# Patient Record
Sex: FEMALE | Race: BLACK OR AFRICAN AMERICAN | NOT HISPANIC OR LATINO | Employment: UNEMPLOYED | URBAN - METROPOLITAN AREA
[De-identification: names, ages, dates, MRNs, and addresses within clinical notes are randomized per-mention and may not be internally consistent; named-entity substitution may affect disease eponyms.]

---

## 2017-02-25 ENCOUNTER — HOSPITAL ENCOUNTER (EMERGENCY)
Facility: OTHER | Age: 42
Discharge: HOME | End: 2017-02-25
Attending: EMERGENCY MEDICINE
Payer: MEDICAID

## 2017-02-25 VITALS
HEIGHT: 64 IN | RESPIRATION RATE: 18 BRPM | TEMPERATURE: 98 F | OXYGEN SATURATION: 98 % | BODY MASS INDEX: 50.02 KG/M2 | WEIGHT: 293 LBS | SYSTOLIC BLOOD PRESSURE: 120 MMHG | DIASTOLIC BLOOD PRESSURE: 90 MMHG | HEART RATE: 73 BPM

## 2017-02-25 DIAGNOSIS — R10.9 ABDOMINAL PAIN: ICD-10-CM

## 2017-02-25 DIAGNOSIS — R10.84 ABDOMINAL PAIN, GENERALIZED: Primary | ICD-10-CM

## 2017-02-25 LAB
BILIRUB SERPL-MCNC: NEGATIVE MG/DL
BLOOD, POC UA: NEGATIVE
CLARITY, POC UA: CLEAR
COLOR, POC UA: YELLOW
GLUCOSE SERPL-MCNC: NEGATIVE MG/DL (ref 70–110)
LEUKOCYTE EST, POC UA: NEGATIVE
NITRITE, POC UA: NEGATIVE
PH SMN: 6.5 [PH]
POC KETONES, BLOOD: NEGATIVE
PROTEIN, POC: NEGATIVE
SPECIFIC GRAVITY, POC UA: 1.02
UROBILINOGEN, POC UA: 0.2 E.U./DL

## 2017-02-25 PROCEDURE — 81003 URINALYSIS AUTO W/O SCOPE: CPT

## 2017-02-25 PROCEDURE — 81025 URINE PREGNANCY TEST: CPT

## 2017-02-25 PROCEDURE — 96372 THER/PROPH/DIAG INJ SC/IM: CPT

## 2017-02-25 PROCEDURE — 99284 EMERGENCY DEPT VISIT MOD MDM: CPT | Mod: 25

## 2017-02-25 PROCEDURE — 63600175 PHARM REV CODE 636 W HCPCS: Performed by: EMERGENCY MEDICINE

## 2017-02-25 RX ORDER — DICYCLOMINE HYDROCHLORIDE 10 MG/ML
20 INJECTION INTRAMUSCULAR
Status: COMPLETED | OUTPATIENT
Start: 2017-02-25 | End: 2017-02-25

## 2017-02-25 RX ORDER — DICYCLOMINE HYDROCHLORIDE 20 MG/1
20 TABLET ORAL 2 TIMES DAILY
Qty: 20 TABLET | Refills: 0 | Status: SHIPPED | OUTPATIENT
Start: 2017-02-25 | End: 2017-03-27

## 2017-02-25 RX ADMIN — DICYCLOMINE HYDROCHLORIDE 20 MG: 20 INJECTION, SOLUTION INTRAMUSCULAR at 04:02

## 2017-02-25 NOTE — ED NOTES
Abdominal Pain: Patient complains of abdominal pain. The pain is described as colicky, cramping and stabbing, and is 8/10 in intensity. Pain is located in the generalized radiating down to the pelvic area with radiation to left groin and right groin. Onset was 2 days ago. Symptoms have been gradually worsening since. Aggravating factors: bowel movement and eating.  Alleviating factors: sitting up. Associated symptoms: constipation and nausea. The patient denies belching, diarrhea and fever.

## 2017-02-25 NOTE — ED AVS SNAPSHOT
[facility] EMERGENCY DEPARTMENT  [address] [place] [address]               Ashanti Thompson   2017  3:01 PM   ED    Description:  Female : 1975   Department:  [facility] Emergency Department           Your Care was Coordinated By:     Provider Role From To    Gemini Rincon MD Attending Provider [phone] --      Reason for Visit     Abdominal Pain           Diagnoses this Visit        Comments    Abdominal pain, generalized    -  Primary     Abdominal pain           ED Disposition     ED Disposition Condition Comment    Discharge             To Do List           Follow-up Information     Follow up with Galdino Angulo MD In 4 day(s).    Specialty:  Family Medicine    Contact information:    [address] [facility]  [place] [address]  [phone]         These Medications        Disp Refills Start End    dicyclomine (BENTYL) 20 mg tablet 20 tablet 0 2017 3/27/2017    Take 1 tablet (20 mg total) by mouth 2 (two) times daily. - Oral    [facility]arm[facility]: [facility] [address] [place] [address] EXPY AT [facility] Ph #: [phone]         [facility] On Call     [facility] On Call Nurse Care Line -  Assistance  Registered nurses in the [facility] On Call Center provide clinical advisement, health education, appointment booking, and other advisory services.  Call for this free service at [phone].             Medications           Message regarding Medications     Verify the changes and/or additions to your medication regime listed below are the same as discussed with your clinician today.  If any of these changes or additions are incorrect, please notify your healthcare provider.        START taking these NEW medications        Refills    dicyclomine (BENTYL) 20 mg tablet 0    Sig: Take 1 tablet (20 mg total) by mouth 2 (two) times daily.    Class: Normal    Route: Oral      These medications were administered today        Dose Freq     dicyclomine injection 20 mg 20 mg ED 1 Time    Sig: Inject 2 mLs (20 mg total) into the muscle ED 1 Time.    Class: Normal    Route: Intramuscular           Verify that the below list of medications is an accurate representation of the medications you are currently taking.  If none reported, the list may be blank. If incorrect, please contact your healthcare provider. Carry this list with you in case of emergency.           Current Medications     ACCU-CHEK BRIDGER PLUS METER [facility] [place]     blood-glucose meter (FREESTYLE SYSTEM KIT) kit Of patients choice, kervin brand    BOOSTRIX TDAP 2.5-8-5 Lf-mcg-Lf/0.5mL Susp     dicyclomine (BENTYL) 20 mg tablet Take 1 tablet (20 mg total) by mouth 2 (two) times daily.    FLUVIRIN [id number] 45 mcg (15 mcg x 3)/0.5 mL Susp     glyBURIDE (DIABETA) 5 MG tablet     ketoconazole (NIZORAL) 2 % cream Apply topically once daily.    LUTERA, 28, 0.1-20 mg-mcg per tablet     meloxicam (MOBIC) 15 MG tablet Po daily    metformin (GLUCOPHAGE) 500 MG tablet Take 1 tablet (500 mg total) by mouth daily with breakfast.    metformin (GLUCOPHAGE) 500 MG tablet Take 1 tablet (500 mg total) by mouth once daily.    pravastatin (PRAVACHOL) 40 MG tablet     TRUETEST TEST STRIPS Strp            Clinical Reference Information           Your Vitals Were     BP                   118/80 (BP Location: Left arm, Patient Position: Sitting)           Allergies as of 2/25/2017     No Known Allergies      Immunizations Administered on Date of Encounter - 2/25/2017     None      ED Micro, Lab, POCT     Start Ordered       Status Ordering Provider    02/25/17 1609 02/25/17 1609  POCT URINALYSIS W/O SCOPE  Once      Final result     02/25/17 1608 02/25/17 1608  POCT urine pregnancy  Once      Acknowledged     02/25/17 1608 02/25/17 1608  POCT URINALYSIS W/O SCOPE  Once      Completed       ED Imaging Orders     Start Ordered       Status Ordering Provider    02/25/17 1642 02/25/17 1642  X-Ray Abdomen Flat And Erect  1  time imaging      Final result         Discharge Instructions       Bentyl 20 mg one by mouth up to 4 times a day as needed for crampy abdominal pain.  Increase liquids by mouth for several days as well as fiber.  Consider adding a fiber supplement such as Metamucil or FiberCon to help soften stools.  Follow up with your primary care provider, Dr. Galdino Angulo, and for 5 days if not improving.    Abdominal Pain    Abdominal pain is pain in the stomach or belly area. Everyone has this pain from time to time. In many cases it goes away on its own. But abdominal pain can sometimes be due to a serious problem, such as appendicitis. So its important to know when to seek help.  Causes of abdominal pain  There are many possible causes of abdominal pain. Common causes in adults include:  · Constipation, diarrhea, or gas  · Stomach acid flowing back up into the esophagus (acid reflux or heartburn)  · Severe acid reflux, called GERD (gastroesophageal reflux disease)  · A sore in the lining of the stomach or small intestine (peptic ulcer)  · Inflammation of the gallbladder, liver, or pancreas  · Gallstones or kidney stones  · Appendicitis   · Intestinal blockage   · An internal organ pushing through a muscle or other tissue (hernia)  · Urinary tract infections  · In women, menstrual cramps, fibroids, or endometriosis  · Inflammation or infection of the intestines  Diagnosing the cause of abdominal pain  Your healthcare provider will do a physical exam help find the cause of your pain. If needed, tests will be ordered. Belly pain has many possible causes. So it can be hard to find the reason for your pain. Giving details about your pain can help. Tell your provider where and when you feel the pain, and what makes it better or worse. Also let your provider know if you have other symptoms such as:  · Fever  · Tiredness  · Upset stomach (nausea)  · Vomiting  · Changes in bathroom habits  Treating abdominal pain  Some causes of  pain need emergency medical treatment right away. These include appendicitis or a bowel blockage. Other problems can be treated with rest, fluids, or medicines. Your healthcare provider can give you specific instructions for treatment or self-care based on what is causing your pain.  If you have vomiting or diarrhea, sip water or other clear fluids. When you are ready to eat solid foods again, start with small amounts of easy-to-digest, low-fat foods. These include apple sauce, toast, or crackers.   When to seek medical care  Call 911 or go to the hospital right away if you:  · Cant pass stool and are vomiting  · Are vomiting blood or have bloody diarrhea or black, tarry diarrhea  · Have chest, neck, or shoulder pain  · Feel like you might pass out  · Have pain in your shoulder blades with nausea  · Have sudden, severe belly pain  · Have new, severe pain unlike any you have felt before  · Have a belly that is rigid, hard, and tender to touch  Call your healthcare provider if you have:  · Pain for more than 5 days  · Bloating for more than 2 days  · Diarrhea for more than 5 days  · A fever of 100.4°F (38.0°C) or higher, or as directed by your provider  · Pain that gets worse  · Weight loss for no reason  · Continued lack of appetite  · Blood in your stool  How to prevent abdominal pain  Here are some tips to help prevent abdominal pain:  · Eat smaller amounts of food at one time.  · Avoid greasy, fried, or other high-fat foods.  · Avoid foods that give you gas.  · Exercise regularly.  · Drink plenty of fluids.  To help prevent GERD symptoms:  · Quit smoking.  · Reduce alcohol and certain foods that increase stomach acid.  · Avoid aspirin and over-the-counter pain and fever medicines (NSAIDS or nonsteroidal anti-inflammatory drugs), if possible  · Lose extra weight.  · Finish eating at least 2 hours before you go to bed or lie down.  · Raise the head of your bed.  Date Last Reviewed: 7/1/2016  © 5590-6489 [employer]. [address] [place] [address]. All rights reserved. This information is not intended as a substitute for professional medical care. Always follow your healthcare professional's instructions.          [facility] Sign-Up     Activating your [facility] account is as easy as 1-2-3!     1) Visit [email or web address], select Sign Up Now, enter this activation code and your date of birth, then select Next.  [email or web address]  Expires: 4/11/2017  6:05 PM      2) Create a username and password to use when you visit [facility] in the future and select a security question in case you lose your password and select Next.    3) Enter your e-mail address and click Sign Up!    Additional Information  If you have questions, please e-mail [email or web address] or call [phone] to talk to our [facility] staff. Remember, [facility] is NOT to be used for urgent needs. For medical emergencies, dial 911.          [facility] Emergency Department complies with applicable Federal civil rights laws and does not discriminate on the basis of race, color, national origin, age, disability, or sex.        Language Assistance Services     ATTENTION: Language assistance services are available, free of charge. Please call [phone].      ATENCIÓN: Si habla español, tiene a agosto disposición servicios gratuitos de asistencia lingüística. Llame al [phone].     CHÚ Ý: N?u b?n nói Ti?ng Vi?t, có các d?ch v? h? tr? ngôn ng? mi?n phí dành cho b?n. G?i s? [phone].

## 2017-02-26 NOTE — DISCHARGE INSTRUCTIONS
Bentyl 20 mg one by mouth up to 4 times a day as needed for crampy abdominal pain.  Increase liquids by mouth for several days as well as fiber.  Consider adding a fiber supplement such as Metamucil or FiberCon to help soften stools.  Follow up with your primary care provider, Dr. Galdino Angulo, and for 5 days if not improving.    Abdominal Pain    Abdominal pain is pain in the stomach or belly area. Everyone has this pain from time to time. In many cases it goes away on its own. But abdominal pain can sometimes be due to a serious problem, such as appendicitis. So its important to know when to seek help.  Causes of abdominal pain  There are many possible causes of abdominal pain. Common causes in adults include:  · Constipation, diarrhea, or gas  · Stomach acid flowing back up into the esophagus (acid reflux or heartburn)  · Severe acid reflux, called GERD (gastroesophageal reflux disease)  · A sore in the lining of the stomach or small intestine (peptic ulcer)  · Inflammation of the gallbladder, liver, or pancreas  · Gallstones or kidney stones  · Appendicitis   · Intestinal blockage   · An internal organ pushing through a muscle or other tissue (hernia)  · Urinary tract infections  · In women, menstrual cramps, fibroids, or endometriosis  · Inflammation or infection of the intestines  Diagnosing the cause of abdominal pain  Your healthcare provider will do a physical exam help find the cause of your pain. If needed, tests will be ordered. Belly pain has many possible causes. So it can be hard to find the reason for your pain. Giving details about your pain can help. Tell your provider where and when you feel the pain, and what makes it better or worse. Also let your provider know if you have other symptoms such as:  · Fever  · Tiredness  · Upset stomach (nausea)  · Vomiting  · Changes in bathroom habits  Treating abdominal pain  Some causes of pain need emergency medical treatment right away. These include  appendicitis or a bowel blockage. Other problems can be treated with rest, fluids, or medicines. Your healthcare provider can give you specific instructions for treatment or self-care based on what is causing your pain.  If you have vomiting or diarrhea, sip water or other clear fluids. When you are ready to eat solid foods again, start with small amounts of easy-to-digest, low-fat foods. These include apple sauce, toast, or crackers.   When to seek medical care  Call 911 or go to the hospital right away if you:  · Cant pass stool and are vomiting  · Are vomiting blood or have bloody diarrhea or black, tarry diarrhea  · Have chest, neck, or shoulder pain  · Feel like you might pass out  · Have pain in your shoulder blades with nausea  · Have sudden, severe belly pain  · Have new, severe pain unlike any you have felt before  · Have a belly that is rigid, hard, and tender to touch  Call your healthcare provider if you have:  · Pain for more than 5 days  · Bloating for more than 2 days  · Diarrhea for more than 5 days  · A fever of 100.4°F (38.0°C) or higher, or as directed by your provider  · Pain that gets worse  · Weight loss for no reason  · Continued lack of appetite  · Blood in your stool  How to prevent abdominal pain  Here are some tips to help prevent abdominal pain:  · Eat smaller amounts of food at one time.  · Avoid greasy, fried, or other high-fat foods.  · Avoid foods that give you gas.  · Exercise regularly.  · Drink plenty of fluids.  To help prevent GERD symptoms:  · Quit smoking.  · Reduce alcohol and certain foods that increase stomach acid.  · Avoid aspirin and over-the-counter pain and fever medicines (NSAIDS or nonsteroidal anti-inflammatory drugs), if possible  · Lose extra weight.  · Finish eating at least 2 hours before you go to bed or lie down.  · Raise the head of your bed.  Date Last Reviewed: 7/1/2016  © 3453-2771 [employer]. [address] [place] [address], [place] [address]. All  rights reserved. This information is not intended as a substitute for professional medical care. Always follow your healthcare professional's instructions.

## 2017-02-26 NOTE — ED PROVIDER NOTES
Encounter Date: 2017       History     Chief Complaint   Patient presents with    Abdominal Pain     c/o of lower abdominal pain readiating down to her pelvic area since last night. Patient reports she had a bowel movement with no relief. Stool was hard then soft. took laxative still no relief. no nausea or vomiting     Review of patient's allergies indicates:  No Known Allergies  HPI Comments: 41-year-old female reports right sided lower abdominal and suprapubic pain radiating into her pelvis since last night.  She reports it improved last night after having a bowel movement which was small and hard.  She reports took a laxative today without much relief.  Patient also reports his crampy in nature.  Denies fever, nausea, and vomiting.    Patient is a 41 y.o. female presenting with the following complaint: abdominal pain. The history is provided by the patient.   Abdominal Pain   The current episode started yesterday. The onset of the illness was gradual. The abdominal pain is located in the RUQ, RLQ and suprapubic region. The severity of the abdominal pain is 7/10. The abdominal pain is relieved by nothing. The other symptoms of the illness do not include fever, shortness of breath, nausea or vomiting.   The patient states that she believes she is currently not pregnant. The patient has not had a change in bowel habit. Additional symptoms associated with the illness include constipation and back pain. Symptoms associated with the illness do not include chills or frequency.     Past Medical History:   Diagnosis Date    Diabetes mellitus     Hyperlipemia     Iron deficiency      Past Surgical History:   Procedure Laterality Date     SECTION       Family History   Problem Relation Age of Onset    Diabetes Father      Social History   Substance Use Topics    Smoking status: Never Smoker    Smokeless tobacco: None    Alcohol use No     Review of Systems   Constitutional: Negative for chills and  fever.   Respiratory: Negative for cough and shortness of breath.    Gastrointestinal: Positive for abdominal pain and constipation. Negative for blood in stool, nausea and vomiting.   Genitourinary: Negative for difficulty urinating and frequency.   Musculoskeletal: Positive for back pain. Negative for gait problem.       Physical Exam   Initial Vitals   BP Pulse Resp Temp SpO2   02/25/17 1506 02/25/17 1506 02/25/17 1506 02/25/17 1506 02/25/17 1506   118/80 81 20 98.1 °F (36.7 °C) 100 %     Physical Exam    Nursing note and vitals reviewed.  Constitutional: Vital signs are normal. She appears well-developed and well-nourished. She is cooperative.   HENT:   Head: Normocephalic and atraumatic.   Neck: Phonation normal. Neck supple.   Cardiovascular: Normal rate, regular rhythm and normal heart sounds.   Pulmonary/Chest: Effort normal and breath sounds normal.   Abdominal: Soft. Bowel sounds are normal. There is tenderness in the right upper quadrant. There is no rigidity, no rebound, no guarding, no tenderness at McBurney's point and negative Elias's sign.       Genitourinary: Rectal exam shows no tenderness and guaiac negative stool. Guaiac negative stool.   Musculoskeletal:        Cervical back: Normal.        Thoracic back: Normal.        Lumbar back: Normal.   Neurological: She is alert and oriented to person, place, and time. Gait normal.   Skin: Skin is warm and dry.         ED Course   Procedures  Labs Reviewed   POCT URINALYSIS W/O SCOPE   POCT URINALYSIS W/O SCOPE   POCT URINE PREGNANCY             Medical Decision Making:   Initial Assessment:   Crampy lower abdominal pain  Differential Diagnosis:   Constipation, urinary tract infection, menstrual cycle  Clinical Tests:   Lab Tests: Ordered and Reviewed  The following lab test(s) were unremarkable: Urinalysis       <> Summary of Lab: Urinalysis was normal  UPT negative    Flat and erect of the abdomin denies bowel gas pattern; no evidence of  obstruction.  Radiological Study: Ordered and Reviewed  ED Management:  Patient will be discharged with instructions to increase fiber in her diet as well as oral fluids for possible constipation.  Also recommend Bentyl 20 mg by mouth up to 4 times daily, as well as close follow-up with primary care.                   ED Course     Clinical Impression:   The primary encounter diagnosis was Abdominal pain, generalized. A diagnosis of Abdominal pain was also pertinent to this visit.    Disposition:   Disposition: Discharged  Condition: Stable       Gemini Rincon MD  02/25/17 1928

## 2018-02-24 ENCOUNTER — HOSPITAL ENCOUNTER (EMERGENCY)
Facility: OTHER | Age: 43
Discharge: HOME | End: 2018-02-24
Attending: EMERGENCY MEDICINE
Payer: MEDICAID

## 2018-02-24 VITALS
OXYGEN SATURATION: 100 % | BODY MASS INDEX: 50.02 KG/M2 | HEIGHT: 64 IN | HEART RATE: 73 BPM | SYSTOLIC BLOOD PRESSURE: 122 MMHG | TEMPERATURE: 98 F | RESPIRATION RATE: 19 BRPM | WEIGHT: 293 LBS | DIASTOLIC BLOOD PRESSURE: 69 MMHG

## 2018-02-24 DIAGNOSIS — J02.9 SORE THROAT: Primary | ICD-10-CM

## 2018-02-24 DIAGNOSIS — R68.89 FLU-LIKE SYMPTOMS: ICD-10-CM

## 2018-02-24 LAB
B-HCG UR QL: NEGATIVE
BILIRUBIN, POC UA: NEGATIVE
BLOOD, POC UA: NEGATIVE
CLARITY, POC UA: CLEAR
COLOR, POC UA: YELLOW
CTP QC/QA: YES
FLUAV AG NPH QL: NEGATIVE
FLUBV AG NPH QL: NEGATIVE
GLUCOSE SERPL-MCNC: 149 MG/DL (ref 70–110)
GLUCOSE, POC UA: NEGATIVE
KETONES, POC UA: NEGATIVE
LEUKOCYTE EST, POC UA: NEGATIVE
NITRITE, POC UA: NEGATIVE
PH UR STRIP: 5.5 [PH]
PROTEIN, POC UA: NEGATIVE
S PYO RRNA THROAT QL PROBE: NEGATIVE
SPECIFIC GRAVITY, POC UA: 1.02
UROBILINOGEN, POC UA: 0.2 E.U./DL

## 2018-02-24 PROCEDURE — 87804 INFLUENZA ASSAY W/OPTIC: CPT

## 2018-02-24 PROCEDURE — 87070 CULTURE OTHR SPECIMN AEROBIC: CPT

## 2018-02-24 PROCEDURE — 99283 EMERGENCY DEPT VISIT LOW MDM: CPT

## 2018-02-24 PROCEDURE — 81003 URINALYSIS AUTO W/O SCOPE: CPT

## 2018-02-24 PROCEDURE — 25000003 PHARM REV CODE 250: Performed by: EMERGENCY MEDICINE

## 2018-02-24 PROCEDURE — 87880 STREP A ASSAY W/OPTIC: CPT

## 2018-02-24 PROCEDURE — 81025 URINE PREGNANCY TEST: CPT | Performed by: EMERGENCY MEDICINE

## 2018-02-24 RX ORDER — IBUPROFEN 600 MG/1
600 TABLET ORAL
Status: COMPLETED | OUTPATIENT
Start: 2018-02-24 | End: 2018-02-24

## 2018-02-24 RX ORDER — OSELTAMIVIR PHOSPHATE 75 MG/1
75 CAPSULE ORAL 2 TIMES DAILY
Qty: 10 CAPSULE | Refills: 0 | Status: SHIPPED | OUTPATIENT
Start: 2018-02-24 | End: 2018-03-01

## 2018-02-24 RX ORDER — IBUPROFEN 600 MG/1
600 TABLET ORAL EVERY 8 HOURS PRN
Qty: 15 TABLET | Refills: 0 | Status: ON HOLD | OUTPATIENT
Start: 2018-02-24 | End: 2023-11-24 | Stop reason: HOSPADM

## 2018-02-24 RX ADMIN — IBUPROFEN 600 MG: 600 TABLET, FILM COATED ORAL at 08:02

## 2018-02-24 NOTE — ED PROVIDER NOTES
Encounter Date: 2018       History     Chief Complaint   Patient presents with    flu like symptoms     pt reports HA cough bodyaches and chills since last night     Ms. Thompson reports onset of body aches, sore throat, congestion, chills, headache, and mild nonspecific cough.  Reports all onset within the last 12 hours, fairly suddenly.  Reports she has been around the flu lately.  Reports biggest complaint is sore throat.  Reports she still able to perform her normal ADLs.  She is able to swallow, but it hurts.  She denies neck pain, photophobia, focal numbness, tingling or weakness.  She's not been sick recently.  Reports no medications taken prior to arrival.  Symptoms constant      The history is provided by the patient.     Review of patient's allergies indicates:  No Known Allergies  Past Medical History:   Diagnosis Date    Diabetes mellitus     Hyperlipemia     Iron deficiency      Past Surgical History:   Procedure Laterality Date     SECTION       Family History   Problem Relation Age of Onset    Diabetes Father      Social History   Substance Use Topics    Smoking status: Never Smoker    Smokeless tobacco: Not on file    Alcohol use No     Review of Systems   Constitutional: Positive for chills and fever.   HENT: Positive for congestion and sore throat. Negative for trouble swallowing and voice change.    Respiratory: Positive for cough. Negative for shortness of breath, wheezing and stridor.    Cardiovascular: Negative.    Gastrointestinal: Negative.    Musculoskeletal:        Body aches   Neurological: Positive for headaches.   All other systems reviewed and are negative.      Physical Exam     Initial Vitals [18 0721]   BP Pulse Resp Temp SpO2   (!) 168/104 76 19 98.1 °F (36.7 °C) 100 %      MAP       125.33         Physical Exam    Nursing note and vitals reviewed.  Constitutional: She appears well-developed and well-nourished. She is not diaphoretic. No distress.   HENT:    Head: Normocephalic and atraumatic.   Right Ear: External ear normal.   Left Ear: External ear normal.   Mouth/Throat: No oropharyngeal exudate.   Eyes: Conjunctivae and EOM are normal.   Neck: Normal range of motion. Neck supple.   Cardiovascular: Normal rate, regular rhythm and normal heart sounds.   No murmur heard.  Pulmonary/Chest: Breath sounds normal. No respiratory distress.   Abdominal: Soft. She exhibits no distension. There is no tenderness. There is no rebound.   Musculoskeletal: Normal range of motion. She exhibits no edema or tenderness.   Neurological: She is alert and oriented to person, place, and time. No cranial nerve deficit or sensory deficit.   Skin: Skin is warm and dry. Capillary refill takes less than 2 seconds. No rash noted. No erythema.   Psychiatric: She has a normal mood and affect. Her behavior is normal. Thought content normal.         ED Course   Procedures  Labs Reviewed   POCT GLUCOSE - Abnormal; Notable for the following:        Result Value    POC Glucose 149 (*)     All other components within normal limits   POCT URINALYSIS W/O SCOPE - Abnormal; Notable for the following:     Glucose, UA Negative (*)     Bilirubin, UA Negative (*)     Ketones, UA Negative (*)     Blood, UA Negative (*)     Protein, UA Negative (*)     Nitrite, UA Negative (*)     Leukocytes, UA Negative (*)     All other components within normal limits   CULTURE, RESPIRATORY  - THROAT   POCT URINE PREGNANCY   POCT URINALYSIS W/O SCOPE   POCT INFLUENZA A/B   POCT RAPID STREP A             Medical Decision Making:   ED Management:  Triage blood pressure was taken on forearm. Pt reports bp when checked at work on upper arm is 'always normal'. Rechecked on upper arm with correct cuff reveals sbp 120s-140s. She has rested during her time in the ER. We discussed possibility of flu and risks/benefits of tamilu and recommendations and she has politely declined rx. Will provide and she will think about taking.  Wants  to rest and hydrate. Throat culture sent, will f/u.  We discussed judicious care for her diabetes.  We discussed worrisome signs that should prompt need to return to the ER should they occur.  There is no indication for further emergent intervention or evaluation at this time.                      Clinical Impression:   The primary encounter diagnosis was Sore throat. A diagnosis of Flu-like symptoms was also pertinent to this visit.                           Shama Barber MD  03/12/18 1200

## 2018-02-26 LAB — BACTERIA THROAT CULT: NORMAL

## 2019-12-02 ENCOUNTER — HOSPITAL ENCOUNTER (EMERGENCY)
Facility: HOSPITAL | Age: 44
Discharge: HOME | End: 2019-12-02
Attending: EMERGENCY MEDICINE
Payer: MEDICAID

## 2019-12-02 VITALS
WEIGHT: 293 LBS | SYSTOLIC BLOOD PRESSURE: 151 MMHG | RESPIRATION RATE: 18 BRPM | OXYGEN SATURATION: 99 % | HEIGHT: 64 IN | HEART RATE: 89 BPM | BODY MASS INDEX: 50.02 KG/M2 | TEMPERATURE: 99 F | DIASTOLIC BLOOD PRESSURE: 80 MMHG

## 2019-12-02 DIAGNOSIS — B35.9 TINEA: ICD-10-CM

## 2019-12-02 DIAGNOSIS — R39.9 UTI SYMPTOMS: Primary | ICD-10-CM

## 2019-12-02 LAB
B-HCG UR QL: NEGATIVE
BILIRUBIN, POC UA: NEGATIVE
BLOOD, POC UA: NEGATIVE
CLARITY, POC UA: NORMAL
COLOR, POC UA: YELLOW
CTP QC/QA: YES
GLUCOSE, POC UA: NEGATIVE
KETONES, POC UA: NEGATIVE
LEUKOCYTE EST, POC UA: NEGATIVE
NITRITE, POC UA: NEGATIVE
PH UR STRIP: 6 [PH]
PROTEIN, POC UA: NEGATIVE
SPECIFIC GRAVITY, POC UA: 1.02
UROBILINOGEN, POC UA: 0.2 E.U./DL

## 2019-12-02 PROCEDURE — 81025 URINE PREGNANCY TEST: CPT | Mod: ER | Performed by: EMERGENCY MEDICINE

## 2019-12-02 PROCEDURE — 87086 URINE CULTURE/COLONY COUNT: CPT

## 2019-12-02 PROCEDURE — 87491 CHLMYD TRACH DNA AMP PROBE: CPT

## 2019-12-02 PROCEDURE — 81003 URINALYSIS AUTO W/O SCOPE: CPT | Mod: ER

## 2019-12-02 PROCEDURE — 99284 EMERGENCY DEPT VISIT MOD MDM: CPT | Mod: ER

## 2019-12-02 RX ORDER — CEPHALEXIN 500 MG/1
500 CAPSULE ORAL EVERY 12 HOURS
Qty: 20 CAPSULE | Refills: 0 | Status: SHIPPED | OUTPATIENT
Start: 2019-12-02 | End: 2019-12-12

## 2019-12-02 RX ORDER — PHENAZOPYRIDINE HYDROCHLORIDE 200 MG/1
200 TABLET, FILM COATED ORAL
Qty: 12 TABLET | Refills: 0 | Status: SHIPPED | OUTPATIENT
Start: 2019-12-02 | End: 2019-12-06

## 2019-12-02 RX ORDER — CLOTRIMAZOLE 1 %
CREAM (GRAM) TOPICAL
Qty: 15 G | Refills: 0 | Status: SHIPPED | OUTPATIENT
Start: 2019-12-02 | End: 2021-12-23 | Stop reason: SDUPTHER

## 2019-12-03 NOTE — ED PROVIDER NOTES
Encounter Date: 2019    SCRIBE #1 NOTE: I, Jeff Mccollum, am scribing for, and in the presence of,  LINDSEY Woodward. I have scribed the following portions of the note - Other sections scribed: HPI, ROS, PE.       History     Chief Complaint   Patient presents with    Abdominal Pain     PT C/O LOW ABD PAIN AND PRESSURE WHEN URINATING    Dysuria     44-year-old female with history of diabetes presents to the emergency department for 1 day history of atraumatic suprapubic abdominal pain associated with urinary frequency, urgency, and mild dysuria.  States her current symptoms are similar to her past episodes of UTIs, but more severe.  Denies other associated symptoms, including for fever, nausea, vomiting, back pain, pelvic pain, vaginal bleeding, vaginal discharge. Does note some mild constipation yesterday, but had a bowel movement today.  No medication taken prior to arrival for symptoms.  She is compliant with her diabetic medications.    The history is provided by the patient. No  was used.     Review of patient's allergies indicates:  No Known Allergies  Past Medical History:   Diagnosis Date    Diabetes mellitus     Hyperlipemia     Iron deficiency      Past Surgical History:   Procedure Laterality Date     SECTION       Family History   Problem Relation Age of Onset    Diabetes Father      Social History     Tobacco Use    Smoking status: Never Smoker    Smokeless tobacco: Never Used   Substance Use Topics    Alcohol use: No     Alcohol/week: 0.0 standard drinks    Drug use: No     Review of Systems   Constitutional: Negative for fever.   Respiratory: Negative for shortness of breath.    Cardiovascular: Negative for chest pain.   Gastrointestinal: Positive for abdominal pain. Negative for nausea and vomiting.   Genitourinary: Positive for dysuria, frequency and urgency. Negative for flank pain.   Musculoskeletal: Negative for back pain.   Skin: Negative for rash.    All other systems reviewed and are negative.      Physical Exam     Initial Vitals [12/02/19 2151]   BP Pulse Resp Temp SpO2   (!) 171/89 96 (!) 22 98.5 °F (36.9 °C) 98 %      MAP       --         Physical Exam    Nursing note and vitals reviewed.  Constitutional: She appears well-developed and well-nourished. She is not diaphoretic. She is Obese . No distress.   HENT:   Head: Normocephalic and atraumatic.   Right Ear: External ear normal.   Left Ear: External ear normal.   Nose: Nose normal.   Eyes: Conjunctivae and EOM are normal. Right eye exhibits no discharge. Left eye exhibits no discharge.   Neck: Normal range of motion. Neck supple. No tracheal deviation present. No JVD present.   Cardiovascular: Normal rate, regular rhythm and normal heart sounds. Exam reveals no friction rub.    No murmur heard.  Pulmonary/Chest: Breath sounds normal. No stridor. No respiratory distress. She has no wheezes. She has no rhonchi. She has no rales. She exhibits no tenderness.   Abdominal: Soft. She exhibits no distension. There is tenderness (mild suprapubic without lateralization). There is no rigidity, no rebound, no guarding, no CVA tenderness, no tenderness at McBurney's point and negative Elias's sign.   Musculoskeletal: Normal range of motion. She exhibits no edema or tenderness.   Neurological: She is alert and oriented to person, place, and time.   Skin: Skin is warm and dry. No rash and no abscess noted. No erythema. No pallor.   Nontender area of hyper pigmentation to skin folds of the pannus.  Minimal skin breakdown noted.  No purulent drainage.   Psychiatric: She has a normal mood and affect.         ED Course   Procedures  Labs Reviewed   CULTURE, URINE   C. TRACHOMATIS/N. GONORRHOEAE BY AMP DNA   POCT URINE PREGNANCY   POCT URINALYSIS W/O SCOPE   POCT URINALYSIS W/O SCOPE          Imaging Results    None          Medical Decision Making:   History:   Old Medical Records: I decided to obtain old medical  records.  Clinical Tests:   Lab Tests: Ordered and Reviewed  The following lab test(s) were unremarkable: UPT  ED Management:  Urinalysis unremarkable. However, given her symptoms, I will treat empirically while cultures are pending.  Incidental finding of tinea that I will also treat.  No pyelonephritis or urosepsis.  Does not endorse pelvic symptoms. I carefully considered but doubt appendicitis and obstruction.    Sent home with antibiotics and antifungal.  Advising follow-up with PCP.  Strict precautions discussed with patient is agreeable to the plan.    This is an emergent evaluation of a 44 y.o. female presenting to the ED for urinary symptoms. Afebrile. Patient is non-toxic appearing and in no acute distress. Presentation consistent with acute cystitis. No clinical evidence to suggest acute pyelonephritis at this time. No urosepsis. Lower suspicion for infected renal stone at this time. Does not endorse Hx or risk factors concerning for pelvic etiology at this time, including for PID and risk of TOA. I carefully consider, but doubt acute appendicitis.     Sent home with supportive care and antibiotics while culture is pending. Advising PCP follow up. Strict return precautions discussed. Agreeable to plan.     I discussed with the patient the diagnosis, treatment plan, indications for return to the emergency department, and for expected follow-up. The patient verbalized an understanding. The patient is asked if there are any questions or concerns. We discuss the case, until all issues are addressed to the patients satisfaction. Patient understands and is agreeable to the plan.           Scribe Attestation:   Scribe #1: I performed the above scribed service and the documentation accurately describes the services I performed. I attest to the accuracy of the note.    Scribe attestation: I, Demetri Nguyen, personally performed the services described in this documentation. All medical record entries made by the  surinder were at my direction and in my presence.  I have reviewed the chart and agree that the record reflects my personal performance and is accurate and complete                       Clinical Impression:     1. UTI symptoms    2. Tinea                                Demetri Nguyen PA-C  12/02/19 4486

## 2019-12-04 LAB
C TRACH DNA SPEC QL NAA+PROBE: NOT DETECTED
N GONORRHOEA DNA SPEC QL NAA+PROBE: NOT DETECTED

## 2019-12-05 LAB — BACTERIA UR CULT: NORMAL

## 2021-04-12 ENCOUNTER — PATIENT MESSAGE (OUTPATIENT)
Dept: RESEARCH | Facility: HOSPITAL | Age: 46
End: 2021-04-12

## 2021-12-18 ENCOUNTER — HOSPITAL ENCOUNTER (EMERGENCY)
Facility: HOSPITAL | Age: 46
Discharge: HOME | End: 2021-12-18
Attending: INTERNAL MEDICINE
Payer: MEDICAID

## 2021-12-18 VITALS
WEIGHT: 293 LBS | HEART RATE: 75 BPM | DIASTOLIC BLOOD PRESSURE: 95 MMHG | TEMPERATURE: 99 F | RESPIRATION RATE: 18 BRPM | BODY MASS INDEX: 50.02 KG/M2 | SYSTOLIC BLOOD PRESSURE: 143 MMHG | OXYGEN SATURATION: 100 % | HEIGHT: 64 IN

## 2021-12-18 DIAGNOSIS — R10.9 ABDOMINAL PAIN: ICD-10-CM

## 2021-12-18 DIAGNOSIS — R10.30 LOWER ABDOMINAL PAIN: ICD-10-CM

## 2021-12-18 LAB
ALBUMIN SERPL-MCNC: 3.3 G/DL (ref 3.3–5.5)
ALLENS TEST: ABNORMAL
ALP SERPL-CCNC: 89 U/L (ref 42–141)
B-HCG UR QL: NEGATIVE
BILIRUB SERPL-MCNC: 0.9 MG/DL (ref 0.2–1.6)
BILIRUBIN, POC UA: NEGATIVE
BLOOD, POC UA: NEGATIVE
BUN SERPL-MCNC: 10 MG/DL (ref 7–22)
CALCIUM SERPL-MCNC: 9.6 MG/DL (ref 8–10.3)
CHLORIDE SERPL-SCNC: 107 MMOL/L (ref 98–108)
CLARITY, POC UA: ABNORMAL
COLOR, POC UA: ABNORMAL
CREAT SERPL-MCNC: 0.4 MG/DL (ref 0.6–1.2)
CTP QC/QA: YES
GLUCOSE SERPL-MCNC: 189 MG/DL (ref 73–118)
GLUCOSE, POC UA: NEGATIVE
HCO3 UR-SCNC: 27.2 MMOL/L (ref 24–28)
KETONES, POC UA: NEGATIVE
LDH SERPL L TO P-CCNC: 1.98 MMOL/L (ref 0.5–2.2)
LEUKOCYTE EST, POC UA: NEGATIVE
NITRITE, POC UA: NEGATIVE
PCO2 BLDA: 48 MMHG (ref 35–45)
PH SMN: 7.36 [PH] (ref 7.35–7.45)
PH UR STRIP: 5 [PH]
PO2 BLDA: 33 MMHG (ref 40–60)
POC ALT (SGPT): 15 U/L (ref 10–47)
POC AST (SGOT): 18 U/L (ref 11–38)
POC BE: 1 MMOL/L
POC SATURATED O2: 61 % (ref 95–100)
POC TCO2: 26 MMOL/L (ref 18–33)
POC TCO2: 29 MMOL/L (ref 24–29)
POTASSIUM BLD-SCNC: 4 MMOL/L (ref 3.6–5.1)
PROTEIN, POC UA: NEGATIVE
PROTEIN, POC: 7.4 G/DL (ref 6.4–8.1)
SAMPLE: ABNORMAL
SITE: ABNORMAL
SODIUM BLD-SCNC: 145 MMOL/L (ref 128–145)
SPECIFIC GRAVITY, POC UA: >=1.03
UROBILINOGEN, POC UA: 0.2 E.U./DL

## 2021-12-18 PROCEDURE — 82803 BLOOD GASES ANY COMBINATION: CPT | Mod: ER

## 2021-12-18 PROCEDURE — 96374 THER/PROPH/DIAG INJ IV PUSH: CPT | Mod: ER

## 2021-12-18 PROCEDURE — 96361 HYDRATE IV INFUSION ADD-ON: CPT | Mod: ER

## 2021-12-18 PROCEDURE — 25000003 PHARM REV CODE 250: Mod: ER | Performed by: INTERNAL MEDICINE

## 2021-12-18 PROCEDURE — 96375 TX/PRO/DX INJ NEW DRUG ADDON: CPT | Mod: ER

## 2021-12-18 PROCEDURE — 81003 URINALYSIS AUTO W/O SCOPE: CPT | Mod: ER

## 2021-12-18 PROCEDURE — 81025 URINE PREGNANCY TEST: CPT | Mod: ER | Performed by: INTERNAL MEDICINE

## 2021-12-18 PROCEDURE — 85025 COMPLETE CBC W/AUTO DIFF WBC: CPT | Mod: ER

## 2021-12-18 PROCEDURE — 80053 COMPREHEN METABOLIC PANEL: CPT | Mod: ER

## 2021-12-18 PROCEDURE — 99284 EMERGENCY DEPT VISIT MOD MDM: CPT | Mod: 25,ER

## 2021-12-18 PROCEDURE — 63600175 PHARM REV CODE 636 W HCPCS: Mod: ER | Performed by: INTERNAL MEDICINE

## 2021-12-18 RX ORDER — ONDANSETRON 2 MG/ML
8 INJECTION INTRAMUSCULAR; INTRAVENOUS
Status: COMPLETED | OUTPATIENT
Start: 2021-12-18 | End: 2021-12-18

## 2021-12-18 RX ORDER — KETOROLAC TROMETHAMINE 30 MG/ML
30 INJECTION, SOLUTION INTRAMUSCULAR; INTRAVENOUS
Status: COMPLETED | OUTPATIENT
Start: 2021-12-18 | End: 2021-12-18

## 2021-12-18 RX ADMIN — ONDANSETRON 8 MG: 2 INJECTION INTRAMUSCULAR; INTRAVENOUS at 05:12

## 2021-12-18 RX ADMIN — KETOROLAC TROMETHAMINE 30 MG: 30 INJECTION, SOLUTION INTRAMUSCULAR at 05:12

## 2021-12-18 RX ADMIN — MAGNESIUM HYDROXIDE/ALUMINUM HYDROXICE/SIMETHICONE: 120; 1200; 1200 SUSPENSION ORAL at 05:12

## 2021-12-18 RX ADMIN — SODIUM CHLORIDE 1000 ML: 0.9 INJECTION, SOLUTION INTRAVENOUS at 04:12

## 2023-01-03 ENCOUNTER — HOSPITAL ENCOUNTER (EMERGENCY)
Facility: HOSPITAL | Age: 48
Discharge: HOME | End: 2023-01-03
Attending: EMERGENCY MEDICINE
Payer: MEDICAID

## 2023-01-03 VITALS
SYSTOLIC BLOOD PRESSURE: 136 MMHG | TEMPERATURE: 98 F | WEIGHT: 293 LBS | OXYGEN SATURATION: 97 % | HEART RATE: 81 BPM | HEIGHT: 64 IN | RESPIRATION RATE: 16 BRPM | DIASTOLIC BLOOD PRESSURE: 85 MMHG | BODY MASS INDEX: 50.02 KG/M2

## 2023-01-03 DIAGNOSIS — R07.9 CHEST PAIN: Primary | ICD-10-CM

## 2023-01-03 DIAGNOSIS — N30.00 ACUTE CYSTITIS WITHOUT HEMATURIA: ICD-10-CM

## 2023-01-03 LAB
ALBUMIN SERPL BCP-MCNC: 3.6 G/DL (ref 3.5–5.2)
ALP SERPL-CCNC: 113 U/L (ref 55–135)
ALT SERPL W/O P-5'-P-CCNC: 20 U/L (ref 10–44)
ANION GAP SERPL CALC-SCNC: 10 MMOL/L (ref 8–16)
AST SERPL-CCNC: 16 U/L (ref 10–40)
B-HCG UR QL: NEGATIVE
B-OH-BUTYR BLD STRIP-SCNC: 0.4 MMOL/L (ref 0–0.5)
BACTERIA #/AREA URNS HPF: ABNORMAL /HPF
BASOPHILS # BLD AUTO: 0.03 K/UL (ref 0–0.2)
BASOPHILS NFR BLD: 0.4 % (ref 0–1.9)
BILIRUB SERPL-MCNC: 0.6 MG/DL (ref 0.1–1)
BILIRUB UR QL STRIP: NEGATIVE
BNP SERPL-MCNC: <10 PG/ML (ref 0–99)
BUN SERPL-MCNC: 13 MG/DL (ref 6–20)
CALCIUM SERPL-MCNC: 9.7 MG/DL (ref 8.7–10.5)
CHLORIDE SERPL-SCNC: 103 MMOL/L (ref 95–110)
CLARITY UR: CLEAR
CO2 SERPL-SCNC: 25 MMOL/L (ref 23–29)
COLOR UR: YELLOW
CREAT SERPL-MCNC: 0.9 MG/DL (ref 0.5–1.4)
CTP QC/QA: YES
DIFFERENTIAL METHOD: ABNORMAL
EOSINOPHIL # BLD AUTO: 0.1 K/UL (ref 0–0.5)
EOSINOPHIL NFR BLD: 0.6 % (ref 0–8)
ERYTHROCYTE [DISTWIDTH] IN BLOOD BY AUTOMATED COUNT: 13.6 % (ref 11.5–14.5)
EST. GFR  (NO RACE VARIABLE): >60 ML/MIN/1.73 M^2
GLUCOSE SERPL-MCNC: 187 MG/DL (ref 70–110)
GLUCOSE UR QL STRIP: ABNORMAL
HCT VFR BLD AUTO: 41.8 % (ref 37–48.5)
HGB BLD-MCNC: 13.5 G/DL (ref 12–16)
HGB UR QL STRIP: NEGATIVE
HYALINE CASTS #/AREA URNS LPF: 0 /LPF
IMM GRANULOCYTES # BLD AUTO: 0.02 K/UL (ref 0–0.04)
IMM GRANULOCYTES NFR BLD AUTO: 0.2 % (ref 0–0.5)
KETONES UR QL STRIP: ABNORMAL
LEUKOCYTE ESTERASE UR QL STRIP: ABNORMAL
LYMPHOCYTES # BLD AUTO: 3 K/UL (ref 1–4.8)
LYMPHOCYTES NFR BLD: 36.5 % (ref 18–48)
MCH RBC QN AUTO: 26.2 PG (ref 27–31)
MCHC RBC AUTO-ENTMCNC: 32.3 G/DL (ref 32–36)
MCV RBC AUTO: 81 FL (ref 82–98)
MICROSCOPIC COMMENT: ABNORMAL
MONOCYTES # BLD AUTO: 0.4 K/UL (ref 0.3–1)
MONOCYTES NFR BLD: 4.4 % (ref 4–15)
NEUTROPHILS # BLD AUTO: 4.8 K/UL (ref 1.8–7.7)
NEUTROPHILS NFR BLD: 57.9 % (ref 38–73)
NITRITE UR QL STRIP: NEGATIVE
NRBC BLD-RTO: 0 /100 WBC
PH UR STRIP: 6 [PH] (ref 5–8)
PLATELET # BLD AUTO: 281 K/UL (ref 150–450)
PMV BLD AUTO: 10.9 FL (ref 9.2–12.9)
POCT GLUCOSE: 173 MG/DL (ref 70–110)
POTASSIUM SERPL-SCNC: 3.9 MMOL/L (ref 3.5–5.1)
PROT SERPL-MCNC: 7.8 G/DL (ref 6–8.4)
PROT UR QL STRIP: ABNORMAL
RBC # BLD AUTO: 5.15 M/UL (ref 4–5.4)
RBC #/AREA URNS HPF: 2 /HPF (ref 0–4)
SODIUM SERPL-SCNC: 138 MMOL/L (ref 136–145)
SP GR UR STRIP: >1.03 (ref 1–1.03)
SQUAMOUS #/AREA URNS HPF: 13 /HPF
TROPONIN I SERPL DL<=0.01 NG/ML-MCNC: <0.006 NG/ML (ref 0–0.03)
URN SPEC COLLECT METH UR: ABNORMAL
UROBILINOGEN UR STRIP-ACNC: NEGATIVE EU/DL
WBC # BLD AUTO: 8.22 K/UL (ref 3.9–12.7)
WBC #/AREA URNS HPF: 3 /HPF (ref 0–5)

## 2023-01-03 PROCEDURE — 82962 GLUCOSE BLOOD TEST: CPT

## 2023-01-03 PROCEDURE — 99285 EMERGENCY DEPT VISIT HI MDM: CPT | Mod: 25

## 2023-01-03 PROCEDURE — 25000003 PHARM REV CODE 250: Performed by: EMERGENCY MEDICINE

## 2023-01-03 PROCEDURE — 93005 ELECTROCARDIOGRAM TRACING: CPT

## 2023-01-03 PROCEDURE — 80053 COMPREHEN METABOLIC PANEL: CPT | Performed by: EMERGENCY MEDICINE

## 2023-01-03 PROCEDURE — 63600175 PHARM REV CODE 636 W HCPCS: Performed by: EMERGENCY MEDICINE

## 2023-01-03 PROCEDURE — 81000 URINALYSIS NONAUTO W/SCOPE: CPT

## 2023-01-03 PROCEDURE — 82010 KETONE BODYS QUAN: CPT | Performed by: EMERGENCY MEDICINE

## 2023-01-03 PROCEDURE — 84484 ASSAY OF TROPONIN QUANT: CPT | Performed by: EMERGENCY MEDICINE

## 2023-01-03 PROCEDURE — 81025 URINE PREGNANCY TEST: CPT

## 2023-01-03 PROCEDURE — 93010 ELECTROCARDIOGRAM REPORT: CPT | Mod: ,,, | Performed by: INTERNAL MEDICINE

## 2023-01-03 PROCEDURE — 83880 ASSAY OF NATRIURETIC PEPTIDE: CPT | Performed by: EMERGENCY MEDICINE

## 2023-01-03 PROCEDURE — 85025 COMPLETE CBC W/AUTO DIFF WBC: CPT | Performed by: EMERGENCY MEDICINE

## 2023-01-03 PROCEDURE — 96365 THER/PROPH/DIAG IV INF INIT: CPT

## 2023-01-03 PROCEDURE — 93010 EKG 12-LEAD: ICD-10-PCS | Mod: ,,, | Performed by: INTERNAL MEDICINE

## 2023-01-03 RX ORDER — METHOCARBAMOL 500 MG/1
500 TABLET, FILM COATED ORAL
Status: COMPLETED | OUTPATIENT
Start: 2023-01-03 | End: 2023-01-03

## 2023-01-03 RX ORDER — MAG HYDROX/ALUMINUM HYD/SIMETH 200-200-20
30 SUSPENSION, ORAL (FINAL DOSE FORM) ORAL ONCE
Status: COMPLETED | OUTPATIENT
Start: 2023-01-04 | End: 2023-01-03

## 2023-01-03 RX ORDER — ACETAMINOPHEN 325 MG/1
650 TABLET ORAL
Status: COMPLETED | OUTPATIENT
Start: 2023-01-03 | End: 2023-01-03

## 2023-01-03 RX ORDER — ASPIRIN 325 MG
325 TABLET ORAL
Status: COMPLETED | OUTPATIENT
Start: 2023-01-03 | End: 2023-01-03

## 2023-01-03 RX ORDER — LIDOCAINE HYDROCHLORIDE 20 MG/ML
15 SOLUTION OROPHARYNGEAL ONCE
Status: COMPLETED | OUTPATIENT
Start: 2023-01-04 | End: 2023-01-03

## 2023-01-03 RX ORDER — NITROFURANTOIN 25; 75 MG/1; MG/1
100 CAPSULE ORAL 2 TIMES DAILY
Qty: 10 CAPSULE | Refills: 0 | Status: SHIPPED | OUTPATIENT
Start: 2023-01-03 | End: 2023-01-08

## 2023-01-03 RX ADMIN — CEFTRIAXONE 1 G: 1 INJECTION, SOLUTION INTRAVENOUS at 10:01

## 2023-01-03 RX ADMIN — METHOCARBAMOL 500 MG: 500 TABLET ORAL at 11:01

## 2023-01-03 RX ADMIN — ASPIRIN 325 MG ORAL TABLET 325 MG: 325 PILL ORAL at 10:01

## 2023-01-03 RX ADMIN — ACETAMINOPHEN 650 MG: 325 TABLET ORAL at 11:01

## 2023-01-03 RX ADMIN — LIDOCAINE HYDROCHLORIDE 15 ML: 20 SOLUTION ORAL at 10:01

## 2023-01-03 RX ADMIN — ALUMINUM HYDROXIDE, MAGNESIUM HYDROXIDE, AND SIMETHICONE 30 ML: 200; 200; 20 SUSPENSION ORAL at 10:01

## 2023-01-04 PROCEDURE — 25000003 PHARM REV CODE 250: Performed by: EMERGENCY MEDICINE

## 2023-01-04 NOTE — DISCHARGE INSTRUCTIONS
Seek medical care if symptoms are worsening or any concerns.  Follow up with the primary care provider for further evaluation of chest pain within the next 1-2 weeks.

## 2023-01-04 NOTE — ED PROVIDER NOTES
"Encounter Date: 1/3/2023    SCRIBE #1 NOTE: I, Franco Lenz, am scribing for, and in the presence of,  Derrell Mixon MD. I have scribed the following portions of the note - Other sections scribed: HPI, ROS, PE.     History     Chief Complaint   Patient presents with    Chest Pain     Patient presents to ED secondary to left sided chest pain intermittent at 1000 but now constant. Described as pressure and radiates to back. Also reports abdominal pain beginning yesterday with no dysuria, n/v/d. Reports feeling "overheated" with chest pain. Patient also reports "sugar is running high." Reports home glucose being 235     Ashanti Thompson is a 47 y.o female with a PMHx of DM, and HLD, that comes to the ED complaining of constant mid-sternal chest pain beginning around 1100 today. Patient reports complaints of constant mid-sternal "pressure" chest pain since 1100 today, endorsing her pain radiates to her middle upper back and has progressively exacerbated since onset. Patient further reports episodes of "feeling overheated" endorsing associated shortness of breath during episodes, but notes no shortness of breath currently. Patient additionally reports some lower abdominal pain that starts on the left and travels to the right for 2 days. Tylenol attempted for treatment with no immediate relief noted. No other medications taken PTA. Patient denies compliance with insulin, contraceptives, or hormone therapy. Denies nausea, vomiting, rhinorrhea, sore throat, fever, cough, urinary frequency, dysuria, dizziness, or other associated symptoms. Patient denies EtOH or tobacco usage. Denies any past blood clot history, recent surgeries, or cardiac family history. No known allergies.    The history is provided by the patient. No  was used.   Review of patient's allergies indicates:  No Known Allergies  Past Medical History:   Diagnosis Date    Diabetes mellitus     Hyperlipemia     Iron deficiency      Past " Surgical History:   Procedure Laterality Date     SECTION       Family History   Problem Relation Age of Onset    Diabetes Father      Social History     Tobacco Use    Smoking status: Never    Smokeless tobacco: Never   Substance Use Topics    Alcohol use: No     Alcohol/week: 0.0 standard drinks    Drug use: No     Review of Systems   Constitutional:  Negative for chills and fever.   HENT:  Negative for rhinorrhea and sore throat.    Respiratory:  Positive for shortness of breath. Negative for cough.    Cardiovascular:  Positive for chest pain (mid-sternal).   Gastrointestinal:  Positive for abdominal pain (lower, left to right). Negative for nausea and vomiting.   Genitourinary:  Negative for dysuria and frequency.   Musculoskeletal:  Positive for back pain (mid upper).   Skin:  Negative for rash.   Neurological:  Negative for dizziness and weakness.   Psychiatric/Behavioral:  Negative for confusion.      Physical Exam     Initial Vitals [23]   BP Pulse Resp Temp SpO2   131/80 95 20 98.5 °F (36.9 °C) 98 %      MAP       --         Physical Exam    Nursing note and vitals reviewed.  Constitutional: She appears well-developed and well-nourished. She does not appear ill. No distress.   HENT:   Head: Normocephalic and atraumatic.   Mouth/Throat: Oropharynx is clear and moist.   Eyes: Conjunctivae and EOM are normal.   Neck:   Normal range of motion.  Cardiovascular:  Normal rate, regular rhythm and normal heart sounds.           No murmur heard.  Pulses:       Dorsalis pedis pulses are 2+ on the right side and 2+ on the left side.   Pulmonary/Chest: Breath sounds normal. No respiratory distress. She has no wheezes.   Abdominal: Abdomen is soft. There is no abdominal tenderness.   Musculoskeletal:         General: No edema.      Cervical back: Normal range of motion.      Right lower leg: No edema.      Left lower leg: No edema.      Comments: No calf tenderness noted.     Neurological: She is  alert. GCS score is 15.   Skin: Skin is warm.   Psychiatric: She has a normal mood and affect.       ED Course   Procedures  Labs Reviewed   URINALYSIS, REFLEX TO URINE CULTURE - Abnormal; Notable for the following components:       Result Value    Specific Gravity, UA >1.030 (*)     Protein, UA 1+ (*)     Glucose, UA Trace (*)     Ketones, UA 2+ (*)     Leukocytes, UA 3+ (*)     All other components within normal limits    Narrative:     Specimen Source->Urine   URINALYSIS MICROSCOPIC - Abnormal; Notable for the following components:    Bacteria Few (*)     All other components within normal limits    Narrative:     Specimen Source->Urine   CBC W/ AUTO DIFFERENTIAL - Abnormal; Notable for the following components:    MCV 81 (*)     MCH 26.2 (*)     All other components within normal limits   COMPREHENSIVE METABOLIC PANEL - Abnormal; Notable for the following components:    Glucose 187 (*)     All other components within normal limits   POCT GLUCOSE - Abnormal; Notable for the following components:    POCT Glucose 173 (*)     All other components within normal limits   TROPONIN I   B-TYPE NATRIURETIC PEPTIDE   BETA - HYDROXYBUTYRATE, SERUM   POCT URINE PREGNANCY          Imaging Results              X-Ray Chest 1 View (Final result)  Result time 01/03/23 20:05:24      Final result by Ayaz Moss MD (01/03/23 20:05:24)                   Impression:      No acute process.      Electronically signed by: Ayaz Moss MD  Date:    01/03/2023  Time:    20:05               Narrative:    EXAMINATION:  XR CHEST 1 VIEW    CLINICAL HISTORY:  Chest pain, unspecified    TECHNIQUE:  Single frontal view of the chest was performed.    COMPARISON:  None    FINDINGS:  The trachea is unremarkable.  The cardiomediastinal silhouette is within normal limits.  The hilar structures are unremarkable.  There is no evidence of free air beneath the hemidiaphragms.  There are no pleural effusions.  There is no evidence of a pneumothorax.   There is no evidence of pneumomediastinum.  No airspace opacity is present.  The osseous structures are unremarkable.                                       Medications   aspirin tablet 325 mg (325 mg Oral Given 1/3/23 2228)   cefTRIAXone (ROCEPHIN) 1 g/50 mL D5W IVPB (0 g Intravenous Stopped 1/3/23 2317)   aluminum-magnesium hydroxide-simethicone 200-200-20 mg/5 mL suspension 30 mL (30 mLs Oral Given 1/3/23 2259)     And   LIDOcaine HCl 2% oral solution 15 mL (15 mLs Oral Given 1/3/23 2259)   methocarbamoL tablet 500 mg (500 mg Oral Given 1/3/23 2300)   acetaminophen tablet 650 mg (650 mg Oral Given 1/3/23 2300)     Medical Decision Making:   History:   Old Medical Records: I decided to obtain old medical records.  Initial Assessment:       47-year-old female presenting with chest pain.  Patient reporting retrosternal chest pain constant for greater than 10 hours.  No family history of cardiac disease reported.  No ST changes on ECG.  No infectious respiratory symptoms reported such as cough rhinorrhea or fever.  Clear lung sounds on exam.  Lower suspicion for ACS.    Patient also reporting lower abdominal pain.  Patient has bacteria and leukocytes on UA.  Patient treated for possible UTI.  No guarding rebound or rigidity.  No tenderness on exam.  Differential Diagnosis:   ACS versus respiratory infection versus musculoskeletal pain versus gastritis  Independently Interpreted Test(s):   I have ordered and independently interpreted EKG Reading(s) - see summary below  Clinical Tests:   Lab Tests: Ordered and Reviewed  Radiological Study: Ordered and Reviewed  Medical Tests: Ordered and Reviewed  Additional MDM:   Heart Score:    History:          Slightly suspicious.  ECG:             Normal  Age:               Less than 45 years  Risk factors: no risk factors known  Troponin:       Less than or equal to normal limit  Final Score: 0       Scribe Attestation:   Scribe #1: I performed the above scribed service and the  documentation accurately describes the services I performed. I attest to the accuracy of the note.      ED Course as of 01/04/23 0427   e Jan 03, 2023   2247 EKG 12-lead  Time 7:23 p.m.     Rate 96, sinus, regular rhythm, left axis deviation.   QRS 80 QTC of 454.  No ST elevation or depression.  No T-wave inversion no Q-waves present     Normal sinus rhythm. [JM]   8201 The patient reports chest pain has resolved this time.  Discussed lab results imaging and plan with patient for follow up with primary care. [JM]      ED Course User Index  [JM] Derrell Mixon MD                 Clinical Impression:   Final diagnoses:  [R07.9] Chest pain (Primary)  [N30.00] Acute cystitis without hematuria        ED Disposition Condition    Discharge Stable          ED Prescriptions       Medication Sig Dispense Start Date End Date Auth. Provider    nitrofurantoin, macrocrystal-monohydrate, (MACROBID) 100 MG capsule Take 1 capsule (100 mg total) by mouth 2 (two) times daily. for 5 days 10 capsule 1/3/2023 1/8/2023 Derrell Mixon MD          Follow-up Information       Follow up With Specialties Details Why Contact Info    Galdino Angulo MD Family Medicine In 2 days Primary care [address] [facility] [address] [phone]      [facility] - Emergency Dept Emergency Medicine  If symptoms worsen [address]  [facility] [address] [phone]            I, Derrell Mixon, personally performed the services described in this documentation. All medical record entries made by the scribe were at my direction and in my presence. I have reviewed the chart and agree that the record reflects my personal performance and is accurate and complete.         Derrell Mixon MD  01/04/23 0427

## 2023-01-04 NOTE — ED NOTES
Assessment/Plan:    Kendall Edouard was seen today for initial developmental assessment  Diagnoses and all orders for this visit:    Development delay- global  -     Ambulatory referral to Audiology; Future  -     Ambulatory referral to Speech Therapy; Future  -     Ambulatory referral to Physical Therapy; Future    Wears glasses    Failed hearing screening at audiology  -     Ambulatory referral to Audiology; Future  -     Ambulatory referral to Speech Therapy; Future    Aphakic open-angle glaucoma, bilateral, moderate stage  -     Ambulatory referral to Physical Therapy; Future    Mixed receptive-expressive language disorder  -     Ambulatory referral to Audiology; Future  -     Ambulatory referral to Speech Therapy; Future        Chema Cordero is a 1  y o  9  m o  male here for initial developmental assessment  Chema Cordero has been seen by Alicia EMERY at [address]  These are the results and goals from today's visit:  1 ) Based on information provided by you and your child's therapists and/or teachers and observations and/or testing in clinic today, your child's symptoms fit best with a diagnosis of developmental delays in cognitive, receptive and expressive language, adaptive skills, fine motor skills and he has vision difficulties that impact his gross motor skills  His speech delays cause frustration and behaviors  The goal is that with improvement in his his skills through therapy there will be less behaviors  Basado en la informacion recivida hoy departe sulla y de sarah terapistas y o [place], observaciones or examenes, los simptomas  Florian diagnosis es desarollo tarde cognitiva, lenguaje receptivo y expresivo, y tiene dificulta de vision que puede impactar florian aprendisaje      2 ) Based on these areas of concern, we are providing you with additional information and resources for you and your family to review    This information can be used by Bed: 07main  Expected date:   Expected time:   Means of arrival: Personal Transportation  Comments:   early intervention, therapists, and/or teachers at school to start services  Basado en esto le estamos dando informacion y terapias para ti y tu moon  Estos referidos puede ser comunicado a Intervencion temprano, terapia and [place] para que empiezen servicios  You were given a letter to get Yanci Lindsey evaluated for services through the intermediate unit 20  Please fill out the bottom of the letter and submitted to the address circled on the attached paper  Le dimos un carta para que le de a la escuela por favor de llenar samayoa parte y mandarlo por correo a la direccion indicada  Therapy: We gave a referral for [facility] for fine motor skills and adaptive skills and speech  Le dimos un referido par [place] ocupacional y  terapia del hablar  We gave you a list on other places to get therapy  Tambien le dimos un a lista para otro lugares para la terapia  Please call our office if you need new scripts or have any questions about  services  Por favor de llamar nuestra oficina si necesita nueva recetas or algunas preguntas sobre sarah servicios  We will see you back in two months to review if you are having any difficulty with getting services and appointments for audiology, speech therapy, physical therapy and getting started with the intermediate unit  Rickmireille Tapiat que regrese en dos meses para hablar que servicios empezaron y si tiene dificulta haciendo sarah citas para terapia del hablar, terapia fisica y el examen de los oidos  Additional:    Genetics: We discussed that he would benefit from getting a genetics evaluation  We will review this further at his next appointment  Neuro imaging:  Due to his visual differences potential hearing loss and larger head size I would recommend getting an MRI to rule out structural abnormalities of the brain       M*Modal software was used to dictate this note  It may contain errors with dictating incorrect words/spelling   Please contact provider directly for any questions  I personally spent over half of a total of 90 minutes face to face with the patient/family discussing diagnosis, treatment and interventions  CHIEF COMPLAINT: There are concerns for causes for speech delay and behaviors including hitting his head, hitting and kicking others  HPI:    Shelton Pichardo is a 1  y o  9  m o  male here for initial developmental assessment  There are concerns from the  Audiology about Christopher's developmental progress  Chandu Mcintyre sees FROILAN Bach for primary care  The history today is reported by mother  Chandu Mcintyre was born at [facility]  He was pre-term at  27 weeks gestation to a 35year old female by  Spontaneous vaginal delivery   Birth Weight:  8lb 1 oz  Mother reports  Had gestational diabetes  No  hypertension, pre-eclampsia, bed rest, pre-term labor  There are no reported medication, illegal substance, alcohol and nicotine use during pregnancy  Mother reports use of medication  Metformin  There were no complications  He has otherwise been a healthy child, with no recurrent emergency room visits or hospitalizations  Developmental History (age patient completed these milestones): Sat without support: 18 months  Walk without holding on: 18 months  First word besides mama, ronni:  About 5 month ago  2-3 word phrase:  Not yet  Toilet trained:  just started sitting  Regression: after surgery     The initial concern for his development was at 13 months old due to speech delay  There is concern that Chandu Mcintyre has speech delay and self injury  He is behind in all areas of development  He does not recognize color shapes numbers and does not seem to retain information  Glass Zachary He is supposed to get continual screening from Ophthalmology at [facility] for his glaucoma and eyeglasses  He had a hearing evaluation with concerns for cochlear impairment    He has not followed up with Ophthalmology or audiology  At his last audiology evaluation, there was concern that he only received two months of speech therapy before Early intervention stopped going to the house  He was seen by audiology in February of 2018 and should not have transition to the intermediate unit until April of 2018  Family states he has not been evaluated by the intermediate unit  Behaviors:  His family states that there are tantrums: He has about four five a day that last for about 20 min  They do not use behavior interventions at home  They have the TV on constantly at home but he does not watch  There is a TV in the bedroom       Family states that he does not put non food items in his mouth, wander and the house is child proofed  There is no exposure to cigarettes or guns in the house and no exposure to yelling or physical violence  Sleeping Habits:  Max Gamboa is able to sleep throughout the night  He sleeps in crib  There are concerns for frequently waking up, able to fall back to sleep on their own and snoring  Eating Habits:  Currently, Christopher drinks from a sippy cup and eats off a fork or spoon and using a fork or spoon independently  He drinks water and milk  He eats a variety of foods and no concerns    Extracurricular activities:  None     Besides his PCP, Max Gamboa has not been evaluated by another provider for these concerns  Max Gamboa is followed by Ophthalmology  His mother states he got new eyeglasses last week  [address] [facility] Ophthalmology: 3/15/18:Bilateral Aphakic Glaucoma s/p Ahmed glaucoma tube shunt right eye 7/11/17 ; f/u 6 months     Audiology 02/23/2018:" patient has been evaluated for speech and language  She indicated that he has received speech therapy for approximately 2 months and then the therapist stopped coming to the house  Waiting for speech therapy to start again   Patient has vision issues and wears glasses, patient did not have glasses for today's appointment  "  "Distortion Product Otoacoustic Emissions:  Right: Absent Consistent with abnormal cochlear function with hearing loss equal to or greater than a moderate hearing loss  Left: Absent Consistent with abnormal cochlear function with hearing loss equal to or greater than a moderate hearing loss"      Academic Services and Skills:  Lives in [facility]  Additional services: WIC, SSDI,     Baby Cushing also spends time with maternal grandmother   provider: none    Baby Cushing currently does not  or   He is not receiving 15 year old services through the /IEP through the school district  ( [facility])    Baby Cushing has no services       Frequency of interventions: none    Outpatient:  none    Baby Cushing is not receiving other services of outside therapy   Family reports:    Cognitive Skills:   His cognitive skills are delayed with slow improvement  Baby Cushing is able to  look for  hidden item, stack 5 to 6 blocks and place shapes in a shape sorter  His mother states that he does not play hiding go seek, does not look for hidden items unless it is something she puts away, does not seek to understand body parts or respond to his name or who other people are  She says he does not understand color shapes or numbers and does not match things  Language Skills:  His receptive language skills delayed with slow improvement  Baby Cushing is able to respond to sounds, look towards voices and Follows basic directions for daily activities such as snack, bedtime, bath time and outside  He response to most instructions in Hebrew  His expressive language skills are delayed with slow improvement  Baby Cushing is able to laugh, jargon At himself and sometimes at others, cries when upset and point using Mature point  Points to what he wants and will hit his head if he is upset or frustrated      Social Skills:   His social skills are delayed with slow improvement  Lesley Guaman is able to goes to parent when hurt  he likes to look at books,  He follows his has older sister: follows sister  He pretends what she is pretending  She states that he does not bring things like books to other people, does not seem to play with toys appropriately and will throw things when he is upset  He likes to move cars around  He holds objects close to his eyes  does not look for joint attention  He cries and goes to  a parent  He got his glasses last week  he kicks and hit head and do not know why  ROS:   History obtained from mother  General ROS: positive for  - growing well negative for - fatigue or fever   Ophthalmic ROS: negative for - dry eyes, excessive tearing or vision difficulties, does not run into things or have difficulty picking things up in front of him                                                                           Dental: they try to brush his teeth,  ENT ROS:  negative for - nasal congestion, sore throat, ear pain, vocal changes  Hematological and Lymphatic ROS: negative for - anemia, bleeding problems or bruising  Respiratory ROS: no cough, shortness of breath, or wheezing   Cardiovascular ROS: negative for - dyspnea on exertion, irregular heartbeat, murmur, palpitations, rapid heart rate or cyanosis, no known congenital heart defect   Gastrointestinal ROS: negative for - abdominal pain, change in stools, nausea/vomiting or   Musculoskeletal ROS: negative for - gait disturbance, joint pain, joint stiffness, joint swelling, muscle pain or muscular weakness  Neurological ROS: ,  negative for - gait disturbance, headaches, seizures, tremors or tics   Dermatological ROS: negative for rash and Changes in skin pigmentati  Pain: none today     No Known Allergies    Patient has no known allergies        Current Outpatient Prescriptions:     albuterol (2 5 mg/3 mL) 0 083 % nebulizer solution, 1 vial(s) by Nebulizer route every 4 hours as needed  , Disp: , Rfl:     albuterol (ACCUNEB) 0 63 MG/3ML nebulizer solution, Inhale 1 ampule every 6 (six) hours as needed, Disp: , Rfl:     dorzolamide-timolol (COSOPT) 22 3-6 8 MG/ML ophthalmic solution, PLACE ONE DROP(S) IN EYE 2 TIMES DAILY  , Disp: , Rfl: 4    atropine (ISOPTO ATROPINE) 1 % ophthalmic solution, Apply to eye, Disp: , Rfl:       Past Medical History:   Diagnosis Date    Absence of lens 2015    Adhesion of iris 2015    Overview:  Overview:  right    Aphakic open-angle glaucoma, bilateral, moderate stage     as per mom child has glaucoma: Dr Vinita Delaney  Overview:  Added automatically from request for surgery [id number]    Congenital cataract 2015    Annotation - [id number]: referred to [facility] for adrian  congenital cataracts for surgery ; Description: sees [facility] Op[facility], last visit 9/11/15    Glaucoma     [facility]:Bilateral Aphakic Glaucoma s/p Ahmed glaucoma tube shunt right eye 7/11/17     Microcornea 2015    Microphthalmia, bilateral 2015    Wears glasses        Denies history of:  Seizures or traumatic brain injury    History reviewed  No pertinent surgical history  Family History   Problem Relation Age of Onset    Diabetes Mother     No Known Problems Father        Denies family history of genetic syndrome, congenital malformation and thyroid problems  Social History     Social History    Marital status: Single     Spouse name: N/A    Number of children: N/A    Years of education: N/A     Occupational History    Not on file  Social History Main Topics    Smoking status: Never Smoker    Smokeless tobacco: Never Used    Alcohol use Not on file    Drug use: Unknown    Sexual activity: Not on file     Other Topics Concern    Not on file     Social History Narrative    No handguns in the home  Lives home with mom , sisters Gaston Harden and Marge          Additional Social History:  Living Conditions    Lives with Mom     Other individuals living in the home [address] [facility], and [place] Mother's name Wanda Rivers        Physical Exam:    Vitals:    11/15/18 1021   BP: (!) 118/64   BP Location: Left arm   Patient Position: Sitting   Cuff Size: Child   Pulse: 112   Resp: 22   Weight: 21 kg (46 lb 3 2 oz)   Height: 3' 5 42" (1 052 m)   HC: 54 cm (21 26")       Head Circumference 98%)    Dysmorphic features: occipital region larger parietal region    General:  overall healthy and well nourished,   HEENT atraumatic, palate intact, no nasal discharge, PERRLA and Difficulty tracking, eyeglasses in place, thick lenses, strabismus, looks at things up close,  Cardiovascular:  RRR and no murmurs, rubs, gallops,  Lungs:  CTA and good aeration to the bases bilaterally,   Gastrointestinal:  soft, NT/ND and good BS ,  Genitourinary:  normal male genitalia ,   Skin:  No rash, hypopigmented area on right buttocks less than 1 cm,   Musculoskeletal:  FROM, joint laxity/w-sits, 4/4 strength upper extremities, 4/4 strength lower extremities and Increased or internal rotation at hips   Neurologic:  tics None, tremor None, tone Within normal limits for age, gait Heel-toe and reflexes 2/4 upper and lower extremities bilateral and symmetric    Developmental Assessments:     [facility] questionnaire: areas of concern 3/14, severity score 7/126   Parent: inattention 0 /9, hyperactivity  0 /9, oppositional: 0, Anxiety:0  academics:  No answer, social interactions:  No answer, organizational skills:  No answer  Observations in clinic:  Merline Butters was able to:    Bell:  pauses and turns to the sound the bell both right and left side  , turns to the sound of fingers napping both sides  Did not respond to words consistently  He did respond to some single words such as sit in [place] and [address]  Blocks:  stack blocks vertical and with visual prompts able to follow horizontal but not a train    Form board:  He was able to place the triangle in the form board more easily than the [place] and [place]  He needed prompting to fix the [place] and [place]  Peg board:   pulls pegs out of peg board  Had a hard time placing them bilateral hands  Toys:   he looked at the toys up close using a left eye dominance  He was able to imitate rolling trucks around, having animals dance and pretended to use a spoon and bowl to mix on his own  Writing utensil:  full hand grasp scribbles  Social smile:  he smiled on his own or padded  When upset he would look toward his mother and hit his head  Eye contact:   eye contact was inconsistent he did not seem to be able to visually follow hand gestures and had difficulty reaching out and grabbing for people or items  Tolerated hand over hand for more and help  He seem to understand if he was shown how to do something but at a very short distance  Such as opening a clip on the examiner's badge  Gestures:   He was only seen to point  He enjoyed popping bubbles but then moved on to the next item  He liked to look at books up close  Joint interaction:   uses immature point to point to things at a distance  Besides ut-oh and whoa, no words were heard today spontaneously  He did imitate the word done  He often would feel items around him to navigate his environment  He was cautious when trying to get on a chair  He had a look closely at the chair before sitting down  He was able to climb onto the child size exam table

## 2023-01-04 NOTE — ED TRIAGE NOTES
"Pt presents to ED with chest pain today and abdominal pain x2 days. Pt states left-sided CP radiating to back accompanied with lower abdominal pain. Pt reports nausea, HA, and feeling "overheated denies urinary s/sx, fever, or chills. PMHx DM. Pt states high CBG PTA and did take metformin today. AAOx4, NAD, VSS.   "

## 2023-11-18 ENCOUNTER — HOSPITAL ENCOUNTER (INPATIENT)
Facility: HOSPITAL | Age: 48
LOS: 6 days | Discharge: HOME HEALTH | DRG: 871 | End: 2023-11-24
Attending: EMERGENCY MEDICINE | Admitting: INTERNAL MEDICINE
Payer: MEDICAID

## 2023-11-18 DIAGNOSIS — E11.65 TYPE 2 DIABETES MELLITUS WITH HYPERGLYCEMIA, WITHOUT LONG-TERM CURRENT USE OF INSULIN: ICD-10-CM

## 2023-11-18 DIAGNOSIS — R07.9 CHEST PAIN: ICD-10-CM

## 2023-11-18 DIAGNOSIS — L03.314 CELLULITIS OF GROIN, RIGHT: ICD-10-CM

## 2023-11-18 DIAGNOSIS — L02.214 ABSCESS OF RIGHT GROIN: ICD-10-CM

## 2023-11-18 DIAGNOSIS — A41.9 SEPSIS: ICD-10-CM

## 2023-11-18 DIAGNOSIS — L03.317 CELLULITIS OF BUTTOCK: Primary | ICD-10-CM

## 2023-11-18 DIAGNOSIS — L03.90 CELLULITIS: ICD-10-CM

## 2023-11-18 DIAGNOSIS — N76.82 FOURNIER'S GANGRENE IN FEMALE: ICD-10-CM

## 2023-11-18 LAB
ALBUMIN SERPL-MCNC: 2.7 G/DL (ref 3.3–5.5)
ALLENS TEST: ABNORMAL
ALLENS TEST: ABNORMAL
ALP SERPL-CCNC: 153 U/L (ref 42–141)
BILIRUB SERPL-MCNC: 1.8 MG/DL (ref 0.2–1.6)
BUN SERPL-MCNC: 9 MG/DL (ref 7–22)
CALCIUM SERPL-MCNC: 8.9 MG/DL (ref 8–10.3)
CHLORIDE SERPL-SCNC: 104 MMOL/L (ref 98–108)
CREAT SERPL-MCNC: 0.5 MG/DL (ref 0.6–1.2)
GLUCOSE SERPL-MCNC: 328 MG/DL (ref 73–118)
HCO3 UR-SCNC: 21.6 MMOL/L (ref 24–28)
HCO3 UR-SCNC: 22.4 MMOL/L (ref 24–28)
HCT, POC: NORMAL
HGB, POC: NORMAL (ref 14–18)
LDH SERPL L TO P-CCNC: 1.46 MMOL/L (ref 0.5–2.2)
LDH SERPL L TO P-CCNC: 2.52 MMOL/L (ref 0.5–2.2)
MCH, POC: NORMAL
MCHC, POC: NORMAL
MCV, POC: NORMAL
MPV, POC: NORMAL
PCO2 BLDA: 31.9 MMHG (ref 35–45)
PCO2 BLDA: 36.6 MMHG (ref 35–45)
PH SMN: 7.38 [PH] (ref 7.35–7.45)
PH SMN: 7.45 [PH] (ref 7.35–7.45)
PO2 BLDA: 48 MMHG (ref 40–60)
PO2 BLDA: 55 MMHG (ref 40–60)
POC ALT (SGPT): 11 U/L (ref 10–47)
POC AST (SGOT): 15 U/L (ref 11–38)
POC BE: -1 MMOL/L
POC BE: -3 MMOL/L
POC PLATELET COUNT: NORMAL
POC SATURATED O2: 86 % (ref 95–100)
POC SATURATED O2: 88 % (ref 95–100)
POC TCO2: 23 MMOL/L (ref 18–33)
POC TCO2: 23 MMOL/L (ref 24–29)
POC TCO2: 23 MMOL/L (ref 24–29)
POTASSIUM BLD-SCNC: 3.3 MMOL/L (ref 3.6–5.1)
PROTEIN, POC: 6.7 G/DL (ref 6.4–8.1)
RBC, POC: NORMAL
RDW, POC: NORMAL
SAMPLE: ABNORMAL
SAMPLE: ABNORMAL
SITE: ABNORMAL
SITE: ABNORMAL
SODIUM BLD-SCNC: 133 MMOL/L (ref 128–145)
WBC, POC: NORMAL

## 2023-11-18 PROCEDURE — 93005 ELECTROCARDIOGRAM TRACING: CPT | Mod: ER

## 2023-11-18 PROCEDURE — 11000001 HC ACUTE MED/SURG PRIVATE ROOM

## 2023-11-18 PROCEDURE — 82803 BLOOD GASES ANY COMBINATION: CPT | Mod: ER

## 2023-11-18 PROCEDURE — 93010 EKG 12-LEAD: ICD-10-PCS | Mod: ,,, | Performed by: INTERNAL MEDICINE

## 2023-11-18 PROCEDURE — 63600175 PHARM REV CODE 636 W HCPCS: Mod: ER

## 2023-11-18 PROCEDURE — 96365 THER/PROPH/DIAG IV INF INIT: CPT | Mod: ER

## 2023-11-18 PROCEDURE — 25500020 PHARM REV CODE 255: Mod: ER

## 2023-11-18 PROCEDURE — 51702 INSERT TEMP BLADDER CATH: CPT | Mod: ER

## 2023-11-18 PROCEDURE — 99291 CRITICAL CARE FIRST HOUR: CPT | Mod: ER

## 2023-11-18 PROCEDURE — 80053 COMPREHEN METABOLIC PANEL: CPT | Mod: ER

## 2023-11-18 PROCEDURE — 25000003 PHARM REV CODE 250: Mod: ER

## 2023-11-18 PROCEDURE — 96375 TX/PRO/DX INJ NEW DRUG ADDON: CPT | Mod: ER

## 2023-11-18 PROCEDURE — 96361 HYDRATE IV INFUSION ADD-ON: CPT | Mod: ER

## 2023-11-18 PROCEDURE — 85025 COMPLETE CBC W/AUTO DIFF WBC: CPT | Mod: ER

## 2023-11-18 PROCEDURE — 93010 ELECTROCARDIOGRAM REPORT: CPT | Mod: ,,, | Performed by: INTERNAL MEDICINE

## 2023-11-18 PROCEDURE — 83605 ASSAY OF LACTIC ACID: CPT | Mod: ER

## 2023-11-18 PROCEDURE — 80053 COMPREHEN METABOLIC PANEL: CPT

## 2023-11-18 PROCEDURE — 87040 BLOOD CULTURE FOR BACTERIA: CPT | Mod: 59

## 2023-11-18 RX ORDER — CLINDAMYCIN PHOSPHATE 900 MG/50ML
900 INJECTION, SOLUTION INTRAVENOUS
Status: COMPLETED | OUTPATIENT
Start: 2023-11-18 | End: 2023-11-18

## 2023-11-18 RX ORDER — MORPHINE SULFATE 4 MG/ML
8 INJECTION, SOLUTION INTRAMUSCULAR; INTRAVENOUS
Status: COMPLETED | OUTPATIENT
Start: 2023-11-18 | End: 2023-11-18

## 2023-11-18 RX ORDER — SODIUM CHLORIDE 9 MG/ML
250 INJECTION, SOLUTION INTRAVENOUS CONTINUOUS
Status: DISCONTINUED | OUTPATIENT
Start: 2023-11-19 | End: 2023-11-19

## 2023-11-18 RX ADMIN — MORPHINE SULFATE 8 MG: 4 INJECTION, SOLUTION INTRAMUSCULAR; INTRAVENOUS at 09:11

## 2023-11-18 RX ADMIN — CLINDAMYCIN IN 5 PERCENT DEXTROSE 900 MG: 18 INJECTION, SOLUTION INTRAVENOUS at 10:11

## 2023-11-18 RX ADMIN — SODIUM CHLORIDE 250 ML/HR: 9 INJECTION, SOLUTION INTRAVENOUS at 11:11

## 2023-11-18 RX ADMIN — SODIUM CHLORIDE 1641 ML: 9 INJECTION, SOLUTION INTRAVENOUS at 08:11

## 2023-11-18 RX ADMIN — PIPERACILLIN AND TAZOBACTAM 4.5 G: 4; .5 INJECTION, POWDER, LYOPHILIZED, FOR SOLUTION INTRAVENOUS; PARENTERAL at 08:11

## 2023-11-18 RX ADMIN — IOHEXOL 100 ML: 350 INJECTION, SOLUTION INTRAVENOUS at 09:11

## 2023-11-18 NOTE — Clinical Note
Diagnosis: Cellulitis [297244]   Future Attending Provider: JOSE CARLOS STOKES [[id number]]   Admitting Provider:: FRANCINE WONG [[id number]]   Reason for IP Medical Treatment  (Clinical interventions that can only be accomplished in the IP setting? ) :: IV antibiotics, evaluation by surgery, possible surgical debridement   I certify that Inpatient services for greater than or equal to 2 midnights are medically necessary:: Yes   Plans for Post-Acute care--if anticipated (pick the single best option):: A. No post acute care anticipated at this time

## 2023-11-19 ENCOUNTER — ANESTHESIA (OUTPATIENT)
Dept: SURGERY | Facility: HOSPITAL | Age: 48
DRG: 871 | End: 2023-11-19
Payer: MEDICAID

## 2023-11-19 ENCOUNTER — ANESTHESIA EVENT (OUTPATIENT)
Dept: SURGERY | Facility: HOSPITAL | Age: 48
DRG: 871 | End: 2023-11-19
Payer: MEDICAID

## 2023-11-19 PROBLEM — A41.9 SEPSIS: Status: ACTIVE | Noted: 2023-11-19

## 2023-11-19 PROBLEM — L03.314 CELLULITIS OF GROIN, RIGHT: Status: ACTIVE | Noted: 2023-11-19

## 2023-11-19 PROBLEM — E66.01 MORBID OBESITY: Status: ACTIVE | Noted: 2023-11-19

## 2023-11-19 PROBLEM — E11.9 TYPE 2 DIABETES MELLITUS, WITHOUT LONG-TERM CURRENT USE OF INSULIN: Status: ACTIVE | Noted: 2023-11-19

## 2023-11-19 LAB
ABO + RH BLD: NORMAL
ALBUMIN SERPL BCP-MCNC: 2.3 G/DL (ref 3.5–5.2)
ALBUMIN SERPL BCP-MCNC: 2.5 G/DL (ref 3.5–5.2)
ALP SERPL-CCNC: 174 U/L (ref 55–135)
ALP SERPL-CCNC: 183 U/L (ref 55–135)
ALT SERPL W/O P-5'-P-CCNC: 11 U/L (ref 10–44)
ALT SERPL W/O P-5'-P-CCNC: 11 U/L (ref 10–44)
ANION GAP SERPL CALC-SCNC: 12 MMOL/L (ref 8–16)
ANION GAP SERPL CALC-SCNC: 13 MMOL/L (ref 8–16)
AST SERPL-CCNC: 11 U/L (ref 10–40)
AST SERPL-CCNC: 9 U/L (ref 10–40)
B-HCG UR QL: NEGATIVE
BACTERIA #/AREA URNS HPF: NORMAL /HPF
BASOPHILS # BLD AUTO: 0.05 K/UL (ref 0–0.2)
BASOPHILS NFR BLD: 0.3 % (ref 0–1.9)
BILIRUB SERPL-MCNC: 1.7 MG/DL (ref 0.1–1)
BILIRUB SERPL-MCNC: 1.8 MG/DL (ref 0.1–1)
BILIRUB UR QL STRIP: NEGATIVE
BLD GP AB SCN CELLS X3 SERPL QL: NORMAL
BUN SERPL-MCNC: 9 MG/DL (ref 6–20)
BUN SERPL-MCNC: 9 MG/DL (ref 6–20)
CALCIUM SERPL-MCNC: 8.7 MG/DL (ref 8.7–10.5)
CALCIUM SERPL-MCNC: 9.3 MG/DL (ref 8.7–10.5)
CHLORIDE SERPL-SCNC: 100 MMOL/L (ref 95–110)
CHLORIDE SERPL-SCNC: 101 MMOL/L (ref 95–110)
CK SERPL-CCNC: 51 U/L (ref 20–180)
CLARITY UR: CLEAR
CO2 SERPL-SCNC: 20 MMOL/L (ref 23–29)
CO2 SERPL-SCNC: 22 MMOL/L (ref 23–29)
COLOR UR: YELLOW
CREAT SERPL-MCNC: 0.9 MG/DL (ref 0.5–1.4)
CREAT SERPL-MCNC: 0.9 MG/DL (ref 0.5–1.4)
CRP SERPL-MCNC: 455.6 MG/L (ref 0–8.2)
CTP QC/QA: YES
DIFFERENTIAL METHOD: ABNORMAL
EOSINOPHIL # BLD AUTO: 0 K/UL (ref 0–0.5)
EOSINOPHIL NFR BLD: 0 % (ref 0–8)
ERYTHROCYTE [DISTWIDTH] IN BLOOD BY AUTOMATED COUNT: 13.3 % (ref 11.5–14.5)
ERYTHROCYTE [SEDIMENTATION RATE] IN BLOOD BY WESTERGREN METHOD: 40 MM/HR (ref 0–20)
EST. GFR  (NO RACE VARIABLE): >60 ML/MIN/1.73 M^2
EST. GFR  (NO RACE VARIABLE): >60 ML/MIN/1.73 M^2
GLUCOSE SERPL-MCNC: 356 MG/DL (ref 70–110)
GLUCOSE SERPL-MCNC: 389 MG/DL (ref 70–110)
GLUCOSE UR QL STRIP: ABNORMAL
HCT VFR BLD AUTO: 36.8 % (ref 37–48.5)
HGB BLD-MCNC: 11.7 G/DL (ref 12–16)
HGB UR QL STRIP: NEGATIVE
HYALINE CASTS #/AREA URNS LPF: 0 /LPF
IMM GRANULOCYTES # BLD AUTO: 0.07 K/UL (ref 0–0.04)
IMM GRANULOCYTES NFR BLD AUTO: 0.5 % (ref 0–0.5)
INR PPP: 1.1 (ref 0.8–1.2)
KETONES UR QL STRIP: ABNORMAL
LACTATE SERPL-SCNC: 1.6 MMOL/L (ref 0.5–2.2)
LEUKOCYTE ESTERASE UR QL STRIP: NEGATIVE
LYMPHOCYTES # BLD AUTO: 1 K/UL (ref 1–4.8)
LYMPHOCYTES NFR BLD: 6.9 % (ref 18–48)
MAGNESIUM SERPL-MCNC: 1.6 MG/DL (ref 1.6–2.6)
MCH RBC QN AUTO: 26.3 PG (ref 27–31)
MCHC RBC AUTO-ENTMCNC: 31.8 G/DL (ref 32–36)
MCV RBC AUTO: 83 FL (ref 82–98)
MICROSCOPIC COMMENT: NORMAL
MONOCYTES # BLD AUTO: 1.1 K/UL (ref 0.3–1)
MONOCYTES NFR BLD: 7.4 % (ref 4–15)
NEUTROPHILS # BLD AUTO: 12.2 K/UL (ref 1.8–7.7)
NEUTROPHILS NFR BLD: 84.9 % (ref 38–73)
NITRITE UR QL STRIP: NEGATIVE
NRBC BLD-RTO: 0 /100 WBC
PH UR STRIP: 6 [PH] (ref 5–8)
PHOSPHATE SERPL-MCNC: 3.5 MG/DL (ref 2.7–4.5)
PLATELET # BLD AUTO: 280 K/UL (ref 150–450)
PMV BLD AUTO: 11.6 FL (ref 9.2–12.9)
POCT GLUCOSE: 333 MG/DL (ref 70–110)
POCT GLUCOSE: 337 MG/DL (ref 70–110)
POCT GLUCOSE: 365 MG/DL (ref 70–110)
POCT GLUCOSE: 370 MG/DL (ref 70–110)
POCT GLUCOSE: 376 MG/DL (ref 70–110)
POTASSIUM SERPL-SCNC: 3.4 MMOL/L (ref 3.5–5.1)
POTASSIUM SERPL-SCNC: 3.6 MMOL/L (ref 3.5–5.1)
PROT SERPL-MCNC: 6.8 G/DL (ref 6–8.4)
PROT SERPL-MCNC: 6.8 G/DL (ref 6–8.4)
PROT UR QL STRIP: ABNORMAL
PROTHROMBIN TIME: 11.7 SEC (ref 9–12.5)
RBC # BLD AUTO: 4.45 M/UL (ref 4–5.4)
RBC #/AREA URNS HPF: 0 /HPF (ref 0–4)
SODIUM SERPL-SCNC: 133 MMOL/L (ref 136–145)
SODIUM SERPL-SCNC: 135 MMOL/L (ref 136–145)
SP GR UR STRIP: >1.03 (ref 1–1.03)
SPECIMEN OUTDATE: NORMAL
URN SPEC COLLECT METH UR: ABNORMAL
UROBILINOGEN UR STRIP-ACNC: NEGATIVE EU/DL
WBC # BLD AUTO: 14.37 K/UL (ref 3.9–12.7)
WBC #/AREA URNS HPF: 0 /HPF (ref 0–5)
YEAST URNS QL MICRO: NORMAL

## 2023-11-19 PROCEDURE — 87147 CULTURE TYPE IMMUNOLOGIC: CPT | Mod: 59 | Performed by: SURGERY

## 2023-11-19 PROCEDURE — 86140 C-REACTIVE PROTEIN: CPT | Performed by: STUDENT IN AN ORGANIZED HEALTH CARE EDUCATION/TRAINING PROGRAM

## 2023-11-19 PROCEDURE — 85652 RBC SED RATE AUTOMATED: CPT | Performed by: STUDENT IN AN ORGANIZED HEALTH CARE EDUCATION/TRAINING PROGRAM

## 2023-11-19 PROCEDURE — D9220A PRA ANESTHESIA: Mod: CRNA,,, | Performed by: NURSE ANESTHETIST, CERTIFIED REGISTERED

## 2023-11-19 PROCEDURE — 83735 ASSAY OF MAGNESIUM: CPT | Performed by: STUDENT IN AN ORGANIZED HEALTH CARE EDUCATION/TRAINING PROGRAM

## 2023-11-19 PROCEDURE — 63600175 PHARM REV CODE 636 W HCPCS: Performed by: ANESTHESIOLOGY

## 2023-11-19 PROCEDURE — 63600175 PHARM REV CODE 636 W HCPCS: Performed by: NURSE ANESTHETIST, CERTIFIED REGISTERED

## 2023-11-19 PROCEDURE — 81000 URINALYSIS NONAUTO W/SCOPE: CPT | Performed by: STUDENT IN AN ORGANIZED HEALTH CARE EDUCATION/TRAINING PROGRAM

## 2023-11-19 PROCEDURE — 63600175 PHARM REV CODE 636 W HCPCS: Performed by: STUDENT IN AN ORGANIZED HEALTH CARE EDUCATION/TRAINING PROGRAM

## 2023-11-19 PROCEDURE — 36415 COLL VENOUS BLD VENIPUNCTURE: CPT | Performed by: STUDENT IN AN ORGANIZED HEALTH CARE EDUCATION/TRAINING PROGRAM

## 2023-11-19 PROCEDURE — 36000707: Performed by: SURGERY

## 2023-11-19 PROCEDURE — 99223 PR INITIAL HOSPITAL CARE,LEVL III: ICD-10-PCS | Mod: ,,, | Performed by: SURGERY

## 2023-11-19 PROCEDURE — 83605 ASSAY OF LACTIC ACID: CPT | Performed by: STUDENT IN AN ORGANIZED HEALTH CARE EDUCATION/TRAINING PROGRAM

## 2023-11-19 PROCEDURE — 71000033 HC RECOVERY, INTIAL HOUR: Performed by: SURGERY

## 2023-11-19 PROCEDURE — 87070 CULTURE OTHR SPECIMN AEROBIC: CPT | Performed by: SURGERY

## 2023-11-19 PROCEDURE — 84100 ASSAY OF PHOSPHORUS: CPT | Performed by: STUDENT IN AN ORGANIZED HEALTH CARE EDUCATION/TRAINING PROGRAM

## 2023-11-19 PROCEDURE — D9220A PRA ANESTHESIA: Mod: ANES,,, | Performed by: ANESTHESIOLOGY

## 2023-11-19 PROCEDURE — 25000003 PHARM REV CODE 250: Performed by: STUDENT IN AN ORGANIZED HEALTH CARE EDUCATION/TRAINING PROGRAM

## 2023-11-19 PROCEDURE — 25000003 PHARM REV CODE 250: Performed by: NURSE ANESTHETIST, CERTIFIED REGISTERED

## 2023-11-19 PROCEDURE — D9220A PRA ANESTHESIA: ICD-10-PCS | Mod: CRNA,,, | Performed by: NURSE ANESTHETIST, CERTIFIED REGISTERED

## 2023-11-19 PROCEDURE — 87076 CULTURE ANAEROBE IDENT EACH: CPT | Performed by: SURGERY

## 2023-11-19 PROCEDURE — 86900 BLOOD TYPING SEROLOGIC ABO: CPT | Performed by: STUDENT IN AN ORGANIZED HEALTH CARE EDUCATION/TRAINING PROGRAM

## 2023-11-19 PROCEDURE — 99223 1ST HOSP IP/OBS HIGH 75: CPT | Mod: ,,, | Performed by: SURGERY

## 2023-11-19 PROCEDURE — 11000001 HC ACUTE MED/SURG PRIVATE ROOM

## 2023-11-19 PROCEDURE — 87075 CULTR BACTERIA EXCEPT BLOOD: CPT | Performed by: SURGERY

## 2023-11-19 PROCEDURE — 37000008 HC ANESTHESIA 1ST 15 MINUTES: Performed by: SURGERY

## 2023-11-19 PROCEDURE — 85610 PROTHROMBIN TIME: CPT | Performed by: STUDENT IN AN ORGANIZED HEALTH CARE EDUCATION/TRAINING PROGRAM

## 2023-11-19 PROCEDURE — 37000009 HC ANESTHESIA EA ADD 15 MINS: Performed by: SURGERY

## 2023-11-19 PROCEDURE — 25000003 PHARM REV CODE 250: Performed by: HOSPITALIST

## 2023-11-19 PROCEDURE — 10061 PR DRAIN SKIN ABSCESS COMPLIC: ICD-10-PCS | Mod: ,,, | Performed by: SURGERY

## 2023-11-19 PROCEDURE — 10061 I&D ABSCESS COMP/MULTIPLE: CPT | Mod: ,,, | Performed by: SURGERY

## 2023-11-19 PROCEDURE — 36000706: Performed by: SURGERY

## 2023-11-19 PROCEDURE — 80053 COMPREHEN METABOLIC PANEL: CPT | Performed by: STUDENT IN AN ORGANIZED HEALTH CARE EDUCATION/TRAINING PROGRAM

## 2023-11-19 PROCEDURE — 82550 ASSAY OF CK (CPK): CPT | Performed by: STUDENT IN AN ORGANIZED HEALTH CARE EDUCATION/TRAINING PROGRAM

## 2023-11-19 PROCEDURE — 85025 COMPLETE CBC W/AUTO DIFF WBC: CPT | Performed by: STUDENT IN AN ORGANIZED HEALTH CARE EDUCATION/TRAINING PROGRAM

## 2023-11-19 PROCEDURE — 25000003 PHARM REV CODE 250: Mod: ER | Performed by: INTERNAL MEDICINE

## 2023-11-19 PROCEDURE — 87086 URINE CULTURE/COLONY COUNT: CPT

## 2023-11-19 PROCEDURE — D9220A PRA ANESTHESIA: ICD-10-PCS | Mod: ANES,,, | Performed by: ANESTHESIOLOGY

## 2023-11-19 RX ORDER — METHOCARBAMOL 500 MG/1
500 TABLET, FILM COATED ORAL 4 TIMES DAILY
Status: DISCONTINUED | OUTPATIENT
Start: 2023-11-19 | End: 2023-11-24 | Stop reason: HOSPADM

## 2023-11-19 RX ORDER — MAG HYDROX/ALUMINUM HYD/SIMETH 200-200-20
30 SUSPENSION, ORAL (FINAL DOSE FORM) ORAL 4 TIMES DAILY PRN
Status: DISCONTINUED | OUTPATIENT
Start: 2023-11-19 | End: 2023-11-24 | Stop reason: HOSPADM

## 2023-11-19 RX ORDER — ACETAMINOPHEN 325 MG/1
650 TABLET ORAL EVERY 4 HOURS PRN
Status: DISCONTINUED | OUTPATIENT
Start: 2023-11-19 | End: 2023-11-19

## 2023-11-19 RX ORDER — SODIUM,POTASSIUM PHOSPHATES 280-250MG
2 POWDER IN PACKET (EA) ORAL
Status: DISCONTINUED | OUTPATIENT
Start: 2023-11-19 | End: 2023-11-24 | Stop reason: HOSPADM

## 2023-11-19 RX ORDER — PROCHLORPERAZINE EDISYLATE 5 MG/ML
5 INJECTION INTRAMUSCULAR; INTRAVENOUS EVERY 6 HOURS PRN
Status: DISCONTINUED | OUTPATIENT
Start: 2023-11-19 | End: 2023-11-24 | Stop reason: HOSPADM

## 2023-11-19 RX ORDER — NALOXONE HCL 0.4 MG/ML
0.02 VIAL (ML) INJECTION
Status: DISCONTINUED | OUTPATIENT
Start: 2023-11-19 | End: 2023-11-24 | Stop reason: HOSPADM

## 2023-11-19 RX ORDER — OXYCODONE HYDROCHLORIDE 5 MG/1
5 TABLET ORAL EVERY 4 HOURS PRN
Status: DISCONTINUED | OUTPATIENT
Start: 2023-11-19 | End: 2023-11-22

## 2023-11-19 RX ORDER — HYDROCODONE BITARTRATE AND ACETAMINOPHEN 5; 325 MG/1; MG/1
1 TABLET ORAL EVERY 6 HOURS PRN
Status: DISCONTINUED | OUTPATIENT
Start: 2023-11-19 | End: 2023-11-22

## 2023-11-19 RX ORDER — PROPOFOL 10 MG/ML
VIAL (ML) INTRAVENOUS
Status: DISCONTINUED | OUTPATIENT
Start: 2023-11-19 | End: 2023-11-19

## 2023-11-19 RX ORDER — IPRATROPIUM BROMIDE AND ALBUTEROL SULFATE 2.5; .5 MG/3ML; MG/3ML
3 SOLUTION RESPIRATORY (INHALATION) EVERY 4 HOURS PRN
Status: DISCONTINUED | OUTPATIENT
Start: 2023-11-19 | End: 2023-11-24 | Stop reason: HOSPADM

## 2023-11-19 RX ORDER — INSULIN ASPART 100 [IU]/ML
0-5 INJECTION, SOLUTION INTRAVENOUS; SUBCUTANEOUS
Status: DISCONTINUED | OUTPATIENT
Start: 2023-11-19 | End: 2023-11-24 | Stop reason: HOSPADM

## 2023-11-19 RX ORDER — SODIUM CHLORIDE 0.9 % (FLUSH) 0.9 %
10 SYRINGE (ML) INJECTION
Status: DISCONTINUED | OUTPATIENT
Start: 2023-11-19 | End: 2023-11-19

## 2023-11-19 RX ORDER — INSULIN ASPART 100 [IU]/ML
5 INJECTION, SOLUTION INTRAVENOUS; SUBCUTANEOUS
Status: COMPLETED | OUTPATIENT
Start: 2023-11-19 | End: 2023-11-19

## 2023-11-19 RX ORDER — ROCURONIUM BROMIDE 10 MG/ML
INJECTION, SOLUTION INTRAVENOUS
Status: DISCONTINUED | OUTPATIENT
Start: 2023-11-19 | End: 2023-11-19

## 2023-11-19 RX ORDER — LANOLIN ALCOHOL/MO/W.PET/CERES
800 CREAM (GRAM) TOPICAL
Status: DISCONTINUED | OUTPATIENT
Start: 2023-11-19 | End: 2023-11-24 | Stop reason: HOSPADM

## 2023-11-19 RX ORDER — TALC
6 POWDER (GRAM) TOPICAL NIGHTLY PRN
Status: DISCONTINUED | OUTPATIENT
Start: 2023-11-19 | End: 2023-11-24 | Stop reason: HOSPADM

## 2023-11-19 RX ORDER — IBUPROFEN 200 MG
16 TABLET ORAL
Status: DISCONTINUED | OUTPATIENT
Start: 2023-11-19 | End: 2023-11-24 | Stop reason: HOSPADM

## 2023-11-19 RX ORDER — ONDANSETRON 2 MG/ML
INJECTION INTRAMUSCULAR; INTRAVENOUS
Status: DISCONTINUED | OUTPATIENT
Start: 2023-11-19 | End: 2023-11-19

## 2023-11-19 RX ORDER — PHENYLEPHRINE HYDROCHLORIDE 10 MG/ML
INJECTION INTRAVENOUS
Status: DISCONTINUED | OUTPATIENT
Start: 2023-11-19 | End: 2023-11-19

## 2023-11-19 RX ORDER — OXYCODONE HYDROCHLORIDE 5 MG/1
10 TABLET ORAL EVERY 6 HOURS PRN
Status: DISCONTINUED | OUTPATIENT
Start: 2023-11-19 | End: 2023-11-22

## 2023-11-19 RX ORDER — OXYCODONE HYDROCHLORIDE 5 MG/1
10 TABLET ORAL EVERY 6 HOURS PRN
Status: DISCONTINUED | OUTPATIENT
Start: 2023-11-19 | End: 2023-11-19 | Stop reason: SDUPTHER

## 2023-11-19 RX ORDER — CLINDAMYCIN PHOSPHATE 900 MG/50ML
900 INJECTION, SOLUTION INTRAVENOUS
Status: DISCONTINUED | OUTPATIENT
Start: 2023-11-19 | End: 2023-11-21

## 2023-11-19 RX ORDER — SUCCINYLCHOLINE CHLORIDE 20 MG/ML
INJECTION INTRAMUSCULAR; INTRAVENOUS
Status: DISCONTINUED | OUTPATIENT
Start: 2023-11-19 | End: 2023-11-19

## 2023-11-19 RX ORDER — FENTANYL CITRATE 50 UG/ML
INJECTION, SOLUTION INTRAMUSCULAR; INTRAVENOUS
Status: DISCONTINUED | OUTPATIENT
Start: 2023-11-19 | End: 2023-11-19

## 2023-11-19 RX ORDER — IBUPROFEN 200 MG
24 TABLET ORAL
Status: DISCONTINUED | OUTPATIENT
Start: 2023-11-19 | End: 2023-11-24 | Stop reason: HOSPADM

## 2023-11-19 RX ORDER — ONDANSETRON 2 MG/ML
4 INJECTION INTRAMUSCULAR; INTRAVENOUS DAILY PRN
Status: DISCONTINUED | OUTPATIENT
Start: 2023-11-19 | End: 2023-11-19

## 2023-11-19 RX ORDER — SODIUM CHLORIDE 0.9 % (FLUSH) 0.9 %
10 SYRINGE (ML) INJECTION EVERY 12 HOURS PRN
Status: DISCONTINUED | OUTPATIENT
Start: 2023-11-19 | End: 2023-11-24 | Stop reason: HOSPADM

## 2023-11-19 RX ORDER — BISACODYL 10 MG
10 SUPPOSITORY, RECTAL RECTAL DAILY PRN
Status: DISCONTINUED | OUTPATIENT
Start: 2023-11-19 | End: 2023-11-24 | Stop reason: HOSPADM

## 2023-11-19 RX ORDER — ACETAMINOPHEN 325 MG/1
650 TABLET ORAL EVERY 8 HOURS PRN
Status: DISCONTINUED | OUTPATIENT
Start: 2023-11-19 | End: 2023-11-19

## 2023-11-19 RX ORDER — SODIUM CHLORIDE 9 MG/ML
INJECTION, SOLUTION INTRAVENOUS CONTINUOUS
Status: ACTIVE | OUTPATIENT
Start: 2023-11-19 | End: 2023-11-20

## 2023-11-19 RX ORDER — HEPARIN SODIUM 5000 [USP'U]/ML
5000 INJECTION, SOLUTION INTRAVENOUS; SUBCUTANEOUS EVERY 8 HOURS
Status: DISCONTINUED | OUTPATIENT
Start: 2023-11-19 | End: 2023-11-24 | Stop reason: HOSPADM

## 2023-11-19 RX ORDER — HYDROMORPHONE HYDROCHLORIDE 2 MG/ML
0.2 INJECTION, SOLUTION INTRAMUSCULAR; INTRAVENOUS; SUBCUTANEOUS EVERY 5 MIN PRN
Status: DISCONTINUED | OUTPATIENT
Start: 2023-11-19 | End: 2023-11-19

## 2023-11-19 RX ORDER — LIDOCAINE HYDROCHLORIDE 20 MG/ML
INJECTION INTRAVENOUS
Status: DISCONTINUED | OUTPATIENT
Start: 2023-11-19 | End: 2023-11-19

## 2023-11-19 RX ORDER — SIMETHICONE 80 MG
1 TABLET,CHEWABLE ORAL 4 TIMES DAILY PRN
Status: DISCONTINUED | OUTPATIENT
Start: 2023-11-19 | End: 2023-11-24 | Stop reason: HOSPADM

## 2023-11-19 RX ORDER — POLYETHYLENE GLYCOL 3350 17 G/17G
17 POWDER, FOR SOLUTION ORAL DAILY
Status: DISCONTINUED | OUTPATIENT
Start: 2023-11-19 | End: 2023-11-24 | Stop reason: HOSPADM

## 2023-11-19 RX ORDER — GLUCAGON 1 MG
1 KIT INJECTION
Status: DISCONTINUED | OUTPATIENT
Start: 2023-11-19 | End: 2023-11-24 | Stop reason: HOSPADM

## 2023-11-19 RX ORDER — PROCHLORPERAZINE EDISYLATE 5 MG/ML
INJECTION INTRAMUSCULAR; INTRAVENOUS
Status: DISCONTINUED | OUTPATIENT
Start: 2023-11-19 | End: 2023-11-19

## 2023-11-19 RX ORDER — MIDAZOLAM HYDROCHLORIDE 1 MG/ML
INJECTION, SOLUTION INTRAMUSCULAR; INTRAVENOUS
Status: DISCONTINUED | OUTPATIENT
Start: 2023-11-19 | End: 2023-11-19

## 2023-11-19 RX ORDER — GABAPENTIN 300 MG/1
300 CAPSULE ORAL 3 TIMES DAILY
Status: DISCONTINUED | OUTPATIENT
Start: 2023-11-19 | End: 2023-11-24 | Stop reason: HOSPADM

## 2023-11-19 RX ORDER — ONDANSETRON 2 MG/ML
4 INJECTION INTRAMUSCULAR; INTRAVENOUS EVERY 8 HOURS PRN
Status: DISCONTINUED | OUTPATIENT
Start: 2023-11-19 | End: 2023-11-24 | Stop reason: HOSPADM

## 2023-11-19 RX ORDER — ACETAMINOPHEN 500 MG
1000 TABLET ORAL EVERY 8 HOURS
Status: DISCONTINUED | OUTPATIENT
Start: 2023-11-19 | End: 2023-11-22

## 2023-11-19 RX ORDER — MUPIROCIN 20 MG/G
OINTMENT TOPICAL 2 TIMES DAILY
Status: COMPLETED | OUTPATIENT
Start: 2023-11-19 | End: 2023-11-23

## 2023-11-19 RX ADMIN — PHENYLEPHRINE HYDROCHLORIDE 100 MCG: 10 INJECTION INTRAVENOUS at 09:11

## 2023-11-19 RX ADMIN — METHOCARBAMOL 500 MG: 500 TABLET ORAL at 02:11

## 2023-11-19 RX ADMIN — CLINDAMYCIN PHOSPHATE 900 MG: 900 INJECTION, SOLUTION INTRAVENOUS at 08:11

## 2023-11-19 RX ADMIN — PHENYLEPHRINE HYDROCHLORIDE 100 MCG: 10 INJECTION INTRAVENOUS at 08:11

## 2023-11-19 RX ADMIN — SUGAMMADEX 200 MG: 100 INJECTION, SOLUTION INTRAVENOUS at 09:11

## 2023-11-19 RX ADMIN — SODIUM CHLORIDE, SODIUM LACTATE, POTASSIUM CHLORIDE, AND CALCIUM CHLORIDE: .6; .31; .03; .02 INJECTION, SOLUTION INTRAVENOUS at 08:11

## 2023-11-19 RX ADMIN — GLYCOPYRROLATE 0.1 MG: 0.2 INJECTION, SOLUTION INTRAMUSCULAR; INTRAVITREAL at 08:11

## 2023-11-19 RX ADMIN — INSULIN ASPART 5 UNITS: 100 INJECTION, SOLUTION INTRAVENOUS; SUBCUTANEOUS at 05:11

## 2023-11-19 RX ADMIN — GABAPENTIN 300 MG: 300 CAPSULE ORAL at 09:11

## 2023-11-19 RX ADMIN — PROPOFOL 30 MG: 10 INJECTION, EMULSION INTRAVENOUS at 09:11

## 2023-11-19 RX ADMIN — VANCOMYCIN HYDROCHLORIDE 2000 MG: 500 INJECTION, POWDER, LYOPHILIZED, FOR SOLUTION INTRAVENOUS at 02:11

## 2023-11-19 RX ADMIN — PHENYLEPHRINE HYDROCHLORIDE 200 MCG: 10 INJECTION INTRAVENOUS at 08:11

## 2023-11-19 RX ADMIN — PIPERACILLIN AND TAZOBACTAM 4.5 G: 4; .5 INJECTION, POWDER, LYOPHILIZED, FOR SOLUTION INTRAVENOUS; PARENTERAL at 07:11

## 2023-11-19 RX ADMIN — PROPOFOL 200 MG: 10 INJECTION, EMULSION INTRAVENOUS at 08:11

## 2023-11-19 RX ADMIN — SODIUM CHLORIDE: 9 INJECTION, SOLUTION INTRAVENOUS at 11:11

## 2023-11-19 RX ADMIN — HEPARIN SODIUM 5000 UNITS: 5000 INJECTION INTRAVENOUS; SUBCUTANEOUS at 09:11

## 2023-11-19 RX ADMIN — VANCOMYCIN HYDROCHLORIDE 2500 MG: 500 INJECTION, POWDER, LYOPHILIZED, FOR SOLUTION INTRAVENOUS at 01:11

## 2023-11-19 RX ADMIN — GABAPENTIN 300 MG: 300 CAPSULE ORAL at 02:11

## 2023-11-19 RX ADMIN — INSULIN ASPART 4 UNITS: 100 INJECTION, SOLUTION INTRAVENOUS; SUBCUTANEOUS at 11:11

## 2023-11-19 RX ADMIN — HEPARIN SODIUM 5000 UNITS: 5000 INJECTION INTRAVENOUS; SUBCUTANEOUS at 02:11

## 2023-11-19 RX ADMIN — GABAPENTIN 300 MG: 300 CAPSULE ORAL at 11:11

## 2023-11-19 RX ADMIN — ACETAMINOPHEN 1000 MG: 500 TABLET ORAL at 09:11

## 2023-11-19 RX ADMIN — INSULIN ASPART 3 UNITS: 100 INJECTION, SOLUTION INTRAVENOUS; SUBCUTANEOUS at 09:11

## 2023-11-19 RX ADMIN — ACETAMINOPHEN 1000 MG: 500 TABLET ORAL at 02:11

## 2023-11-19 RX ADMIN — CLINDAMYCIN PHOSPHATE 900 MG: 900 INJECTION, SOLUTION INTRAVENOUS at 11:11

## 2023-11-19 RX ADMIN — PROCHLORPERAZINE EDISYLATE 5 MG: 5 INJECTION INTRAMUSCULAR; INTRAVENOUS at 08:11

## 2023-11-19 RX ADMIN — MUPIROCIN: 20 OINTMENT TOPICAL at 11:11

## 2023-11-19 RX ADMIN — FENTANYL CITRATE 100 MCG: 50 INJECTION, SOLUTION INTRAMUSCULAR; INTRAVENOUS at 08:11

## 2023-11-19 RX ADMIN — SODIUM CHLORIDE: 9 INJECTION, SOLUTION INTRAVENOUS at 10:11

## 2023-11-19 RX ADMIN — FENTANYL CITRATE 50 MCG: 50 INJECTION, SOLUTION INTRAMUSCULAR; INTRAVENOUS at 09:11

## 2023-11-19 RX ADMIN — PIPERACILLIN AND TAZOBACTAM 4.5 G: 4; .5 INJECTION, POWDER, LYOPHILIZED, FOR SOLUTION INTRAVENOUS; PARENTERAL at 11:11

## 2023-11-19 RX ADMIN — METHOCARBAMOL 500 MG: 500 TABLET ORAL at 04:11

## 2023-11-19 RX ADMIN — LIDOCAINE HYDROCHLORIDE 100 MG: 20 INJECTION, SOLUTION INTRAVENOUS at 08:11

## 2023-11-19 RX ADMIN — SODIUM CHLORIDE: 9 INJECTION, SOLUTION INTRAVENOUS at 01:11

## 2023-11-19 RX ADMIN — MUPIROCIN: 20 OINTMENT TOPICAL at 09:11

## 2023-11-19 RX ADMIN — PROPOFOL 20 MG: 10 INJECTION, EMULSION INTRAVENOUS at 09:11

## 2023-11-19 RX ADMIN — ONDANSETRON 4 MG: 2 INJECTION, SOLUTION INTRAMUSCULAR; INTRAVENOUS at 08:11

## 2023-11-19 RX ADMIN — ROCURONIUM BROMIDE 5 MG: 10 INJECTION, SOLUTION INTRAVENOUS at 08:11

## 2023-11-19 RX ADMIN — PIPERACILLIN AND TAZOBACTAM 4.5 G: 4; .5 INJECTION, POWDER, LYOPHILIZED, FOR SOLUTION INTRAVENOUS; PARENTERAL at 04:11

## 2023-11-19 RX ADMIN — CLINDAMYCIN PHOSPHATE 900 MG: 900 INJECTION, SOLUTION INTRAVENOUS at 04:11

## 2023-11-19 RX ADMIN — MIDAZOLAM HYDROCHLORIDE 2 MG: 1 INJECTION, SOLUTION INTRAMUSCULAR; INTRAVENOUS at 08:11

## 2023-11-19 RX ADMIN — INSULIN ASPART 5 UNITS: 100 INJECTION, SOLUTION INTRAVENOUS; SUBCUTANEOUS at 10:11

## 2023-11-19 RX ADMIN — METHOCARBAMOL 500 MG: 500 TABLET ORAL at 09:11

## 2023-11-19 RX ADMIN — ACETAMINOPHEN 650 MG: 325 TABLET ORAL at 07:11

## 2023-11-19 RX ADMIN — ROCURONIUM BROMIDE 35 MG: 10 INJECTION, SOLUTION INTRAVENOUS at 08:11

## 2023-11-19 RX ADMIN — SUCCINYLCHOLINE CHLORIDE 200 MG: 20 INJECTION, SOLUTION INTRAMUSCULAR; INTRAVENOUS at 08:11

## 2023-11-19 RX ADMIN — HYDROMORPHONE HYDROCHLORIDE 0.2 MG: 2 INJECTION INTRAMUSCULAR; INTRAVENOUS; SUBCUTANEOUS at 09:11

## 2023-11-19 RX ADMIN — METHOCARBAMOL 500 MG: 500 TABLET ORAL at 10:11

## 2023-11-19 RX ADMIN — HYDROMORPHONE HYDROCHLORIDE 0.2 MG: 2 INJECTION INTRAMUSCULAR; INTRAVENOUS; SUBCUTANEOUS at 10:11

## 2023-11-19 NOTE — ANESTHESIA PROCEDURE NOTES
Intubation    Date/Time: 11/19/2023 8:38 AM    Performed by: Jermain Hudson CRNA  Authorized by: Kurt Erwin Chi, MD    Intubation:     Induction:  Rapid sequence induction    Intubated:  Postinduction    Mask Ventilation:  Not attempted    Attempts:  1    Attempted By:  CRNA    Method of Intubation:  Video laryngoscopy    Blade:  Mcdaniel 3    Laryngeal View Grade: Grade I - full view of cords      Difficult Airway Encountered?: No      Complications:  None    Airway Device:  Oral endotracheal tube    Airway Device Size:  7.0    Style/Cuff Inflation:  Cuffed (inflated to minimal occlusive pressure)    Inflation Amount (mL):  5    Tube secured:  21    Secured at:  The lips    Placement Verified By:  Capnometry    Complicating Factors:  Obesity and oropharyngeal edema or fat    Findings Post-Intubation:  BS equal bilateral and atraumatic/condition of teeth unchanged

## 2023-11-19 NOTE — NURSING
Pt arrived to floor from surgery. Pt in no acute distress. Dressing intact. Michael in place. Rates pain 4/10. Refusing medication at this time. Sisters at bedside.

## 2023-11-19 NOTE — ASSESSMENT & PLAN NOTE
CT abdomen and pelvis showed (1) edematous/inflammatory process involving the inferior medial right groin and perineum and extending posteriorly to the level of the inferior right buttock, there is appearance of edema and inflammation and areas of greater induration inferiorly, (2) there is air density within the soft tissue (possibly of gas producing infectious organisms should be considered).   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

## 2023-11-19 NOTE — PROGRESS NOTES
Recovery care complete. VSS per flow sheet. Drowsy; wakes to voice. Oriented x 4. Pain level tolerable per patient. (3/10)  Dressing to right groin difficult to view due to body habitus; no drainage noted to gordon pad.  Family updated; hand off report to Alanna RIVERA.  Transport pending back to room 441.

## 2023-11-19 NOTE — PROGRESS NOTES
"Pharmacokinetic Initial Assessment: IV Vancomycin    Assessment/Plan:    Initiate intravenous vancomycin with loading dose of 2500 mg once followed by a maintenance dose of vancomycin 2000mg IV every 12 hours  Desired empiric serum trough concentration is 10 to 20 mcg/mL  Draw vancomycin trough level 60 min prior to fourth dose on 11/20 at approximately 1300  Pharmacy will continue to follow and monitor vancomycin.      Please contact pharmacy at extension [phone] with any questions regarding this assessment.     Thank you for the consult,   Lavelle Abbott       Patient brief summary:  Ashanti Thompson is a 48 y.o. female initiated on antimicrobial therapy with IV Vancomycin for treatment of suspected skin & soft tissue infection    Drug Allergies:   Review of patient's allergies indicates:  No Known Allergies    Actual Body Weight:   149.4 kg    Renal Function:   Estimated Creatinine Clearance: 110.5 mL/min (based on SCr of 0.9 mg/dL).,     Dialysis Method (if applicable):  N/A    CBC (last 72 hours):  No results for input(s): "WHITE BLOOD CELL COUNT", "HEMOGLOBIN", "HEMATOCRIT", "PLATELETS", "GRAN%", "LYMPH%", "MONO%", "EOSINOPHIL%", "BASOPHIL%", "DIFFERENTIAL METHOD" in the last 72 hours.    Metabolic Panel (last 72 hours):  Recent Labs   Lab Result Units 11/18/23  2103   Sodium mmol/L 135*   Potassium mmol/L 3.4*   Chloride mmol/L 100   CO2 mmol/L 22*   Glucose mg/dL 356*   BUN mg/dL 9   Creatinine mg/dL 0.9   Albumin g/dL 2.5*   Total Bilirubin mg/dL 1.8*   Alkaline Phosphatase U/L 174*   AST U/L 11   ALT U/L 11       Drug levels (last 3 results):  No results for input(s): "VANCOMYCINRA", "VANCORANDOM", "VANCOMYCINPE", "VANCOPEAK", "VANCOMYCINTR", "VANCOTROUGH" in the last 72 hours.    Microbiologic Results:  Microbiology Results (last 7 days)       Procedure Component Value Units Date/Time    Blood culture x two cultures. Draw prior to antibiotics. [[id number]] Collected: 11/18/23 2005    Order Status: Sent " Specimen: Blood from Peripheral, Hand, Right Updated: 11/18/23 2346    Blood culture x two cultures. Draw prior to antibiotics. [[id number]] Collected: 11/18/23 1945    Order Status: Sent Specimen: Blood from Peripheral, Antecubital, Right Updated: 11/18/23 2346    Urine culture [[id number]]     Order Status: No result Specimen: Urine

## 2023-11-19 NOTE — ED PROVIDER NOTES
Encounter Date: 2023    SCRIBE #1 NOTE: INATI am scribing for, and in the presence of,  Hansel Winter PA-C. I have scribed the following portions of the note - Other sections scribed: HPI, ROS, PE.       History     Chief Complaint   Patient presents with    Abscess     A 49 y/o female presents to the ER c/o abscess to (right) groin crease x 3 days.      Ashanti Thompson is a 48 y.o. female, with a PMHx of DM, who presents to the ED with groin pain which started 3 days ago. Patient states it feel like a boil at the bottom of her buttock. She denies any drainage from the area. She also complains of an associated subjective fever. She attempted treatment with a heating pad, hot water, OTC cream, and Advil; Advil was last taken a few min PTA. No other exacerbating or alleviating factors. Denies any other associated symptoms.  She states that she feels ill.    The history is provided by the patient. No  was used.     Review of patient's allergies indicates:  No Known Allergies  Past Medical History:   Diagnosis Date    Diabetes mellitus     Hyperlipemia     Iron deficiency      Past Surgical History:   Procedure Laterality Date     SECTION       Family History   Problem Relation Age of Onset    Diabetes Father      Social History     Tobacco Use    Smoking status: Never    Smokeless tobacco: Never   Substance Use Topics    Alcohol use: No     Alcohol/week: 0.0 standard drinks of alcohol    Drug use: No     Review of Systems   Constitutional:  Positive for fever (Subjective). Negative for chills.   HENT:  Negative for facial swelling and sore throat.    Eyes:  Negative for visual disturbance.   Respiratory:  Negative for cough and shortness of breath.    Cardiovascular:  Negative for chest pain and palpitations.   Gastrointestinal:  Negative for abdominal pain, nausea and vomiting.   Genitourinary:  Negative for dysuria and hematuria.        (+) Groin pain   Musculoskeletal:   Negative for back pain.   Skin:  Positive for wound. Negative for rash.   Neurological:  Negative for weakness and headaches.   Hematological:  Does not bruise/bleed easily.   Psychiatric/Behavioral: Negative.         Physical Exam     Initial Vitals [11/18/23 1754]   BP Pulse Resp Temp SpO2   102/70 108 18 98.6 °F (37 °C) 96 %      MAP       --         Physical Exam    Nursing note and vitals reviewed.  Constitutional: Vital signs are normal. She appears well-developed and well-nourished. She is Obese . She is cooperative. She appears ill. No distress.   Patient appears ill and uncomfortable.  She was ambulatory.   HENT:   Head: Normocephalic and atraumatic.   Right Ear: Hearing and external ear normal.   Left Ear: Hearing and external ear normal.   Nose: Nose normal.   Eyes: Conjunctivae and EOM are normal.   Neck: Phonation normal.   Normal range of motion.  Cardiovascular:  Regular rhythm, S1 normal, S2 normal and normal heart sounds.   Tachycardia present.   Exam reveals no friction rub.       No murmur heard.  Heart rate 104 beats per minute on my exam.   Pulmonary/Chest: Effort normal. No respiratory distress.   Abdominal: Abdomen is soft. She exhibits no distension. There is no abdominal tenderness.   Musculoskeletal:      Cervical back: Normal range of motion.     Neurological: She is alert and oriented to person, place, and time. GCS eye subscore is 4. GCS verbal subscore is 5. GCS motor subscore is 6.   Skin: Skin is warm. Capillary refill takes less than 2 seconds. No abscess noted. There is erythema.        There is an area of erythema and tenderness to palpation in the right side of the groin as outlined above.  No discrete abscess or boil.  No breaks in the skin.  No drainage.  No necrotic tissue noted.         ED Course   Critical Care    Date/Time: 11/18/2023 10:32 PM    Performed by: Hansel Winter PA-C  Authorized by: Jamir De La Vega MD  Direct patient critical care time: 7  minutes  Additional history critical care time: 5 minutes  Ordering / reviewing critical care time: 7 minutes  Documentation critical care time: 7 minutes  Consulting other physicians critical care time: 10 minutes  Other critical care time: 4 minutes  Total critical care time (exclusive of procedural time) : 40 minutes  Critical care time was exclusive of separately billable procedures and treating other patients and teaching time.  Critical care was necessary to treat or prevent imminent or life-threatening deterioration of the following conditions: sepsis.  Critical care was time spent personally by me on the following activities: ordering and review of laboratory studies, ordering and review of radiographic studies, re-evaluation of patient's condition, ordering and performing treatments and interventions, examination of patient, discussions with consultants, development of treatment plan with patient or surrogate and evaluation of patient's response to treatment.        Labs Reviewed   ISTAT PROCEDURE - Abnormal; Notable for the following components:       Result Value    POC PH 7.454 (*)     POC PCO2 31.9 (*)     POC HCO3 22.4 (*)     POC Lactate 2.52 (*)     POC TCO2 23 (*)     All other components within normal limits   CULTURE, BLOOD   CULTURE, BLOOD   CULTURE, URINE   COMPREHENSIVE METABOLIC PANEL   POCT CBC   POCT URINALYSIS(INSTRUMENT)   POCT CMP          Imaging Results               CT Abdomen Pelvis With IV Contrast (Final result)  Result time 11/18/23 21:46:09      Final result by Bean Jolley MD (11/18/23 21:46:09)                   Impression:      Edematous/inflammatory process involving the inferior medial right groin and perineum and extending posteriorly to the level of the inferior right buttock, there is appearance of edema and inflammation and areas of greater induration inferiorly, however well-formed fluid collection is not seen.    Along the aforementioned distribution of  abnormality there is air density within the soft tissues, correlation for any history of instrumentation or laceration to account for this is needed, in the absence of aforementioned the possibly of gas producing infectious organisms should be considered.    Heterogeneous appearance of the gallbladder with distention without pericholecystic inflammatory change, may relate to sludge and cholelithiasis, the possibility of an intraluminal lesion would be in the differential, ultrasound follow-up is recommended.    Irregular diminished attenuation of the liver adjacent to the gallbladder fossa may relate to focal fatty sparing although is somewhat atypical, can be evaluated on ultrasound as well.    Diverticula of the colon, there is no evidence for acute diverticulitis.    Additional findings as above.    This report was flagged in Epic as abnormal.      Electronically signed by: Bean Jolley  Date:    11/18/2023  Time:    21:46               Narrative:    EXAMINATION:  CT ABDOMEN PELVIS WITH IV CONTRAST    CLINICAL HISTORY:  Lymphadenopathy, groin;    TECHNIQUE:  Low dose axial images, sagittal and coronal reformations were obtained from the lung bases to the pubic symphysis following the IV administration of 100 mL of Omnipaque 350 oral contrast was not utilized.  Single phase postcontrast CT examination of the abdomen and pelvis is submitted.    COMPARISON:  None.    FINDINGS:  The entirety of the dome of the right hemidiaphragm and including the entirety of the dome of the right lobe of the liver is not included on the field of view.  The visualized lung bases demonstrate mild atelectatic change.  The stomach appears decompressed, nonspecific appearance.    There is moderate to prominent gallbladder distention with heterogeneity of variable attenuation character within the gallbladder lumen, this may relate to sludge and cholelithiasis, although the possibility of an intraluminal soft tissue lesion cannot be  excluded, ultrasound follow-up is recommended.  There is no evidence for pericholecystic inflammatory change.  There is no peripancreatic inflammatory change there is no abnormal pancreatic or biliary ductal dilatation.    There are areas of diminished attenuation of the liver adjacent to the gallbladder fossa and these may relate to areas of focal fatty infiltrate however are somewhat atypical in appearance, can be evaluated sonographically as well.    There is no evidence for acute process of the spleen or adrenal glands.  The portal venous structures, superior mesenteric vein, splenic vein, superior mesenteric artery and celiac artery demonstrate appropriate opacification as does the aorta.  Mild aortic atherosclerotic change noted.  There is no aortic aneurysmal dilatation.    There is no evidence for hydronephrosis or ureteral calculus or obstructive uropathy or perinephric inflammatory change bilaterally.  The urinary bladder is incompletely distended, appearing unremarkable for degree of distention.  There is a 2.8 cm hypodensity of the right adnexa measuring approximately 18 Hounsfield units consistent with a prominent follicle.    There is no evidence for small bowel obstructive process.  The appendix is identified, it does not appear inflamed.  There are diverticula of the colon, there is no evidence for acute diverticulitis.  There is no evidence for free intraperitoneal air.    There is appearance of edema and inflammation at the level of the right groin and perineum, this appears to extend from the level of the inferior medial right groin, inferiorly along the right perineum, and posteriorly to the inferior aspect of the buttock.  This is seen on axial images 80 through 105.  There are several mildly prominent lymph nodes at the right groin, likely reactive.  In addition there is air density seen within the soft tissues along this distribution, as seen on axial images 89 through 100, correlation for any  laceration or instrumentation or other reason for air within the soft tissues is needed, in the absence of the aforementioned a gas producing infectious organism is to be considered.    Along the aforementioned distribution of air and inflammatory change, a well-formed well delineated walled off fluid collection is not seen, there are areas of greater edema or induration, particularly about the inferior right paramidline perineum and inferior buttock region, however a well-formed fluid collection is not appreciated.  There is no evidence for extension of the aforementioned abnormality above the pelvic floor.    The visualized osseous structures demonstrate chronic change.  This includes chronic changes of the spine, there are chronic changes at the sacroiliac joints including vacuum phenomena, and vacuum phenomena at the symphysis pubis.                                       X-Ray Chest AP Portable (Final result)  Result time 11/18/23 19:49:00      Final result by Zaida Venegas MD (11/18/23 19:49:00)                   Impression:      No acute cardiopulmonary process identified.      Electronically signed by: Zaida Venegas MD  Date:    11/18/2023  Time:    19:49               Narrative:    EXAMINATION:  XR CHEST AP PORTABLE    CLINICAL HISTORY:  Sepsis;    TECHNIQUE:  Single frontal view of the chest was performed.    COMPARISON:  01/03/2023.    FINDINGS:  Cardiac silhouette is normal in size.  Lungs are hypoinflated which accentuates pulmonary vascular markings.  No evidence of focal consolidative process, pneumothorax, or significant pleural effusion.  No acute osseous abnormality identified.                                       Medications   piperacillin-tazobactam (ZOSYN) 4.5 g in dextrose 5 % in water (D5W) 100 mL IVPB (MB+) (0 g Intravenous Stopped 11/18/23 2053)   vancomycin (VANCOCIN) 2,500 mg in dextrose 5 % (D5W) 500 mL IVPB (2,500 mg Intravenous Not Given 11/18/23 2000)   clindamycin in D5W 900  mg/50 mL IVPB 900 mg (900 mg Intravenous New Bag 11/18/23 2226)   sodium chloride 0.9% bolus 1,641 mL 1,641 mL (1,641 mLs Intravenous New Bag 11/18/23 2025)   morphine injection 8 mg (8 mg Intravenous Given 11/18/23 2146)   iohexoL (OMNIPAQUE 350) injection 100 mL (100 mLs Intravenous Given 11/18/23 2125)     Medical Decision Making  48-year-old female presenting to the emergency department with a chief complaint of groin pain.  States that she thinks she has boil.  States that she feels ill and has had subjective fever.  Motrin just prior to arrival.  On physical exam, she appears ill but is in no acute distress.  Mildly tachycardic, afebrile.  Area of significant tenderness to palpation, erythema, but without any discrete abscess, fluid collection, or skin breaks in the right side of the groin extending to the right buttock as described above.    Differential diagnosis includes but is not limited to abscess, cellulitis, Eduardo's gangrene, sepsis.    Patient was tachycardic, had a clear source of infectious process, and while afebrile head just taken antipyretics prior to arrival in the emergency department.  Sepsis workup was initiated.  Fluid bolus and empiric antibiotics for soft tissue infection were administered.  Labs limited due to facility, there was an error with the CMP instrument. CBC with mild leukocytosis of 10.6 K, predominantly granulocytic.  Able to get two creatinine values, one was 0.7 in the other was 0.5.  I-STAT lactate2.52.  PH 7.454.  CMP pending.  Blood cultures, urine cultures pending.  CT revealed a large area of edema and inflammation with soft tissue gas.  Some concern for Eduardo gangrene.  Discussed this case with Dr. Christianson, general surgery on-call physician.  She recommended adding clindamycin to the antibiotic regimen and admitting the patient to Medicine.  Surgery will act as a consulting team.  I also discussed this case with Dr. Jan Gilbert.  Patient will be admitted to   [facility] service.    This patient does have evidence of infective focus  My overall impression is sepsis.  Source: Skin and Soft Tissue (location right groin)  Antibiotics given- Antibiotics (72h ago, onward)    Start     Stop Route Frequency Ordered    11/18/23 2215  clindamycin in D5W 900 mg/50 mL IVPB 900 mg         11/19/23 1014 IV ED 1 Time 11/18/23 2210 11/18/23 2000  vancomycin (VANCOCIN) 2,500 mg in dextrose 5 % (D5W) 500   mL IVPB  (Sepsis Workup)         -- IV Once 11/18/23 1925 11/18/23 1930  piperacillin-tazobactam (ZOSYN) 4.5 g in dextrose 5 % in   water (D5W) 100 mL IVPB (MB+)  (Sepsis Workup)         11/19/23 1929 IV Every 8 hours (non-standard times) 11/18/23 1925      Latest lactate reviewed-  Lab             11/18/23 2020          POCLAC       2.52*         Organ dysfunction indicated by lactic acidosis, tachycardia, cellulitic skin changes    Fluid challenge Ideal Body Weight- The patient's ideal body weight is Ideal body weight: 54.7 kg (120 lb 9.5 oz) which will be used to calculate fluid bolus of 30 ml/kg for treatment of septic shock.      Post- resuscitation assessment Yes Perfusion exam was performed within 6 hours of septic shock presentation after bolus shows Adequate tissue perfusion assessed by non-invasive monitoring       Will Not start Pressors- Levophed for MAP of 65  Source control achieved by:  IV antibiotics.  Patient will also be evaluated by surgery, possible surgical debridement.      Amount and/or Complexity of Data Reviewed  Labs: ordered. Decision-making details documented in ED Course.  Radiology: ordered. Decision-making details documented in ED Course.    Risk  Prescription drug management.  Decision regarding hospitalization.  Diagnosis or treatment significantly limited by social determinants of health.            Scribe Attestation:   Scribe #1: I performed the above scribed service and the documentation accurately describes the services  I performed. I attest to the accuracy of the note.                          I, Hansel Winter PA-C, personally performed the services described in this documentation.  All medical record entries made by the scribe were at my direction and in my presence.  I have reviewed the chart and agree that the record reflects my personal performance and is accurate and complete.  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

## 2023-11-19 NOTE — PLAN OF CARE
Patient has been seen and examinated,patient past medical history of type 2 diabetes, hyperlipidemia and morbid obesity who presents with groin pain,CT show,  Edematous/inflammatory process involving the inferior medial right groin and perineum and extending posteriorly to the level of the inferior right buttock, there is appearance of edema and inflammation and areas of greater induration inferiorly, however well-formed fluid collection is not seen.Along the aforementioned distribution of abnormality there is air density within the soft tissues, correlation for any history of instrumentation or laceration to account for this is needed, in the absence of aforementioned the possibly of gas producing infectious organisms should be considered.   Surgery was consulted,patient had urgent I&D, and drain placement,on broad spectrum IV Abx,will follow up with cultures.

## 2023-11-19 NOTE — NURSING
[facility] [place]  Nurses Note -- 4 Eyes      11/19/2023       Skin assessed on: Q Shift      [x] No Pressure Injuries Present    []Prevention Measures Documented    [] Yes LDA  for Pressure Injury Previously documented     [] Yes New Pressure Injury Discovered   [] LDA for New Pressure Injury Added      Attending RN:  Jocelyn Smith RN     Second RN:  Mahsa Benton RN

## 2023-11-19 NOTE — ANESTHESIA PREPROCEDURE EVALUATION
11/19/2023  Ashanti Thompson is a 48 y.o., female.      Pre-op Assessment    I have reviewed the Patient Summary Reports.     I have reviewed the Nursing Notes.       Review of Systems  Anesthesia Hx:  No problems with previous Anesthesia             Denies Family Hx of Anesthesia complications.    Denies Personal Hx of Anesthesia complications.                    Social:  Non-Smoker       Hematology/Oncology:       -- Anemia:                                  Cardiovascular:  Exercise tolerance: good    Denies Hypertension.     Denies Dysrhythmias.   Denies Angina.     hyperlipidemia                             Pulmonary:  Pulmonary Normal                       Renal/:  Renal/ Normal                 Hepatic/GI:  Hepatic/GI Normal                 Musculoskeletal:     Right groin abscess            Neurological:  Neurology Normal                                      Endocrine:  Diabetes, poorly controlled               Physical Exam  General: Well nourished, Cooperative, Alert and Oriented    Airway:  Mallampati: II   Mouth Opening: Normal  TM Distance: 4 - 6 cm  Tongue: Normal  Neck ROM: Normal ROM    Dental:  Intact    Chest/Lungs:  Normal Respiratory Rate, Clear to auscultation    Heart:  Rate: Normal  Rhythm: Regular Rhythm      Wt Readings from Last 3 Encounters:   11/19/23 (!) 149.4 kg (329 lb 5.9 oz)   01/03/23 (!) 147.4 kg (325 lb)   03/17/22 (!) 155.5 kg (342 lb 12.8 oz)     Temp Readings from Last 3 Encounters:   11/19/23 36.7 °C (98.1 °F) (Oral)   01/03/23 36.9 °C (98.4 °F) (Oral)   03/17/22 36.7 °C (98 °F) (Skin)     BP Readings from Last 3 Encounters:   11/19/23 (!) 93/51   01/03/23 136/85   03/17/22 (!) 150/108     Pulse Readings from Last 3 Encounters:   11/19/23 86   01/03/23 81   03/17/22 90     Lab Results   Component Value Date    WBC 14.37 (H) 11/19/2023    HGB 11.7 (L) 11/19/2023    HCT  36.8 (L) 11/19/2023    MCV 83 11/19/2023     11/19/2023       CMP  Sodium   Date Value Ref Range Status   11/19/2023 133 (L) 136 - 145 mmol/L Final     Potassium   Date Value Ref Range Status   11/19/2023 3.6 3.5 - 5.1 mmol/L Final     Chloride   Date Value Ref Range Status   11/19/2023 101 95 - 110 mmol/L Final     CO2   Date Value Ref Range Status   11/19/2023 20 (L) 23 - 29 mmol/L Final     Glucose   Date Value Ref Range Status   11/19/2023 389 (H) 70 - 110 mg/dL Final     BUN   Date Value Ref Range Status   11/19/2023 9 6 - 20 mg/dL Final     Creatinine   Date Value Ref Range Status   11/19/2023 0.9 0.5 - 1.4 mg/dL Final     Calcium   Date Value Ref Range Status   11/19/2023 8.7 8.7 - 10.5 mg/dL Final     Total Protein   Date Value Ref Range Status   11/19/2023 6.8 6.0 - 8.4 g/dL Final     Albumin   Date Value Ref Range Status   11/19/2023 2.3 (L) 3.5 - 5.2 g/dL Final     Total Bilirubin   Date Value Ref Range Status   11/19/2023 1.7 (H) 0.1 - 1.0 mg/dL Final     Comment:     For infants and newborns, interpretation of results should be based  on gestational age, weight and in agreement with clinical  observations.    Premature Infant recommended reference ranges:  Up to 24 hours.............<8.0 mg/dL  Up to 48 hours............<12.0 mg/dL  3-5 days..................<15.0 mg/dL  6-29 days.................<15.0 mg/dL       Alkaline Phosphatase   Date Value Ref Range Status   11/19/2023 183 (H) 55 - 135 U/L Final     AST   Date Value Ref Range Status   11/19/2023 9 (L) 10 - 40 U/L Final     ALT   Date Value Ref Range Status   11/19/2023 11 10 - 44 U/L Final     Anion Gap   Date Value Ref Range Status   11/19/2023 12 8 - 16 mmol/L Final     eGFR   Date Value Ref Range Status   11/19/2023 >60 >60 mL/min/1.73 m^2 Final     11/19/2023 UPT negative    Anesthesia Plan  Type of Anesthesia, risks & benefits discussed:    Anesthesia Type: Gen ETT  Intra-op Monitoring Plan: Standard ASA Monitors  Post Op Pain  Control Plan: multimodal analgesia and IV/PO Opioids PRN  Induction:  IV  Informed Consent: Informed consent signed with the Patient and all parties understand the risks and agree with anesthesia plan.  All questions answered.   ASA Score: 3  Day of Surgery Review of History & Physical: H&P Update referred to the surgeon/provider.    Ready For Surgery From Anesthesia Perspective.     .

## 2023-11-19 NOTE — HPI
This is a 48-year-old female with a past medical history of type 2 diabetes, hyperlipidemia and morbid obesity who presents with groin pain.    Patient developed right groin swelling and pain that started about 3 days prior to presentation.  She denies noticing any discharge or pus.  Additional symptoms include fevers, chills and diarrhea.    In the ED, the patient was hemodynamically stable.  CT abdomen and pelvis showed (1) edematous/inflammatory process involving the inferior medial right groin and perineum and extending posteriorly to the level of the inferior right buttock, there is appearance of edema and inflammation and areas of greater induration inferiorly, (2) there is air density within the soft tissue (possibly of gas producing infectious organisms should be considered). Patient was given 1.6 L of NS, Zosyn, clindamycin, and morphine 8 mg IV.  Surgery was consulted & notified by ED provider.  She was admitted for further management.

## 2023-11-19 NOTE — CONSULTS
[facility] Surg  General Surgery  Consult Note    Inpatient consult to General Surgery  Consult performed by: Brie Christianson MD  Consult ordered by: Jan Gilbert MD        Subjective:     Chief Complaint/Reason for Admission: right groin cellulitits    History of Present Illness:   Patient is a 48y F w/ hx of morbid obesity, HTN, HLD, DMII who presents with a right groin infection. She reports the right groin began to show signs of infeciton about 3 days ago, and has slowly worsened over this time. She endorses unilateral swelling of her labia, induration, tenderness, but no drainage or breaks in the skin.  Imaging revealing air in the tissue. Consult to general surgery for STI.     No current facility-administered medications on file prior to encounter.     Current Outpatient Medications on File Prior to Encounter   Medication Sig    ACCU-CHEK BRIDGER PLUS METER Misc     azithromycin (Z-KORI) 250 MG tablet Take 1 tablet (250 mg total) by mouth once daily. 1 pack, take as directed    blood-glucose meter (FREESTYLE SYSTEM KIT) kit Of patients choice, [facility] brand    blood-glucose meter (FREESTYLE SYSTEM KIT) kit Use as directed    blood-glucose meter (FREESTYLE SYSTEM KIT) kit One kit as directed    BOOSTRIX TDAP 2.5-8-5 Lf-mcg-Lf/0.5mL Susp     clotrimazole (LOTRIMIN) 1 % cream Apply to affected area 2 times daily    ergocalciferol (ERGOCALCIFEROL) 50,000 unit Cap TAKE 1 CAPSULE BY MOUTH EVERY 7 DAYS    etodolac (LODINE) 400 MG tablet Take 1 tablet (400 mg total) by mouth 2 (two) times daily.    etodolac (LODINE) 400 MG tablet TAKE 1 TABLET BY MOUTH TWICE DAILY    fluticasone (FLONASE) 50 mcg/actuation nasal spray SHAKE LIQUID AND USE 1 SPRAY(50 MCG) IN EACH NOSTRIL EVERY DAY    FLUVIRIN 2204-2046 45 mcg (15 mcg x 3)/0.5 mL Susp     glipiZIDE (GLUCOTROL) 5 MG tablet Take 1 tablet (5 mg total) by mouth daily with breakfast.    glyBURIDE (DIABETA) 5 MG tablet     ibuprofen (ADVIL,MOTRIN) 600 MG tablet Take  1 tablet (600 mg total) by mouth every 8 (eight) hours as needed for Pain.    indomethacin (INDOCIN) 50 MG capsule TAKE 1 CAPSULE(50 MG) BY MOUTH TWICE DAILY WITH MEALS    ketoconazole (NIZORAL) 2 % cream Apply topically once daily.    LUTERA, 28, 0.1-20 mg-mcg per tablet     meloxicam (MOBIC) 15 MG tablet Po daily    metformin (GLUCOPHAGE) 500 MG tablet Take 1 tablet (500 mg total) by mouth once daily.    metFORMIN (GLUCOPHAGE) 500 MG tablet TAKE 1 TABLET BY MOUTH EVERY DAY WITH BREAKFAST    metFORMIN (GLUCOPHAGE) 500 MG tablet Take 1 tablet (500 mg total) by mouth daily with breakfast.    metFORMIN (GLUCOPHAGE) 500 MG tablet Take 1 tablet (500 mg total) by mouth 2 (two) times daily with meals. TAKE 1 TABLET(500 MG) BY MOUTH DAILY WITH BREAKFAST    pravastatin (PRAVACHOL) 40 MG tablet     TRADJENTA 5 mg Tab tablet TAKE 1 TABLET(5 MG) BY MOUTH EVERY DAY    TRUETEST TEST STRIPS Strp        Review of patient's allergies indicates:  No Known Allergies    Past Medical History:   Diagnosis Date    Diabetes mellitus     Hyperlipemia     Iron deficiency      Past Surgical History:   Procedure Laterality Date     SECTION       Family History       Problem Relation (Age of Onset)    Diabetes Father          Tobacco Use    Smoking status: Never    Smokeless tobacco: Never   Substance and Sexual Activity    Alcohol use: No     Alcohol/week: 0.0 standard drinks of alcohol    Drug use: No    Sexual activity: Not on file     Review of Systems   Constitutional:  Negative for fatigue and unexpected weight change.   HENT:  Negative for facial swelling.    Eyes:  Negative for redness.   Respiratory:  Negative for chest tightness and shortness of breath.    Cardiovascular:  Negative for chest pain and leg swelling.   Gastrointestinal:  Negative for abdominal pain, blood in stool, constipation, diarrhea and vomiting.   Neurological:  Negative for dizziness and headaches.     Objective:     Vital Signs (Most Recent):  Temp: 97.9  °F (36.6 °C) (11/19/23 1133)  Pulse: 90 (11/19/23 1133)  Resp: 16 (11/19/23 1133)  BP: (!) 95/56 (11/19/23 1133)  SpO2: (!) 92 % (11/19/23 1133) Vital Signs (24h Range):  Temp:  [97.7 °F (36.5 °C)-98.8 °F (37.1 °C)] 97.9 °F (36.6 °C)  Pulse:  [] 90  Resp:  [16-25] 16  SpO2:  [92 %-100 %] 92 %  BP: ()/(51-82) 95/56     Weight: (!) 149.4 kg (329 lb 5.9 oz)  Body mass index is 56.54 kg/m².      Intake/Output Summary (Last 24 hours) at 11/19/2023 1537  Last data filed at 11/19/2023 1306  Gross per 24 hour   Intake 3934.41 ml   Output 1385 ml   Net 2549.41 ml       Physical Exam  Constitutional:       General: She is not in acute distress.     Appearance: Normal appearance. She is obese.   HENT:      Head: Normocephalic and atraumatic.      Mouth/Throat:      Mouth: Mucous membranes are moist.   Eyes:      Pupils: Pupils are equal, round, and reactive to light.   Cardiovascular:      Rate and Rhythm: Normal rate.   Pulmonary:      Effort: Pulmonary effort is normal. No respiratory distress.   Abdominal:      General: Abdomen is flat. There is no distension.      Palpations: Abdomen is soft.   Genitourinary:     Comments: Right labia firm and indurated, much larger than left side. Tender. No breaks in skin, drainage, lesions.  Musculoskeletal:         General: Normal range of motion.      Cervical back: Normal range of motion.   Skin:     General: Skin is warm and dry.   Neurological:      General: No focal deficit present.      Mental Status: She is alert and oriented to person, place, and time.   Psychiatric:         Mood and Affect: Mood normal.         Behavior: Behavior normal.         Significant Labs:  All pertinent labs from the last 24 hours have been reviewed.    Significant Diagnostics:  I have reviewed all pertinent imaging results/findings within the past 24 hours.    Assessment/Plan:     Active Diagnoses:    Diagnosis Date Noted POA    PRINCIPAL PROBLEM:  Cellulitis of groin, right [L03.314]  11/19/2023 Yes    Sepsis [A41.9] 11/19/2023 Yes    Type 2 diabetes mellitus, without long-term current use of insulin [E11.9] 11/19/2023 Unknown    Morbid obesity [E66.01] 11/19/2023 Unknown      Problems Resolved During this Admission:     48y F w/ hx of right groin cellulitis consult to general surgery for debridement.     S/p debridement  Recommend broad spec abx  Fluid resuscitation  Dressing in place will be changed at bedside in 2 days   Multimodal pain control    Thank you for your consult. I will follow-up with patient. Please contact us if you have any additional questions.    Brie Christianson MD  General Surgery  [facility] Surg

## 2023-11-19 NOTE — ASSESSMENT & PLAN NOTE
"This patient does have evidence of infective focus  My overall impression is sepsis.  Source: Skin and Soft Tissue (location right groin)  Antibiotics given-   Antibiotics (72h ago, onward)      Start     Stop Route Frequency Ordered    11/19/23 0600  clindamycin in D5W 900 mg/50 mL IVPB 900 mg         -- IV Every 8 hours (non-standard times) 11/19/23 0130    11/19/23 0330  piperacillin-tazobactam (ZOSYN) 4.5 g in dextrose 5 % in water (D5W) 100 mL IVPB (MB+)         -- IV Every 8 hours (non-standard times) 11/19/23 0128    11/19/23 0228  vancomycin - pharmacy to dose  (vancomycin IVPB (PEDS and ADULTS))        See Hyperspace for full Linked Orders Report.    -- IV pharmacy to manage frequency 11/19/23 0128          Latest lactate reviewed-  No results for input(s): "LACTATE" in the last 72 hours.  Organ dysfunction indicated by  N/A    Fluid challenge Ideal Body Weight- The patient's ideal body weight is Ideal body weight: 54.7 kg (120 lb 9.5 oz) which will be used to calculate fluid bolus of 30 ml/kg for treatment of septic shock.      Post- resuscitation assessment Yes Perfusion exam was performed within 6 hours of septic shock presentation after bolus shows Adequate tissue perfusion assessed by non-invasive monitoring       Will Not start Pressors- Levophed for MAP of 65  Source control achieved by:   Continue Vanc/zosyn, clindamycin   "

## 2023-11-19 NOTE — OP NOTE
Operative Note     11/18/2023    PRE-OP DIAGNOSIS: Cellulitis of groin, right [L03.314]      POST-OP DIAGNOSIS: Post-Op Diagnosis Codes:     * Cellulitis of groin, right [L03.314]    Procedure(s):  DEBRIDEMENT, WOUND RIGHT GROINc     SURGEON: Surgeon(s) and Role:     * Vadim Prather MD - Primary    ANESTHESIA: Choice     OPERATIVE FINDINGS: Right groin infection w/ copious pus. No significant skin or fat necrosis.    INDICATION FOR PROCEDURE: This patient presents with a history of right groin infection with air concerning for possible NSTI.    PROCEDURE IN DETAIL:  Ashanti Thompson is a 48 y.o. female brought to the operating room for definitive surgery of right groin wound debridement. The patient was informed of the possible risks and complications of the procedure, including but not limited to anesthetic risks, bleeding, infection, and need for additional surgery.  The patient concurred with the proposed plan, and has given informed consent.  The site of surgery was properly noted/marked in the preoperative holding area.    The patient was then brought to the operating room and placed in the lithotomy position with both upper extremities extended.  general anesthesia was administered. Perioperative antibiotics were administered consisting of vancomycin and Zosyn and a time out was performed confirming the patient, site, and procedure.   The perineum was then prepped and draped in the usual sterile fashion.    We began by making an area at the mid perineum lateral to the labia at an area of fluctuance. We noted an immediate return of thick bloody pus from the area. We then extended our incision anteriorly and explored the area further. It appeared to track both anteriorly and posteriorly. We then made a counter incision over the second most indurated area that communicated with the inicial incision. The area was thoroughly washed out multiple times. Small amounts of soupy necrotic tissue were removed.  Hemostasis was achieved. A penrose drain was placed to connect our two incisions. The wounds were then packed with Kerlix gauze and a dry dressing was placed over this. The patient tolerated the procedure well.     ESTIMATED BLOOD LOSS: Minimal    COMPLICATIONS: none    DISPOSITION: PACU - hemodynamically stable.

## 2023-11-19 NOTE — H&P
[facility] Medicine  History & Physical    Patient Name: Ashanti Thompson  MRN: [id number]  Patient Class: IP- Inpatient  Admission Date: 2023  Attending Physician: Everette Nicholas MD   Primary Care Provider: Galdino Angulo MD         Patient information was obtained from patient and ER records.     Subjective:     Principal Problem:<principal problem not specified>    Chief Complaint:   Chief Complaint   Patient presents with    Abscess     A 47 y/o female presents to the ER c/o abscess to (right) groin crease x 3 days.         HPI: This is a 48-year-old female with a past medical history of type 2 diabetes, hyperlipidemia and morbid obesity who presents with groin pain.    Patient developed right groin swelling and pain that started about 3 days prior to presentation.  She denies noticing any discharge or pus.  Additional symptoms include fevers, chills and diarrhea.    In the ED, the patient was hemodynamically stable.  CT abdomen and pelvis showed (1) edematous/inflammatory process involving the inferior medial right groin and perineum and extending posteriorly to the level of the inferior right buttock, there is appearance of edema and inflammation and areas of greater induration inferiorly, (2) there is air density within the soft tissue (possibly of gas producing infectious organisms should be considered). Patient was given 1.6 L of NS, Zosyn, clindamycin, and morphine 8 mg IV.  Surgery was consulted & notified by ED provider.  She was admitted for further management.    Past Medical History:   Diagnosis Date    Diabetes mellitus     Hyperlipemia     Iron deficiency        Past Surgical History:   Procedure Laterality Date     SECTION         Review of patient's allergies indicates:  No Known Allergies    No current facility-administered medications on file prior to encounter.     Current Outpatient Medications on File Prior to Encounter   Medication Sig    ACCU-CHEK BRIDGER PLUS  METER Misc     azithromycin (Z-KORI) 250 MG tablet Take 1 tablet (250 mg total) by mouth once daily. 1 pack, take as directed    blood-glucose meter (FREESTYLE SYSTEM KIT) kit Of patients choice, [employer] brand    blood-glucose meter (FREESTYLE SYSTEM KIT) kit Use as directed    blood-glucose meter (FREESTYLE SYSTEM KIT) kit One kit as directed    BOOSTRIX TDAP 2.5-8-5 Lf-mcg-Lf/0.5mL Susp     clotrimazole (LOTRIMIN) 1 % cream Apply to affected area 2 times daily    ergocalciferol (ERGOCALCIFEROL) 50,000 unit Cap TAKE 1 CAPSULE BY MOUTH EVERY 7 DAYS    etodolac (LODINE) 400 MG tablet Take 1 tablet (400 mg total) by mouth 2 (two) times daily.    etodolac (LODINE) 400 MG tablet TAKE 1 TABLET BY MOUTH TWICE DAILY    fluticasone (FLONASE) 50 mcg/actuation nasal spray SHAKE LIQUID AND USE 1 SPRAY(50 MCG) IN EACH NOSTRIL EVERY DAY    FLUVIRIN 4328-5838 45 mcg (15 mcg x 3)/0.5 mL Susp     glipiZIDE (GLUCOTROL) 5 MG tablet Take 1 tablet (5 mg total) by mouth daily with breakfast.    glyBURIDE (DIABETA) 5 MG tablet     ibuprofen (ADVIL,MOTRIN) 600 MG tablet Take 1 tablet (600 mg total) by mouth every 8 (eight) hours as needed for Pain.    indomethacin (INDOCIN) 50 MG capsule TAKE 1 CAPSULE(50 MG) BY MOUTH TWICE DAILY WITH MEALS    ketoconazole (NIZORAL) 2 % cream Apply topically once daily.    LUTERA, 28, 0.1-20 mg-mcg per tablet     meloxicam (MOBIC) 15 MG tablet Po daily    metformin (GLUCOPHAGE) 500 MG tablet Take 1 tablet (500 mg total) by mouth once daily.    metFORMIN (GLUCOPHAGE) 500 MG tablet TAKE 1 TABLET BY MOUTH EVERY DAY WITH BREAKFAST    metFORMIN (GLUCOPHAGE) 500 MG tablet Take 1 tablet (500 mg total) by mouth daily with breakfast.    metFORMIN (GLUCOPHAGE) 500 MG tablet Take 1 tablet (500 mg total) by mouth 2 (two) times daily with meals. TAKE 1 TABLET(500 MG) BY MOUTH DAILY WITH BREAKFAST    pravastatin (PRAVACHOL) 40 MG tablet     TRADJENTA 5 mg Tab tablet TAKE 1 TABLET(5 MG) BY MOUTH EVERY DAY    TRUETEST  TEST STRIPS Strp      Family History       Problem Relation (Age of Onset)    Diabetes Father          Tobacco Use    Smoking status: Never    Smokeless tobacco: Never   Substance and Sexual Activity    Alcohol use: No     Alcohol/week: 0.0 standard drinks of alcohol    Drug use: No    Sexual activity: Not on file     Review of Systems   Constitutional: Negative.    HENT: Negative.     Eyes: Negative.    Respiratory: Negative.     Cardiovascular: Negative.    Gastrointestinal: Negative.    Endocrine: Negative.    Genitourinary: Negative.         R Groin swelling   Musculoskeletal: Negative.    Skin: Negative.    Allergic/Immunologic: Negative.    Neurological: Negative.    Psychiatric/Behavioral: Negative.       Objective:     Vital Signs (Most Recent):  Temp: 97.7 °F (36.5 °C) (11/19/23 0114)  Pulse: 93 (11/19/23 0114)  Resp: 19 (11/19/23 0021)  BP: (!) 108/58 (11/19/23 0120)  SpO2: 97 % (11/19/23 0114) Vital Signs (24h Range):  Temp:  [97.7 °F (36.5 °C)-98.8 °F (37.1 °C)] 97.7 °F (36.5 °C)  Pulse:  [] 93  Resp:  [16-25] 19  SpO2:  [96 %-98 %] 97 %  BP: ()/(56-71) 108/58     Weight: (!) 149.4 kg (329 lb 5.9 oz)  Body mass index is 56.54 kg/m².     Physical Exam  Vitals and nursing note reviewed.   Constitutional:       General: She is not in acute distress.     Appearance: Normal appearance. She is not ill-appearing.   HENT:      Head: Normocephalic and atraumatic.      Nose: Nose normal.      Mouth/Throat:      Mouth: Mucous membranes are moist.   Eyes:      Extraocular Movements: Extraocular movements intact.   Cardiovascular:      Rate and Rhythm: Normal rate.   Pulmonary:      Effort: Pulmonary effort is normal. No respiratory distress.   Abdominal:      General: Abdomen is flat.   Genitourinary:     Comments: Right groin swelling with associated erythema along the lower abdominal wall, extending to the right labia with associated localized tenderness.   Musculoskeletal:      Right lower leg: No  "edema.      Left lower leg: No edema.   Skin:     General: Skin is warm.      Capillary Refill: Capillary refill takes less than 2 seconds.   Neurological:      General: No focal deficit present.      Mental Status: She is alert.   Psychiatric:         Mood and Affect: Mood normal.           Chaperone: MIGUEL Bragg     Significant Labs: All pertinent labs within the past 24 hours have been reviewed.    Significant Imaging: I have reviewed all pertinent imaging results/findings within the past 24 hours.  Assessment/Plan:     * Cellulitis of groin, right  CT abdomen and pelvis showed (1) edematous/inflammatory process involving the inferior medial right groin and perineum and extending posteriorly to the level of the inferior right buttock, there is appearance of edema and inflammation and areas of greater induration inferiorly, (2) there is air density within the soft tissue (possibly of gas producing infectious organisms should be considered).   Concern for developing Eduardo's gangrene   Continue vancomycin, zosyn and clindamycin    Consult surgery       Sepsis  This patient does have evidence of infective focus  My overall impression is sepsis.  Source: Skin and Soft Tissue (location right groin)  Antibiotics given-   Antibiotics (72h ago, onward)      Start     Stop Route Frequency Ordered    11/19/23 0600  clindamycin in D5W 900 mg/50 mL IVPB 900 mg         -- IV Every 8 hours (non-standard times) 11/19/23 0130    11/19/23 0330  piperacillin-tazobactam (ZOSYN) 4.5 g in dextrose 5 % in water (D5W) 100 mL IVPB (MB+)         -- IV Every 8 hours (non-standard times) 11/19/23 0128    11/19/23 0228  vancomycin - pharmacy to dose  (vancomycin IVPB (PEDS and ADULTS))        See Hyperspace for full Linked Orders Report.    -- IV pharmacy to manage frequency 11/19/23 0128          Latest lactate reviewed-  No results for input(s): "LACTATE" in the last 72 hours.  Organ dysfunction indicated by  N/A    Fluid challenge Ideal " Body Weight- The patient's ideal body weight is Ideal body weight: 54.7 kg (120 lb 9.5 oz) which will be used to calculate fluid bolus of 30 ml/kg for treatment of septic shock.      Post- resuscitation assessment Yes Perfusion exam was performed within 6 hours of septic shock presentation after bolus shows Adequate tissue perfusion assessed by non-invasive monitoring       Will Not start Pressors- Levophed for MAP of 65  Source control achieved by:   Continue Vanc/zosyn, clindamycin     Morbid obesity  Body mass index is 56.54 kg/m². Morbid obesity complicates all aspects of disease management from diagnostic modalities to treatment. Weight loss encouraged and health benefits explained to patient.         Type 2 diabetes mellitus, without long-term current use of insulin  Glycemic protocol   LDSS        VTE Risk Mitigation (From admission, onward)           Ordered     heparin (porcine) injection 5,000 Units  Every 8 hours         11/19/23 0121     IP VTE HIGH RISK PATIENT  Once         11/19/23 0121     Place sequential compression device  Until discontinued         11/19/23 0121                               AdmissionCare    Guideline: Cellulitis - INPT, Inpatient    Based on the indications selected for the patient, the bed status of Admit to Inpatient was determined to be MET    The following indications were selected as present at the time of evaluation of the patient:      - Suspected necrotizing soft tissue infection (eg, gas in tissue). See Foot: Surgical Wound Care as appropriate.    AdmissionCare documentation entered by: YADY Terrell    [facility] [employer], 27th edition, Copyright © 2023 [facility] [employer], [employer] All Rights Reserved.  [id number]    Jan Gilbert MD  Department of Hospital Medicine  [facility] - Med Surg    N/A  Family History   Problem Relation Age of Onset    Diabetes Father      Pertinent information:

## 2023-11-19 NOTE — ASSESSMENT & PLAN NOTE
Body mass index is 56.54 kg/m². Morbid obesity complicates all aspects of disease management from diagnostic modalities to treatment. Weight loss encouraged and health benefits explained to patient.

## 2023-11-19 NOTE — TRANSFER OF CARE
"Anesthesia Transfer of Care Note    Patient: Ashanti Thompson    Procedure(s) Performed: Procedure(s) (LRB):  DEBRIDEMENT, WOUND RIGHT GROINc (Right)    Patient location: PACU    Anesthesia Type: general    Transport from OR: Transported from OR on room air with adequate spontaneous ventilation    Post pain: adequate analgesia    Post assessment: no apparent anesthetic complications and tolerated procedure well    Post vital signs: stable    Level of consciousness: sedated and responds to stimulation    Nausea/Vomiting: no nausea/vomiting    Complications: none    Transfer of care protocol was followed      Last vitals: Visit Vitals  /82 (BP Location: Right forearm)   Pulse 98   Temp 36.9 °C (98.4 °F) (Temporal)   Resp 17   Ht 5' 4" (1.626 m)   Wt (!) 149.4 kg (329 lb 5.9 oz)   LMP  (Approximate)   SpO2 100%   Breastfeeding No   BMI 56.54 kg/m²     "

## 2023-11-19 NOTE — PLAN OF CARE
NPO.  IV antibiotics as ordered. Ambulatory to BR, gait steady. Free of falls. No drainage from right groin abscess overnight. No complaints of N/V; no complaints of pain at rest. Continue with plan of care as ordered.  Problem: Adult Inpatient Plan of Care  Goal: Plan of Care Review  Outcome: Ongoing, Progressing  Goal: Patient-Specific Goal (Individualized)  Outcome: Ongoing, Progressing  Goal: Optimal Comfort and Wellbeing  Outcome: Ongoing, Progressing  Goal: Readiness for Transition of Care  Outcome: Ongoing, Progressing     Problem: Bariatric Environmental Safety  Goal: Safety Maintained with Care  Outcome: Ongoing, Progressing     Problem: Infection Progression (Sepsis/Septic Shock)  Goal: Absence of Infection Signs and Symptoms  Outcome: Ongoing, Progressing     Problem: Diabetes Comorbidity  Goal: Blood Glucose Level Within Targeted Range  Outcome: Ongoing, Progressing

## 2023-11-19 NOTE — NURSING
[facility]  Nurses Note -- 4 Eyes      11/19/2023       Skin assessed on: Admit      [x] No Pressure Injuries Present    []Prevention Measures Documented  Right groin/right labia cellulitis with swelling: general surgery consult    [] Yes LDA  for Pressure Injury Previously documented     [] Yes New Pressure Injury Discovered   [] LDA for New Pressure Injury Added      Attending RN:  Mahsa Benton, RN     Second RN:  Disha Broussard, PCA

## 2023-11-19 NOTE — BRIEF OP NOTE
[facility] Surg  Brief Operative Note    SUMMARY     Surgery Date: 11/19/2023     Surgeon(s) and Role:     * Vadim Prather MD - Primary    Assisting Surgeon: Brie Christianson MD - Resident    Pre-op Diagnosis:  Cellulitis of groin, right [L03.314]    Post-op Diagnosis:  Post-Op Diagnosis Codes:     * Cellulitis of groin, right [L03.314]    Procedure(s) (LRB):  DEBRIDEMENT, WOUND RIGHT GROINc (Right)    Anesthesia: Choice    Implants:  * No implants in log *    Operative Findings: s/p debridement of right groin wound. Purulent output. No findings consistent with necrotizing soft tissue infection. Copious amounts of pus evacuated, area washed out. Counter incision made and drain placed.     Estimated Blood Loss: 50 mL    Estimated Blood Loss has been documented.         Specimens:   Specimen (24h ago, onward)      None            [id number]

## 2023-11-19 NOTE — ANESTHESIA POSTPROCEDURE EVALUATION
Anesthesia Post Evaluation    Patient: Ashanti Thompson    Procedure(s) Performed: Procedure(s) (LRB):  DEBRIDEMENT, WOUND RIGHT GROINc (Right)    Final Anesthesia Type: general      Patient location during evaluation: PACU  Patient participation: Yes- Able to Participate  Level of consciousness: awake and alert  Post-procedure vital signs: reviewed and stable  Pain management: adequate  Airway patency: patent    PONV status at discharge: No PONV  Anesthetic complications: no      Cardiovascular status: hemodynamically stable  Respiratory status: unassisted and spontaneous ventilation  Hydration status: euvolemic  Follow-up not needed.          Vitals Value Taken Time   /74 11/19/23 1032   Temp 36.9 °C (98.4 °F) 11/19/23 0945   Pulse 94 11/19/23 1045   Resp 17 11/19/23 1045   SpO2 95 % 11/19/23 1045   Vitals shown include unvalidated device data.      No case tracking events are documented in the log.      Pain/Enrike Score: Pain Rating Prior to Med Admin: 6 (11/19/2023 10:04 AM)  Pain Rating Post Med Admin: 3 (11/19/2023 10:15 AM)  Enrike Score: 10 (11/19/2023 10:15 AM)

## 2023-11-19 NOTE — SUBJECTIVE & OBJECTIVE
Past Medical History:   Diagnosis Date    Diabetes mellitus     Hyperlipemia     Iron deficiency        Past Surgical History:   Procedure Laterality Date     SECTION         Review of patient's allergies indicates:  No Known Allergies    No current facility-administered medications on file prior to encounter.     Current Outpatient Medications on File Prior to Encounter   Medication Sig    ACCU-CHEK BRIDGER PLUS METER Misc     azithromycin (Z-KORI) 250 MG tablet Take 1 tablet (250 mg total) by mouth once daily. 1 pack, take as directed    blood-glucose meter (FREESTYLE SYSTEM KIT) kit Of patients choice, dainamonique brand    blood-glucose meter (FREESTYLE SYSTEM KIT) kit Use as directed    blood-glucose meter (FREESTYLE SYSTEM KIT) kit One kit as directed    BOOSTRIX TDAP 2.5-8-5 Lf-mcg-Lf/0.5mL Susp     clotrimazole (LOTRIMIN) 1 % cream Apply to affected area 2 times daily    ergocalciferol (ERGOCALCIFEROL) 50,000 unit Cap TAKE 1 CAPSULE BY MOUTH EVERY 7 DAYS    etodolac (LODINE) 400 MG tablet Take 1 tablet (400 mg total) by mouth 2 (two) times daily.    etodolac (LODINE) 400 MG tablet TAKE 1 TABLET BY MOUTH TWICE DAILY    fluticasone (FLONASE) 50 mcg/actuation nasal spray SHAKE LIQUID AND USE 1 SPRAY(50 MCG) IN EACH NOSTRIL EVERY DAY    FLUVIRIN [id number] 45 mcg (15 mcg x 3)/0.5 mL Susp     glipiZIDE (GLUCOTROL) 5 MG tablet Take 1 tablet (5 mg total) by mouth daily with breakfast.    glyBURIDE (DIABETA) 5 MG tablet     ibuprofen (ADVIL,MOTRIN) 600 MG tablet Take 1 tablet (600 mg total) by mouth every 8 (eight) hours as needed for Pain.    indomethacin (INDOCIN) 50 MG capsule TAKE 1 CAPSULE(50 MG) BY MOUTH TWICE DAILY WITH MEALS    ketoconazole (NIZORAL) 2 % cream Apply topically once daily.    LUTERA, 28, 0.1-20 mg-mcg per tablet     meloxicam (MOBIC) 15 MG tablet Po daily    metformin (GLUCOPHAGE) 500 MG tablet Take 1 tablet (500 mg total) by mouth once daily.    metFORMIN (GLUCOPHAGE) 500 MG tablet TAKE 1  TABLET BY MOUTH EVERY DAY WITH BREAKFAST    metFORMIN (GLUCOPHAGE) 500 MG tablet Take 1 tablet (500 mg total) by mouth daily with breakfast.    metFORMIN (GLUCOPHAGE) 500 MG tablet Take 1 tablet (500 mg total) by mouth 2 (two) times daily with meals. TAKE 1 TABLET(500 MG) BY MOUTH DAILY WITH BREAKFAST    pravastatin (PRAVACHOL) 40 MG tablet     TRADJENTA 5 mg Tab tablet TAKE 1 TABLET(5 MG) BY MOUTH EVERY DAY    TRUETEST TEST STRIPS Strp      Family History       Problem Relation (Age of Onset)    Diabetes Father          Tobacco Use    Smoking status: Never    Smokeless tobacco: Never   Substance and Sexual Activity    Alcohol use: No     Alcohol/week: 0.0 standard drinks of alcohol    Drug use: No    Sexual activity: Not on file     Review of Systems   Constitutional: Negative.    HENT: Negative.     Eyes: Negative.    Respiratory: Negative.     Cardiovascular: Negative.    Gastrointestinal: Negative.    Endocrine: Negative.    Genitourinary: Negative.         R Groin swelling   Musculoskeletal: Negative.    Skin: Negative.    Allergic/Immunologic: Negative.    Neurological: Negative.    Psychiatric/Behavioral: Negative.       Objective:     Vital Signs (Most Recent):  Temp: 97.7 °F (36.5 °C) (11/19/23 0114)  Pulse: 93 (11/19/23 0114)  Resp: 19 (11/19/23 0021)  BP: (!) 108/58 (11/19/23 0120)  SpO2: 97 % (11/19/23 0114) Vital Signs (24h Range):  Temp:  [97.7 °F (36.5 °C)-98.8 °F (37.1 °C)] 97.7 °F (36.5 °C)  Pulse:  [] 93  Resp:  [16-25] 19  SpO2:  [96 %-98 %] 97 %  BP: ()/(56-71) 108/58     Weight: (!) 149.4 kg (329 lb 5.9 oz)  Body mass index is 56.54 kg/m².     Physical Exam  Vitals and nursing note reviewed.   Constitutional:       General: She is not in acute distress.     Appearance: Normal appearance. She is not ill-appearing.   HENT:      Head: Normocephalic and atraumatic.      Nose: Nose normal.      Mouth/Throat:      Mouth: Mucous membranes are moist.   Eyes:      Extraocular Movements:  Extraocular movements intact.   Cardiovascular:      Rate and Rhythm: Normal rate.   Pulmonary:      Effort: Pulmonary effort is normal. No respiratory distress.   Abdominal:      General: Abdomen is flat.   Genitourinary:     Comments: Right groin swelling with associated erythema along the lower abdominal wall, extending to the right labia with associated localized tenderness.   Musculoskeletal:      Right lower leg: No edema.      Left lower leg: No edema.   Skin:     General: Skin is warm.      Capillary Refill: Capillary refill takes less than 2 seconds.   Neurological:      General: No focal deficit present.      Mental Status: She is alert.   Psychiatric:         Mood and Affect: Mood normal.           Chaperone: MIGUEL rBagg     Significant Labs: All pertinent labs within the past 24 hours have been reviewed.    Significant Imaging: I have reviewed all pertinent imaging results/findings within the past 24 hours.

## 2023-11-19 NOTE — ADMISSIONCARE
AdmissionCare    Guideline: Cellulitis - INPT, Inpatient    Based on the indications selected for the patient, the bed status of Admit to Inpatient was determined to be MET    The following indications were selected as present at the time of evaluation of the patient:      - Suspected necrotizing soft tissue infection (eg, gas in tissue). See Foot: Surgical Wound Care as appropriate.    AdmissionCare documentation entered by: YADY Terrell    [facility], 27th edition, Copyright © 2023 [facility], [facility] All Rights Reserved.  [id number]

## 2023-11-19 NOTE — NURSING
Pt leaving unit to surgery. Pt in no acute distress. x2 patent IV's. Pt received surgical bath. Fluids and antibiotics going.

## 2023-11-20 PROBLEM — L02.214 ABSCESS OF RIGHT GROIN: Status: ACTIVE | Noted: 2023-11-19

## 2023-11-20 LAB
ANION GAP SERPL CALC-SCNC: 9 MMOL/L (ref 8–16)
BASOPHILS # BLD AUTO: 0.02 K/UL (ref 0–0.2)
BASOPHILS NFR BLD: 0.3 % (ref 0–1.9)
BUN SERPL-MCNC: 13 MG/DL (ref 6–20)
CALCIUM SERPL-MCNC: 7.9 MG/DL (ref 8.7–10.5)
CHLORIDE SERPL-SCNC: 102 MMOL/L (ref 95–110)
CO2 SERPL-SCNC: 18 MMOL/L (ref 23–29)
CREAT SERPL-MCNC: 1 MG/DL (ref 0.5–1.4)
DIFFERENTIAL METHOD: ABNORMAL
EOSINOPHIL # BLD AUTO: 0 K/UL (ref 0–0.5)
EOSINOPHIL NFR BLD: 0.6 % (ref 0–8)
ERYTHROCYTE [DISTWIDTH] IN BLOOD BY AUTOMATED COUNT: 13.6 % (ref 11.5–14.5)
EST. GFR  (NO RACE VARIABLE): >60 ML/MIN/1.73 M^2
GLUCOSE SERPL-MCNC: 347 MG/DL (ref 70–110)
HCT VFR BLD AUTO: 30.8 % (ref 37–48.5)
HGB BLD-MCNC: 9.8 G/DL (ref 12–16)
IMM GRANULOCYTES # BLD AUTO: 0.05 K/UL (ref 0–0.04)
IMM GRANULOCYTES NFR BLD AUTO: 0.7 % (ref 0–0.5)
LYMPHOCYTES # BLD AUTO: 1 K/UL (ref 1–4.8)
LYMPHOCYTES NFR BLD: 15.4 % (ref 18–48)
MCH RBC QN AUTO: 25.8 PG (ref 27–31)
MCHC RBC AUTO-ENTMCNC: 31.8 G/DL (ref 32–36)
MCV RBC AUTO: 81 FL (ref 82–98)
MONOCYTES # BLD AUTO: 0.4 K/UL (ref 0.3–1)
MONOCYTES NFR BLD: 5.2 % (ref 4–15)
NEUTROPHILS # BLD AUTO: 5.2 K/UL (ref 1.8–7.7)
NEUTROPHILS NFR BLD: 77.8 % (ref 38–73)
NRBC BLD-RTO: 0 /100 WBC
PLATELET # BLD AUTO: 257 K/UL (ref 150–450)
PMV BLD AUTO: 11.6 FL (ref 9.2–12.9)
POCT GLUCOSE: 248 MG/DL (ref 70–110)
POCT GLUCOSE: 300 MG/DL (ref 70–110)
POCT GLUCOSE: 315 MG/DL (ref 70–110)
POCT GLUCOSE: 321 MG/DL (ref 70–110)
POTASSIUM SERPL-SCNC: 3.2 MMOL/L (ref 3.5–5.1)
RBC # BLD AUTO: 3.8 M/UL (ref 4–5.4)
SODIUM SERPL-SCNC: 129 MMOL/L (ref 136–145)
VANCOMYCIN TROUGH SERPL-MCNC: 28.2 UG/ML (ref 10–22)
WBC # BLD AUTO: 6.74 K/UL (ref 3.9–12.7)

## 2023-11-20 PROCEDURE — 11000001 HC ACUTE MED/SURG PRIVATE ROOM

## 2023-11-20 PROCEDURE — 85025 COMPLETE CBC W/AUTO DIFF WBC: CPT | Performed by: HOSPITALIST

## 2023-11-20 PROCEDURE — 25000003 PHARM REV CODE 250: Performed by: STUDENT IN AN ORGANIZED HEALTH CARE EDUCATION/TRAINING PROGRAM

## 2023-11-20 PROCEDURE — 80202 ASSAY OF VANCOMYCIN: CPT | Performed by: STUDENT IN AN ORGANIZED HEALTH CARE EDUCATION/TRAINING PROGRAM

## 2023-11-20 PROCEDURE — 36415 COLL VENOUS BLD VENIPUNCTURE: CPT | Performed by: HOSPITALIST

## 2023-11-20 PROCEDURE — 63600175 PHARM REV CODE 636 W HCPCS: Performed by: HOSPITALIST

## 2023-11-20 PROCEDURE — 25000003 PHARM REV CODE 250: Performed by: HOSPITALIST

## 2023-11-20 PROCEDURE — 80048 BASIC METABOLIC PNL TOTAL CA: CPT | Performed by: HOSPITALIST

## 2023-11-20 PROCEDURE — 63600175 PHARM REV CODE 636 W HCPCS: Performed by: STUDENT IN AN ORGANIZED HEALTH CARE EDUCATION/TRAINING PROGRAM

## 2023-11-20 PROCEDURE — 36415 COLL VENOUS BLD VENIPUNCTURE: CPT | Performed by: STUDENT IN AN ORGANIZED HEALTH CARE EDUCATION/TRAINING PROGRAM

## 2023-11-20 RX ORDER — INSULIN ASPART 100 [IU]/ML
10 INJECTION, SOLUTION INTRAVENOUS; SUBCUTANEOUS
Status: DISCONTINUED | OUTPATIENT
Start: 2023-11-20 | End: 2023-11-23

## 2023-11-20 RX ORDER — POTASSIUM CHLORIDE 20 MEQ/1
40 TABLET, EXTENDED RELEASE ORAL ONCE
Status: COMPLETED | OUTPATIENT
Start: 2023-11-20 | End: 2023-11-20

## 2023-11-20 RX ADMIN — HEPARIN SODIUM 5000 UNITS: 5000 INJECTION INTRAVENOUS; SUBCUTANEOUS at 10:11

## 2023-11-20 RX ADMIN — INSULIN ASPART 4 UNITS: 100 INJECTION, SOLUTION INTRAVENOUS; SUBCUTANEOUS at 11:11

## 2023-11-20 RX ADMIN — VANCOMYCIN HYDROCHLORIDE 2000 MG: 500 INJECTION, POWDER, LYOPHILIZED, FOR SOLUTION INTRAVENOUS at 02:11

## 2023-11-20 RX ADMIN — MUPIROCIN: 20 OINTMENT TOPICAL at 08:11

## 2023-11-20 RX ADMIN — HEPARIN SODIUM 5000 UNITS: 5000 INJECTION INTRAVENOUS; SUBCUTANEOUS at 01:11

## 2023-11-20 RX ADMIN — Medication 6 MG: at 10:11

## 2023-11-20 RX ADMIN — POTASSIUM CHLORIDE 40 MEQ: 1500 TABLET, EXTENDED RELEASE ORAL at 08:11

## 2023-11-20 RX ADMIN — PIPERACILLIN AND TAZOBACTAM 4.5 G: 4; .5 INJECTION, POWDER, LYOPHILIZED, FOR SOLUTION INTRAVENOUS; PARENTERAL at 10:11

## 2023-11-20 RX ADMIN — METHOCARBAMOL 500 MG: 500 TABLET ORAL at 08:11

## 2023-11-20 RX ADMIN — PIPERACILLIN AND TAZOBACTAM 4.5 G: 4; .5 INJECTION, POWDER, LYOPHILIZED, FOR SOLUTION INTRAVENOUS; PARENTERAL at 12:11

## 2023-11-20 RX ADMIN — GABAPENTIN 300 MG: 300 CAPSULE ORAL at 03:11

## 2023-11-20 RX ADMIN — INSULIN ASPART 1 UNITS: 100 INJECTION, SOLUTION INTRAVENOUS; SUBCUTANEOUS at 10:11

## 2023-11-20 RX ADMIN — INSULIN DETEMIR 30 UNITS: 100 INJECTION, SOLUTION SUBCUTANEOUS at 08:11

## 2023-11-20 RX ADMIN — METHOCARBAMOL 500 MG: 500 TABLET ORAL at 04:11

## 2023-11-20 RX ADMIN — INSULIN ASPART 3 UNITS: 100 INJECTION, SOLUTION INTRAVENOUS; SUBCUTANEOUS at 08:11

## 2023-11-20 RX ADMIN — CLINDAMYCIN PHOSPHATE 900 MG: 900 INJECTION, SOLUTION INTRAVENOUS at 11:11

## 2023-11-20 RX ADMIN — METHOCARBAMOL 500 MG: 500 TABLET ORAL at 10:11

## 2023-11-20 RX ADMIN — PIPERACILLIN AND TAZOBACTAM 4.5 G: 4; .5 INJECTION, POWDER, LYOPHILIZED, FOR SOLUTION INTRAVENOUS; PARENTERAL at 05:11

## 2023-11-20 RX ADMIN — INSULIN ASPART 10 UNITS: 100 INJECTION, SOLUTION INTRAVENOUS; SUBCUTANEOUS at 11:11

## 2023-11-20 RX ADMIN — CLINDAMYCIN PHOSPHATE 900 MG: 900 INJECTION, SOLUTION INTRAVENOUS at 07:11

## 2023-11-20 RX ADMIN — OXYCODONE HYDROCHLORIDE 5 MG: 5 TABLET ORAL at 11:11

## 2023-11-20 RX ADMIN — CLINDAMYCIN PHOSPHATE 900 MG: 900 INJECTION, SOLUTION INTRAVENOUS at 03:11

## 2023-11-20 RX ADMIN — ONDANSETRON 4 MG: 2 INJECTION INTRAMUSCULAR; INTRAVENOUS at 11:11

## 2023-11-20 RX ADMIN — INSULIN ASPART 10 UNITS: 100 INJECTION, SOLUTION INTRAVENOUS; SUBCUTANEOUS at 04:11

## 2023-11-20 RX ADMIN — GABAPENTIN 300 MG: 300 CAPSULE ORAL at 10:11

## 2023-11-20 RX ADMIN — MUPIROCIN: 20 OINTMENT TOPICAL at 10:11

## 2023-11-20 RX ADMIN — INSULIN ASPART 4 UNITS: 100 INJECTION, SOLUTION INTRAVENOUS; SUBCUTANEOUS at 04:11

## 2023-11-20 RX ADMIN — POLYETHYLENE GLYCOL 3350 17 G: 17 POWDER, FOR SOLUTION ORAL at 08:11

## 2023-11-20 RX ADMIN — METHOCARBAMOL 500 MG: 500 TABLET ORAL at 12:11

## 2023-11-20 RX ADMIN — GABAPENTIN 300 MG: 300 CAPSULE ORAL at 08:11

## 2023-11-20 RX ADMIN — ACETAMINOPHEN 1000 MG: 500 TABLET ORAL at 01:11

## 2023-11-20 RX ADMIN — HEPARIN SODIUM 5000 UNITS: 5000 INJECTION INTRAVENOUS; SUBCUTANEOUS at 05:11

## 2023-11-20 RX ADMIN — ACETAMINOPHEN 1000 MG: 500 TABLET ORAL at 10:11

## 2023-11-20 NOTE — NURSING
[facility] [place]  Nurses Note -- 4 Eyes      11/19/2023       Skin assessed on: Q Shift      [x] No Pressure Injuries Present    []Prevention Measures Documented    [] Yes LDA  for Pressure Injury Previously documented     [] Yes New Pressure Injury Discovered   [] LDA for New Pressure Injury Added      Attending RN:  Mahsa Benton RN     Second RN:  Lynsey Smith RN

## 2023-11-20 NOTE — PLAN OF CARE
Case Management Assessment     PCP: Galdino Angulo MD  Pharmacy: [facility] [address]     Patient Arrived From: Home  Existing Help at Home: Sister     Barriers to Discharge: none    Discharge Plan:    A. Home   B. Home with family    Patient independent from home and lives with her three dependent children. Pt stated her sister will be her support at discharge. No current services or dme. Gen surgery following, drain placed, continue IV Abx,pending cultures.CM to continue to follow for dc needs.      11/20/23 1156   Discharge Assessment   Assessment Type Discharge Planning Assessment   Confirmed/corrected address, phone number and insurance Yes   Confirmed Demographics Correct on Facesheet   Source of Information patient;family;health record   Communicated FORREST with patient/caregiver Date not available/Unable to determine   People in Home child(guido), dependent   Do you expect to return to your current living situation? Yes   Do you have help at home or someone to help you manage your care at home? Yes   Prior to hospitilization cognitive status: Alert/Oriented   Current cognitive status: Alert/Oriented   Equipment Currently Used at Home none   Readmission within 30 days? No   Patient currently being followed by outpatient case management? No   Do you currently have service(s) that help you manage your care at home? No   Do you take prescription medications? Yes   Do you have prescription coverage? Yes   Do you have any problems affording any of your prescribed medications? TBD   Is the patient taking medications as prescribed? yes   How do you get to doctors appointments? family or friend will provide;car, drives self   Are you on dialysis? No   Do you take coumadin? No   DME Needed Upon Discharge  other (see comments)  (TBD)   Discharge Plan discussed with: Patient   Transition of Care Barriers None

## 2023-11-20 NOTE — PLAN OF CARE
Problem: Adult Inpatient Plan of Care  Goal: Plan of Care Review  Outcome: Ongoing, Progressing  Goal: Patient-Specific Goal (Individualized)  Outcome: Ongoing, Progressing  Goal: Optimal Comfort and Wellbeing  Outcome: Ongoing, Progressing  Goal: Readiness for Transition of Care  Outcome: Ongoing, Progressing     Problem: Bariatric Environmental Safety  Goal: Safety Maintained with Care  Outcome: Ongoing, Progressing     Problem: Infection Progression (Sepsis/Septic Shock)  Goal: Absence of Infection Signs and Symptoms  Outcome: Ongoing, Progressing     Problem: Diabetes Comorbidity  Goal: Blood Glucose Level Within Targeted Range  Outcome: Ongoing, Progressing

## 2023-11-20 NOTE — PROGRESS NOTES
Pharmacokinetic Assessment Follow Up: IV Vancomycin    Vancomycin serum concentration assessment(s):    The trough level was drawn correctly and can be used to guide therapy at this time. The measurement is above the desired definitive target range of 10 to 20 mcg/mL.    Vancomycin Regimen Plan:    Discontinue the scheduled vancomycin regimen and re-dose when the random level is less than 20 mcg/mL, next level to be drawn at 0400 on 11/21/23.    Drug levels (last 3 results):  Recent Labs   Lab Result Units 11/20/23  1313   Vancomycin-Trough ug/mL 28.2*       Pharmacy will continue to follow and monitor vancomycin.    Please contact pharmacy at extension [phone] for questions regarding this assessment.    Thank you for the consult,   Sharonda Abbott       Patient brief summary:  Ashanti Thompson is a 48 y.o. female initiated on antimicrobial therapy with IV Vancomycin for treatment of skin & soft tissue infection      Drug Allergies:   Review of patient's allergies indicates:  No Known Allergies    Actual Body Weight:   149.4 kg     Renal Function:   Estimated Creatinine Clearance: 99.5 mL/min (based on SCr of 1 mg/dL).,     Dialysis Method (if applicable):  N/A    CBC (last 72 hours):  Recent Labs   Lab Result Units 11/19/23  0426 11/20/23  0337   WBC K/uL 14.37* 6.74   Hemoglobin g/dL 11.7* 9.8*   Hematocrit % 36.8* 30.8*   Platelets K/uL 280 257   Gran % % 84.9* 77.8*   Lymph % % 6.9* 15.4*   Mono % % 7.4 5.2   Eosinophil % % 0.0 0.6   Basophil % % 0.3 0.3   Differential Method  Automated Automated       Metabolic Panel (last 72 hours):  Recent Labs   Lab Result Units 11/18/23 2103 11/19/23  0357 11/19/23  0426 11/20/23  0337   Sodium mmol/L 135*  --  133* 129*   Potassium mmol/L 3.4*  --  3.6 3.2*   Chloride mmol/L 100  --  101 102   CO2 mmol/L 22*  --  20* 18*   Glucose mg/dL 356*  --  389* 347*   Glucose, UA   --  4+*  --   --    BUN mg/dL 9  --  9 13   Creatinine mg/dL 0.9  --  0.9 1.0   Albumin g/dL 2.5*  --  2.3*   --    Total Bilirubin mg/dL 1.8*  --  1.7*  --    Alkaline Phosphatase U/L 174*  --  183*  --    AST U/L 11  --  9*  --    ALT U/L 11  --  11  --    Magnesium mg/dL  --   --  1.6  --    Phosphorus mg/dL  --   --  3.5  --        Vancomycin Administrations:  vancomycin given in the last 96 hours                     vancomycin (VANCOCIN) 2,000 mg in dextrose 5 % (D5W) 500 mL IVPB (mg) 2,000 mg New Bag 11/20/23 0227     2,000 mg New Bag 11/19/23 1439    vancomycin (VANCOCIN) 2,500 mg in dextrose 5 % (D5W) 500 mL IVPB (mg) 2,500 mg New Bag 11/19/23 0147                    Microbiologic Results:  Microbiology Results (last 7 days)       Procedure Component Value Units Date/Time    Aerobic culture [[id number]]  (Abnormal) Collected: 11/19/23 0913    Order Status: Completed Specimen: Incision site from Groin Updated: 11/20/23 0850     Aerobic Bacterial Culture STREPTOCOCCUS AGALACTIAE (GROUP B)  Moderate  Beta-hemolytic streptococci are routinely susceptible to   penicillins,cephalosporins and carbapenems.      Narrative:      Right Groin    Urine culture [[id number]] Collected: 11/19/23 0355    Order Status: Completed Specimen: Urine Updated: 11/20/23 0842     Urine Culture, Routine No growth to date    Narrative:      Indicated criteria for high risk culture:->Other  Other (specify):->Sepsis    Blood culture x two cultures. Draw prior to antibiotics. [[id number]] Collected: 11/18/23 2005    Order Status: Completed Specimen: Blood from Peripheral, Hand, Right Updated: 11/20/23 0303     Blood Culture, Routine No Growth to date      No Growth to date    Narrative:      Aerobic and anaerobic    Blood culture x two cultures. Draw prior to antibiotics. [[id number]] Collected: 11/18/23 1945    Order Status: Completed Specimen: Blood from Peripheral, Antecubital, Right Updated: 11/20/23 0303     Blood Culture, Routine No Growth to date      No Growth to date    Narrative:      Aerobic and anaerobic    Culture, Anaerobe  [[id number]] Collected: 11/19/23 0913    Order Status: Sent Specimen: Incision site from Groin Updated: 11/19/23 1002

## 2023-11-20 NOTE — NURSING
[facility]  Nurses Note -- 4 Eyes      11/20/2023       Skin assessed on: Q Shift      [x] No Pressure Injuries Present    [x]Prevention Measures Documented    [] Yes LDA  for Pressure Injury Previously documented     [] Yes New Pressure Injury Discovered   [] LDA for New Pressure Injury Added      Attending RN:  Clare Andrade RN     Second RN:  MIGUEL Bragg

## 2023-11-20 NOTE — PROGRESS NOTES
[facility]  General Surgery  Progress Note    Subjective:     History of Present Illness:  No notes on file    Post-Op Info:  Procedure(s) (LRB):  DEBRIDEMENT, WOUND RIGHT GROINc (Right)   1 Day Post-Op     Interval History:   NAEON  Feeling okay this morning  Pain at incision sites  Dressing CDI    Medications:  Continuous Infusions:  Scheduled Meds:   acetaminophen  1,000 mg Oral Q8H    clindamycin (CLEOCIN) IVPB  900 mg Intravenous Q8H    gabapentin  300 mg Oral TID    heparin (porcine)  5,000 Units Subcutaneous Q8H    insulin aspart U-100  10 Units Subcutaneous TIDWM    insulin detemir U-100  30 Units Subcutaneous Daily    methocarbamoL  500 mg Oral QID    mupirocin   Nasal BID    piperacillin-tazobactam (Zosyn) IV (PEDS and ADULTS) (extended infusion is not appropriate)  4.5 g Intravenous Q8H    polyethylene glycol  17 g Oral Daily    vancomycin (VANCOCIN) IV (PEDS and ADULTS)  2,000 mg Intravenous Q12H     PRN Meds:albuterol-ipratropium, aluminum-magnesium hydroxide-simethicone, bisacodyL, dextrose 10%, dextrose 10%, glucagon (human recombinant), glucose, glucose, HYDROcodone-acetaminophen, insulin aspart U-100, magnesium oxide, magnesium oxide, melatonin, naloxone, ondansetron, oxyCODONE, oxyCODONE, potassium bicarbonate, potassium bicarbonate, potassium bicarbonate, potassium, sodium phosphates, potassium, sodium phosphates, potassium, sodium phosphates, prochlorperazine, simethicone, sodium chloride 0.9%, Pharmacy to dose Vancomycin consult **AND** vancomycin - pharmacy to dose     Review of patient's allergies indicates:  No Known Allergies  Objective:     Vital Signs (Most Recent):  Temp: 99 °F (37.2 °C) (11/20/23 1125)  Pulse: 87 (11/20/23 1125)  Resp: 17 (11/20/23 1125)  BP: 130/72 (11/20/23 1125)  SpO2: 95 % (11/20/23 1125) Vital Signs (24h Range):  Temp:  [98.1 °F (36.7 °C)-99.4 °F (37.4 °C)] 99 °F (37.2 °C)  Pulse:  [85-97] 87  Resp:  [16-18] 17  SpO2:  [93 %-96 %] 95 %  BP:  ()/(54-72) 130/72     Weight: (!) 149.4 kg (329 lb 5.9 oz)  Body mass index is 56.54 kg/m².    Intake/Output - Last 3 Shifts         11/18 0700  11/19 0659 11/19 0700  11/20 0659 11/20 0700  11/21 0659    P.O. 0 360 240    I.V. (mL/kg) 250 (1.7) 1832 (12.3)     IV Piggyback 2249 1855.3     Total Intake(mL/kg) 2499 (16.7) 4047.3 (27.1) 240 (1.6)    Urine (mL/kg/hr) 500 2735 (0.8)     Stool  0     Blood  50     Total Output 500 2785     Net +1999 +1262.3 +240           Stool Occurrence  0 x              Physical Exam  Constitutional:       General: She is not in acute distress.     Appearance: Normal appearance.   HENT:      Head: Normocephalic and atraumatic.      Mouth/Throat:      Mouth: Mucous membranes are moist.   Eyes:      Pupils: Pupils are equal, round, and reactive to light.   Cardiovascular:      Rate and Rhythm: Normal rate.   Pulmonary:      Effort: Pulmonary effort is normal. No respiratory distress.   Abdominal:      General: Abdomen is flat. There is no distension.      Palpations: Abdomen is soft.   Musculoskeletal:         General: Normal range of motion.      Cervical back: Normal range of motion.      Comments: Right groin dressing in place, clean and dry   Skin:     General: Skin is warm and dry.   Neurological:      General: No focal deficit present.      Mental Status: She is alert and oriented to person, place, and time.   Psychiatric:         Mood and Affect: Mood normal.         Behavior: Behavior normal.          Significant Labs:  I have reviewed all pertinent lab results within the past 24 hours.    Significant Diagnostics:  I have reviewed all pertinent imaging results/findings within the past 24 hours.  Assessment/Plan:     * Abscess of right groin  Patient is a 48 y F who is s/p right groin debridement on 11/19.     Recommend wound care consult to help with wound management long term  De-escalate antibiotics per culture results  We will plan to change the dressing tomorrow at  bedside    Rest of care per primary         Brie Christianson MD  General Surgery  [facility]

## 2023-11-20 NOTE — PLAN OF CARE
Problem: Adult Inpatient Plan of Care  Goal: Plan of Care Review  Outcome: Ongoing, Progressing  Goal: Patient-Specific Goal (Individualized)  Outcome: Ongoing, Progressing  Goal: Absence of Hospital-Acquired Illness or Injury  Outcome: Ongoing, Progressing  Goal: Optimal Comfort and Wellbeing  Outcome: Ongoing, Progressing  Goal: Readiness for Transition of Care  Outcome: Ongoing, Progressing     Problem: Bariatric Environmental Safety  Goal: Safety Maintained with Care  Outcome: Ongoing, Progressing     Problem: Adjustment to Illness (Sepsis/Septic Shock)  Goal: Optimal Coping  Outcome: Ongoing, Progressing     Problem: Bleeding (Sepsis/Septic Shock)  Goal: Absence of Bleeding  Outcome: Ongoing, Progressing     Problem: Glycemic Control Impaired (Sepsis/Septic Shock)  Goal: Blood Glucose Level Within Desired Range  Outcome: Ongoing, Progressing     Problem: Infection Progression (Sepsis/Septic Shock)  Goal: Absence of Infection Signs and Symptoms  Outcome: Ongoing, Progressing     Problem: Nutrition Impaired (Sepsis/Septic Shock)  Goal: Optimal Nutrition Intake  Outcome: Ongoing, Progressing     Problem: Diabetes Comorbidity  Goal: Blood Glucose Level Within Targeted Range  Outcome: Ongoing, Progressing

## 2023-11-20 NOTE — HOSPITAL COURSE
49 y/o female with past medical history of type 2 diabetes, hyperlipidemia and morbid obesity who presents with groin pain.  CT showing   Edematous/inflammatory process involving the inferior medial right groin and perineum and extending posteriorly to the level of the inferior right buttock, there is appearance of edema and inflammation and areas of greater induration inferiorly.  Along the aforementioned distribution of abnormality there is air density within the soft tissues possibly of gas producing infectious organisms should be considered.   Surgery was consulted and patient had urgent I&D and drain placement.  Started on broad spectrum ABX's.  Wound Care consulted.  DAVIDE and Vanc stopped.  Started on IVF's.  ID consulted for ABX recommendations.  Wound cultures growing group B Strep.  ID recommending to continue Rocephin while inpatient and PO Cefadroxil for one week post discharge.    She has remained afebrile and hemodynamically stable.  Creat peaked and has now continued to improve.  Adequate urine output.  Drain removed by Surgery.  Patient will need continued wound care.  She will be discharged home with  wound care to follow up with PCP and Surgery.

## 2023-11-20 NOTE — SUBJECTIVE & OBJECTIVE
Past Medical History:   Diagnosis Date    Diabetes mellitus     Hyperlipemia     Iron deficiency        Past Surgical History:   Procedure Laterality Date     SECTION         Review of patient's allergies indicates:  No Known Allergies    No current facility-administered medications on file prior to encounter.     Current Outpatient Medications on File Prior to Encounter   Medication Sig    ACCU-CHEK BRIDGER PLUS METER Misc     azithromycin (Z-KORI) 250 MG tablet Take 1 tablet (250 mg total) by mouth once daily. 1 pack, take as directed    blood-glucose meter (FREESTYLE SYSTEM KIT) kit Of patients choice, [employer] brand    blood-glucose meter (FREESTYLE SYSTEM KIT) kit Use as directed    blood-glucose meter (FREESTYLE SYSTEM KIT) kit One kit as directed    BOOSTRIX TDAP 2.5-8-5 Lf-mcg-Lf/0.5mL Susp     clotrimazole (LOTRIMIN) 1 % cream Apply to affected area 2 times daily    ergocalciferol (ERGOCALCIFEROL) 50,000 unit Cap TAKE 1 CAPSULE BY MOUTH EVERY 7 DAYS    etodolac (LODINE) 400 MG tablet Take 1 tablet (400 mg total) by mouth 2 (two) times daily.    etodolac (LODINE) 400 MG tablet TAKE 1 TABLET BY MOUTH TWICE DAILY    fluticasone (FLONASE) 50 mcg/actuation nasal spray SHAKE LIQUID AND USE 1 SPRAY(50 MCG) IN EACH NOSTRIL EVERY DAY    FLUVIRIN [id number] 45 mcg (15 mcg x 3)/0.5 mL Susp     glipiZIDE (GLUCOTROL) 5 MG tablet Take 1 tablet (5 mg total) by mouth daily with breakfast.    glyBURIDE (DIABETA) 5 MG tablet     ibuprofen (ADVIL,MOTRIN) 600 MG tablet Take 1 tablet (600 mg total) by mouth every 8 (eight) hours as needed for Pain.    indomethacin (INDOCIN) 50 MG capsule TAKE 1 CAPSULE(50 MG) BY MOUTH TWICE DAILY WITH MEALS    ketoconazole (NIZORAL) 2 % cream Apply topically once daily.    LUTERA, 28, 0.1-20 mg-mcg per tablet     meloxicam (MOBIC) 15 MG tablet Po daily    metformin (GLUCOPHAGE) 500 MG tablet Take 1 tablet (500 mg total) by mouth once daily.    metFORMIN (GLUCOPHAGE) 500 MG tablet TAKE 1  TABLET BY MOUTH EVERY DAY WITH BREAKFAST    metFORMIN (GLUCOPHAGE) 500 MG tablet Take 1 tablet (500 mg total) by mouth daily with breakfast.    metFORMIN (GLUCOPHAGE) 500 MG tablet Take 1 tablet (500 mg total) by mouth 2 (two) times daily with meals. TAKE 1 TABLET(500 MG) BY MOUTH DAILY WITH BREAKFAST    pravastatin (PRAVACHOL) 40 MG tablet     TRADJENTA 5 mg Tab tablet TAKE 1 TABLET(5 MG) BY MOUTH EVERY DAY    TRUETEST TEST STRIPS Strp      Family History       Problem Relation (Age of Onset)    Diabetes Father          Tobacco Use    Smoking status: Never    Smokeless tobacco: Never   Substance and Sexual Activity    Alcohol use: No     Alcohol/week: 0.0 standard drinks of alcohol    Drug use: No    Sexual activity: Not on file     Review of Systems   Constitutional: Negative.    HENT: Negative.     Eyes: Negative.    Respiratory: Negative.     Cardiovascular: Negative.    Gastrointestinal: Negative.    Endocrine: Negative.    Genitourinary: Negative.         R Groin swelling   Musculoskeletal: Negative.    Skin: Negative.    Allergic/Immunologic: Negative.    Neurological: Negative.    Psychiatric/Behavioral: Negative.       Objective:     Vital Signs (Most Recent):  Temp: 99 °F (37.2 °C) (11/20/23 1125)  Pulse: 87 (11/20/23 1125)  Resp: 17 (11/20/23 1125)  BP: 130/72 (11/20/23 1125)  SpO2: 95 % (11/20/23 1125) Vital Signs (24h Range):  Temp:  [98.1 °F (36.7 °C)-99.4 °F (37.4 °C)] 99 °F (37.2 °C)  Pulse:  [85-97] 87  Resp:  [16-18] 17  SpO2:  [93 %-96 %] 95 %  BP: ()/(54-72) 130/72     Weight: (!) 149.4 kg (329 lb 5.9 oz)  Body mass index is 56.54 kg/m².     Physical Exam  Vitals and nursing note reviewed.   Constitutional:       General: She is not in acute distress.     Appearance: Normal appearance. She is not ill-appearing.   HENT:      Head: Normocephalic and atraumatic.      Nose: Nose normal.      Mouth/Throat:      Mouth: Mucous membranes are moist.   Eyes:      Extraocular Movements: Extraocular  movements intact.   Cardiovascular:      Rate and Rhythm: Normal rate.   Pulmonary:      Effort: Pulmonary effort is normal. No respiratory distress.   Abdominal:      General: Abdomen is flat.   Genitourinary:     Comments: Right groin swelling with associated erythema along the lower abdominal wall, extending to the right labia with associated localized tenderness.   Musculoskeletal:      Right lower leg: No edema.      Left lower leg: No edema.   Skin:     General: Skin is warm.      Capillary Refill: Capillary refill takes less than 2 seconds.   Neurological:      General: No focal deficit present.      Mental Status: She is alert.   Psychiatric:         Mood and Affect: Mood normal.           Chaperone: MIGUEL Bragg     Significant Labs: All pertinent labs within the past 24 hours have been reviewed.    Significant Imaging: I have reviewed all pertinent imaging results/findings within the past 24 hours.

## 2023-11-20 NOTE — SUBJECTIVE & OBJECTIVE
Interval History:   NAEON  Feeling okay this morning  Pain at incision sites  Dressing CDI    Medications:  Continuous Infusions:  Scheduled Meds:   acetaminophen  1,000 mg Oral Q8H    clindamycin (CLEOCIN) IVPB  900 mg Intravenous Q8H    gabapentin  300 mg Oral TID    heparin (porcine)  5,000 Units Subcutaneous Q8H    insulin aspart U-100  10 Units Subcutaneous TIDWM    insulin detemir U-100  30 Units Subcutaneous Daily    methocarbamoL  500 mg Oral QID    mupirocin   Nasal BID    piperacillin-tazobactam (Zosyn) IV (PEDS and ADULTS) (extended infusion is not appropriate)  4.5 g Intravenous Q8H    polyethylene glycol  17 g Oral Daily    vancomycin (VANCOCIN) IV (PEDS and ADULTS)  2,000 mg Intravenous Q12H     PRN Meds:albuterol-ipratropium, aluminum-magnesium hydroxide-simethicone, bisacodyL, dextrose 10%, dextrose 10%, glucagon (human recombinant), glucose, glucose, HYDROcodone-acetaminophen, insulin aspart U-100, magnesium oxide, magnesium oxide, melatonin, naloxone, ondansetron, oxyCODONE, oxyCODONE, potassium bicarbonate, potassium bicarbonate, potassium bicarbonate, potassium, sodium phosphates, potassium, sodium phosphates, potassium, sodium phosphates, prochlorperazine, simethicone, sodium chloride 0.9%, Pharmacy to dose Vancomycin consult **AND** vancomycin - pharmacy to dose     Review of patient's allergies indicates:  No Known Allergies  Objective:     Vital Signs (Most Recent):  Temp: 99 °F (37.2 °C) (11/20/23 1125)  Pulse: 87 (11/20/23 1125)  Resp: 17 (11/20/23 1125)  BP: 130/72 (11/20/23 1125)  SpO2: 95 % (11/20/23 1125) Vital Signs (24h Range):  Temp:  [98.1 °F (36.7 °C)-99.4 °F (37.4 °C)] 99 °F (37.2 °C)  Pulse:  [85-97] 87  Resp:  [16-18] 17  SpO2:  [93 %-96 %] 95 %  BP: ()/(54-72) 130/72     Weight: (!) 149.4 kg (329 lb 5.9 oz)  Body mass index is 56.54 kg/m².    Intake/Output - Last 3 Shifts         11/18 0700 11/19 0659 11/19 0700 11/20 0659 11/20 0700 11/21 0659    P.O. 0 360 240     I.V. (mL/kg) 250 (1.7) 1832 (12.3)     IV Piggyback 2249 1855.3     Total Intake(mL/kg) 2499 (16.7) 4047.3 (27.1) 240 (1.6)    Urine (mL/kg/hr) 500 2735 (0.8)     Stool  0     Blood  50     Total Output 500 2785     Net +1999 +1262.3 +240           Stool Occurrence  0 x              Physical Exam  Constitutional:       General: She is not in acute distress.     Appearance: Normal appearance.   HENT:      Head: Normocephalic and atraumatic.      Mouth/Throat:      Mouth: Mucous membranes are moist.   Eyes:      Pupils: Pupils are equal, round, and reactive to light.   Cardiovascular:      Rate and Rhythm: Normal rate.   Pulmonary:      Effort: Pulmonary effort is normal. No respiratory distress.   Abdominal:      General: Abdomen is flat. There is no distension.      Palpations: Abdomen is soft.   Musculoskeletal:         General: Normal range of motion.      Cervical back: Normal range of motion.      Comments: Right groin dressing in place, clean and dry   Skin:     General: Skin is warm and dry.   Neurological:      General: No focal deficit present.      Mental Status: She is alert and oriented to person, place, and time.   Psychiatric:         Mood and Affect: Mood normal.         Behavior: Behavior normal.          Significant Labs:  I have reviewed all pertinent lab results within the past 24 hours.    Significant Diagnostics:  I have reviewed all pertinent imaging results/findings within the past 24 hours.

## 2023-11-20 NOTE — ASSESSMENT & PLAN NOTE
CT abdomen and pelvis showed (1) edematous/inflammatory process involving the inferior medial right groin and perineum and extending posteriorly to the level of the inferior right buttock, there is appearance of edema and inflammation and areas of greater induration inferiorly, (2) there is air density within the soft tissue (possibly of gas producing infectious organisms should be considered).   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  S/P I&D and debridement by surgery,culture growing strep.

## 2023-11-20 NOTE — PROGRESS NOTES
[facility] Medicine  Progress Note    Patient Name: Ashanti Thompson  MRN: [id number]  Patient Class: IP- Inpatient   Admission Date: 11/18/2023  Length of Stay: 2 days  Attending Physician: Erin Ford, *  Primary Care Provider: Galdino Angulo MD        Subjective:     Principal Problem:Abscess of right groin        HPI:  This is a 48-year-old female with a past medical history of type 2 diabetes, hyperlipidemia and morbid obesity who presents with groin pain.    Patient developed right groin swelling and pain that started about 3 days prior to presentation.  She denies noticing any discharge or pus.  Additional symptoms include fevers, chills and diarrhea.    In the ED, the patient was hemodynamically stable.  CT abdomen and pelvis showed (1) edematous/inflammatory process involving the inferior medial right groin and perineum and extending posteriorly to the level of the inferior right buttock, there is appearance of edema and inflammation and areas of greater induration inferiorly, (2) there is air density within the soft tissue (possibly of gas producing infectious organisms should be considered). Patient was given 1.6 L of NS, Zosyn, clindamycin, and morphine 8 mg IV.  Surgery was consulted & notified by ED provider.  She was admitted for further management.    Overview/Hospital Course:  patient past medical history of type 2 diabetes, hyperlipidemia and morbid obesity who presents with groin pain,CT show,  Edematous/inflammatory process involving the inferior medial right groin and perineum and extending posteriorly to the level of the inferior right buttock, there is appearance of edema and inflammation and areas of greater induration inferiorly, however well-formed fluid collection is not seen.Along the aforementioned distribution of abnormality there is air density within the soft tissues, correlation for any history of instrumentation or laceration to account for this is  needed, in the absence of aforementioned the possibly of gas producing infectious organisms should be considered.   Surgery was consulted,patient had urgent I&D, and drain placement,on broad spectrum IV Abx,will follow up with cultures.  Culture growing strep.    Past Medical History:   Diagnosis Date    Diabetes mellitus     Hyperlipemia     Iron deficiency        Past Surgical History:   Procedure Laterality Date     SECTION         Review of patient's allergies indicates:  No Known Allergies    No current facility-administered medications on file prior to encounter.     Current Outpatient Medications on File Prior to Encounter   Medication Sig    ACCU-CHEK BRIDGER PLUS METER Misc     azithromycin (Z-KORI) 250 MG tablet Take 1 tablet (250 mg total) by mouth once daily. 1 pack, take as directed    blood-glucose meter (FREESTYLE SYSTEM KIT) kit Of patients choice, [employer] brand    blood-glucose meter (FREESTYLE SYSTEM KIT) kit Use as directed    blood-glucose meter (FREESTYLE SYSTEM KIT) kit One kit as directed    BOOSTRIX TDAP 2.5-8-5 Lf-mcg-Lf/0.5mL Susp     clotrimazole (LOTRIMIN) 1 % cream Apply to affected area 2 times daily    ergocalciferol (ERGOCALCIFEROL) 50,000 unit Cap TAKE 1 CAPSULE BY MOUTH EVERY 7 DAYS    etodolac (LODINE) 400 MG tablet Take 1 tablet (400 mg total) by mouth 2 (two) times daily.    etodolac (LODINE) 400 MG tablet TAKE 1 TABLET BY MOUTH TWICE DAILY    fluticasone (FLONASE) 50 mcg/actuation nasal spray SHAKE LIQUID AND USE 1 SPRAY(50 MCG) IN EACH NOSTRIL EVERY DAY    FLUVIRIN 6033-0072 45 mcg (15 mcg x 3)/0.5 mL Susp     glipiZIDE (GLUCOTROL) 5 MG tablet Take 1 tablet (5 mg total) by mouth daily with breakfast.    glyBURIDE (DIABETA) 5 MG tablet     ibuprofen (ADVIL,MOTRIN) 600 MG tablet Take 1 tablet (600 mg total) by mouth every 8 (eight) hours as needed for Pain.    indomethacin (INDOCIN) 50 MG capsule TAKE 1 CAPSULE(50 MG) BY MOUTH TWICE DAILY WITH MEALS    ketoconazole  (NIZORAL) 2 % cream Apply topically once daily.    LUTERA, 28, 0.1-20 mg-mcg per tablet     meloxicam (MOBIC) 15 MG tablet Po daily    metformin (GLUCOPHAGE) 500 MG tablet Take 1 tablet (500 mg total) by mouth once daily.    metFORMIN (GLUCOPHAGE) 500 MG tablet TAKE 1 TABLET BY MOUTH EVERY DAY WITH BREAKFAST    metFORMIN (GLUCOPHAGE) 500 MG tablet Take 1 tablet (500 mg total) by mouth daily with breakfast.    metFORMIN (GLUCOPHAGE) 500 MG tablet Take 1 tablet (500 mg total) by mouth 2 (two) times daily with meals. TAKE 1 TABLET(500 MG) BY MOUTH DAILY WITH BREAKFAST    pravastatin (PRAVACHOL) 40 MG tablet     TRADJENTA 5 mg Tab tablet TAKE 1 TABLET(5 MG) BY MOUTH EVERY DAY    TRUETEST TEST STRIPS Strp      Family History       Problem Relation (Age of Onset)    Diabetes Father          Tobacco Use    Smoking status: Never    Smokeless tobacco: Never   Substance and Sexual Activity    Alcohol use: No     Alcohol/week: 0.0 standard drinks of alcohol    Drug use: No    Sexual activity: Not on file     Review of Systems   Constitutional: Negative.    HENT: Negative.     Eyes: Negative.    Respiratory: Negative.     Cardiovascular: Negative.    Gastrointestinal: Negative.    Endocrine: Negative.    Genitourinary: Negative.         R Groin swelling   Musculoskeletal: Negative.    Skin: Negative.    Allergic/Immunologic: Negative.    Neurological: Negative.    Psychiatric/Behavioral: Negative.       Objective:     Vital Signs (Most Recent):  Temp: 99 °F (37.2 °C) (11/20/23 1125)  Pulse: 87 (11/20/23 1125)  Resp: 17 (11/20/23 1125)  BP: 130/72 (11/20/23 1125)  SpO2: 95 % (11/20/23 1125) Vital Signs (24h Range):  Temp:  [98.1 °F (36.7 °C)-99.4 °F (37.4 °C)] 99 °F (37.2 °C)  Pulse:  [85-97] 87  Resp:  [16-18] 17  SpO2:  [93 %-96 %] 95 %  BP: ()/(54-72) 130/72     Weight: (!) 149.4 kg (329 lb 5.9 oz)  Body mass index is 56.54 kg/m².     Physical Exam  Vitals and nursing note reviewed.   Constitutional:       General: She  is not in acute distress.     Appearance: Normal appearance. She is not ill-appearing.   HENT:      Head: Normocephalic and atraumatic.      Nose: Nose normal.      Mouth/Throat:      Mouth: Mucous membranes are moist.   Eyes:      Extraocular Movements: Extraocular movements intact.   Cardiovascular:      Rate and Rhythm: Normal rate.   Pulmonary:      Effort: Pulmonary effort is normal. No respiratory distress.   Abdominal:      General: Abdomen is flat.   Genitourinary:     Comments: Right groin swelling with associated erythema along the lower abdominal wall, extending to the right labia with associated localized tenderness.   Musculoskeletal:      Right lower leg: No edema.      Left lower leg: No edema.   Skin:     General: Skin is warm.      Capillary Refill: Capillary refill takes less than 2 seconds.   Neurological:      General: No focal deficit present.      Mental Status: She is alert.   Psychiatric:         Mood and Affect: Mood normal.           Chaperone: MIGUEL Bragg     Significant Labs: All pertinent labs within the past 24 hours have been reviewed.    Significant Imaging: I have reviewed all pertinent imaging results/findings within the past 24 hours.    Assessment/Plan:      * Abscess of right groin  CT abdomen and pelvis showed (1) edematous/inflammatory process involving the inferior medial right groin and perineum and extending posteriorly to the level of the inferior right buttock, there is appearance of edema and inflammation and areas of greater induration inferiorly, (2) there is air density within the soft tissue (possibly of gas producing infectious organisms should be considered).   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  S/P I&D and debridement by surgery,culture growing strep.      Morbid obesity  Body mass index is 56.54 kg/m². Morbid obesity complicates all aspects of disease management from diagnostic modalities to  "treatment. Weight loss encouraged and health benefits explained to patient.         Type 2 diabetes mellitus, without long-term current use of insulin  Glycemic protocol   LDSS      Sepsis  This patient does have evidence of infective focus  My overall impression is sepsis.  Source: Skin and Soft Tissue (location right groin)  Antibiotics given-   Antibiotics (72h ago, onward)      Start     Stop Route Frequency Ordered    11/19/23 0600  clindamycin in D5W 900 mg/50 mL IVPB 900 mg         -- IV Every 8 hours (non-standard times) 11/19/23 0130    11/19/23 0330  piperacillin-tazobactam (ZOSYN) 4.5 g in dextrose 5 % in water (D5W) 100 mL IVPB (MB+)         -- IV Every 8 hours (non-standard times) 11/19/23 0128    11/19/23 0228  vancomycin - pharmacy to dose  (vancomycin IVPB (PEDS and ADULTS))        See Hyperspace for full Linked Orders Report.    -- IV pharmacy to manage frequency 11/19/23 0128          Latest lactate reviewed-  No results for input(s): "LACTATE" in the last 72 hours.  Organ dysfunction indicated by  N/A    Fluid challenge Ideal Body Weight- The patient's ideal body weight is Ideal body weight: 54.7 kg (120 lb 9.5 oz) which will be used to calculate fluid bolus of 30 ml/kg for treatment of septic shock.      Post- resuscitation assessment Yes Perfusion exam was performed within 6 hours of septic shock presentation after bolus shows Adequate tissue perfusion assessed by non-invasive monitoring       Will Not start Pressors- Levophed for MAP of 65  Source control achieved by:   Continue Vanc/zosyn, clindamycin       VTE Risk Mitigation (From admission, onward)           Ordered     heparin (porcine) injection 5,000 Units  Every 8 hours         11/19/23 0121     IP VTE HIGH RISK PATIENT  Once         11/19/23 0121     Place sequential compression device  Until discontinued         11/19/23 0121                    Discharge Planning   FORREST:      Code Status: Full Code   Is the patient medically ready for " discharge?:     Reason for patient still in hospital (select all that apply): Patient trending condition                     Erin Ford MD  Department of Hospital Medicine   [facility] Surg

## 2023-11-20 NOTE — PT/OT/SLP PROGRESS
Occupational Therapy      Patient Name:  Ashanti Thompson   MRN:  [id number]    Patient not seen today secondary to: pt found in the bed and able to be awoken by OT easily; however, with brief communication to pt, pt falling back asleep/difficult to stay awake. Mother at bedside. Pt just received pain medication. D/w nurse. OT d/w pt, mother, and then nurse that if pt becomes more alert this evening, pt is to start implementing progressive mobility >chair/BSC with nursing staff. OT will follow-up later as able.     11/20/2023

## 2023-11-20 NOTE — CONSULTS
[facility] Surg  Wound Care    Patient Name:  Ashanti Thompson   MRN:  [id number]  Date: 11/20/2023  Diagnosis: Abscess of right groin    History:     Past Medical History:   Diagnosis Date    Diabetes mellitus     Hyperlipemia     Iron deficiency        Social History     Socioeconomic History    Marital status: Single   Tobacco Use    Smoking status: Never    Smokeless tobacco: Never   Substance and Sexual Activity    Alcohol use: No     Alcohol/week: 0.0 standard drinks of alcohol    Drug use: No       Precautions:     Allergies as of 11/18/2023    (No Known Allergies)       [facility] Assessment Details/Treatment   Consulted for right groin wound management  A 48 year old female admitted 11/18/23 from home with cellulitis of right groin; sepsis; morbid obesity; DM II  11/20 WBC 6.74 Hgb 9.8 Hct 30.8  11/19 Alb 2.3 Weight 329 lbs  3/17/22 A1C 9.5  On Isoflex mattress; Abdullahi score 19  11/19 S/P Debridement of right groin per Dr. Prather for cellulitis of right groin  Plan for dressing change tomorrow at bedside  Plan: Will assess wound and make recommendations for wound care when discharged home.  1630 Visited patient to check location of wound to prepare for bedside dressing changes.  ABD pad covering packing and mesh pants saturated with serosanguineous drainage without foul odor.   Due to saturation, removed ABD pad and mesh pants and cleansed around incision and right groin with Vashe. Dried area and applied 3M Cavilon No Sting Film Barrier to periwound skin. Mons pubis and right upper thigh edematous and indurated. Painful to touch. Covered with ABD pad and applied dry mesh pants. Patient turning side to side with minimal assistance.    11/20/2023

## 2023-11-20 NOTE — ASSESSMENT & PLAN NOTE
Patient is a 48 y F who is s/p right groin debridement on 11/19.     Recommend wound care consult to help with wound management long term  De-escalate antibiotics per culture results  We will plan to change the dressing tomorrow at bedside    Rest of care per primary

## 2023-11-20 NOTE — PLAN OF CARE
Problem: Infection Progression (Sepsis/Septic Shock)  Goal: Absence of Infection Signs and Symptoms  Outcome: Ongoing, Progressing     Problem: Nutrition Impaired (Sepsis/Septic Shock)  Goal: Optimal Nutrition Intake  Outcome: Ongoing, Progressing     Problem: Diabetes Comorbidity  Goal: Blood Glucose Level Within Targeted Range  Outcome: Ongoing, Progressing

## 2023-11-21 PROBLEM — N17.9 AKI (ACUTE KIDNEY INJURY): Status: ACTIVE | Noted: 2023-11-21

## 2023-11-21 LAB
ANION GAP SERPL CALC-SCNC: 13 MMOL/L (ref 8–16)
ANISOCYTOSIS BLD QL SMEAR: SLIGHT
BACTERIA UR CULT: NORMAL
BASOPHILS # BLD AUTO: ABNORMAL K/UL (ref 0–0.2)
BASOPHILS NFR BLD: 0 % (ref 0–1.9)
BUN SERPL-MCNC: 21 MG/DL (ref 6–20)
CALCIUM SERPL-MCNC: 8.9 MG/DL (ref 8.7–10.5)
CHLORIDE SERPL-SCNC: 102 MMOL/L (ref 95–110)
CO2 SERPL-SCNC: 16 MMOL/L (ref 23–29)
CREAT SERPL-MCNC: 2.1 MG/DL (ref 0.5–1.4)
DIFFERENTIAL METHOD: ABNORMAL
EOSINOPHIL # BLD AUTO: ABNORMAL K/UL (ref 0–0.5)
EOSINOPHIL NFR BLD: 3 % (ref 0–8)
ERYTHROCYTE [DISTWIDTH] IN BLOOD BY AUTOMATED COUNT: 14 % (ref 11.5–14.5)
EST. GFR  (NO RACE VARIABLE): 28 ML/MIN/1.73 M^2
GLUCOSE SERPL-MCNC: 211 MG/DL (ref 70–110)
HCT VFR BLD AUTO: 32 % (ref 37–48.5)
HGB BLD-MCNC: 10.4 G/DL (ref 12–16)
IMM GRANULOCYTES # BLD AUTO: ABNORMAL K/UL (ref 0–0.04)
IMM GRANULOCYTES NFR BLD AUTO: ABNORMAL % (ref 0–0.5)
LYMPHOCYTES # BLD AUTO: ABNORMAL K/UL (ref 1–4.8)
LYMPHOCYTES NFR BLD: 19 % (ref 18–48)
MCH RBC QN AUTO: 26.2 PG (ref 27–31)
MCHC RBC AUTO-ENTMCNC: 32.5 G/DL (ref 32–36)
MCV RBC AUTO: 81 FL (ref 82–98)
MONOCYTES # BLD AUTO: ABNORMAL K/UL (ref 0.3–1)
MONOCYTES NFR BLD: 2 % (ref 4–15)
NEUTROPHILS NFR BLD: 65 % (ref 38–73)
NEUTS BAND NFR BLD MANUAL: 11 %
NRBC BLD-RTO: 0 /100 WBC
PLATELET # BLD AUTO: 321 K/UL (ref 150–450)
PLATELET BLD QL SMEAR: ABNORMAL
PMV BLD AUTO: 11.1 FL (ref 9.2–12.9)
POCT GLUCOSE: 190 MG/DL (ref 70–110)
POCT GLUCOSE: 204 MG/DL (ref 70–110)
POCT GLUCOSE: 217 MG/DL (ref 70–110)
POCT GLUCOSE: 371 MG/DL (ref 70–110)
POTASSIUM SERPL-SCNC: 3.6 MMOL/L (ref 3.5–5.1)
RBC # BLD AUTO: 3.97 M/UL (ref 4–5.4)
SODIUM SERPL-SCNC: 131 MMOL/L (ref 136–145)
VANCOMYCIN SERPL-MCNC: 21.3 UG/ML
WBC # BLD AUTO: 10.84 K/UL (ref 3.9–12.7)

## 2023-11-21 PROCEDURE — 94761 N-INVAS EAR/PLS OXIMETRY MLT: CPT

## 2023-11-21 PROCEDURE — 11000001 HC ACUTE MED/SURG PRIVATE ROOM

## 2023-11-21 PROCEDURE — 97530 THERAPEUTIC ACTIVITIES: CPT

## 2023-11-21 PROCEDURE — 97161 PT EVAL LOW COMPLEX 20 MIN: CPT

## 2023-11-21 PROCEDURE — 97535 SELF CARE MNGMENT TRAINING: CPT

## 2023-11-21 PROCEDURE — 25000003 PHARM REV CODE 250: Performed by: STUDENT IN AN ORGANIZED HEALTH CARE EDUCATION/TRAINING PROGRAM

## 2023-11-21 PROCEDURE — 63600175 PHARM REV CODE 636 W HCPCS: Performed by: STUDENT IN AN ORGANIZED HEALTH CARE EDUCATION/TRAINING PROGRAM

## 2023-11-21 PROCEDURE — 85027 COMPLETE CBC AUTOMATED: CPT | Performed by: HOSPITALIST

## 2023-11-21 PROCEDURE — 85007 BL SMEAR W/DIFF WBC COUNT: CPT | Performed by: HOSPITALIST

## 2023-11-21 PROCEDURE — 80048 BASIC METABOLIC PNL TOTAL CA: CPT | Performed by: HOSPITALIST

## 2023-11-21 PROCEDURE — 97165 OT EVAL LOW COMPLEX 30 MIN: CPT

## 2023-11-21 PROCEDURE — 80202 ASSAY OF VANCOMYCIN: CPT | Performed by: HOSPITALIST

## 2023-11-21 PROCEDURE — 63600175 PHARM REV CODE 636 W HCPCS: Performed by: HOSPITALIST

## 2023-11-21 PROCEDURE — 25000003 PHARM REV CODE 250: Performed by: HOSPITALIST

## 2023-11-21 RX ORDER — SODIUM CHLORIDE 9 MG/ML
INJECTION, SOLUTION INTRAVENOUS CONTINUOUS
Status: ACTIVE | OUTPATIENT
Start: 2023-11-21 | End: 2023-11-22

## 2023-11-21 RX ORDER — HYDROMORPHONE HYDROCHLORIDE 1 MG/ML
0.5 INJECTION, SOLUTION INTRAMUSCULAR; INTRAVENOUS; SUBCUTANEOUS EVERY 6 HOURS PRN
Status: DISCONTINUED | OUTPATIENT
Start: 2023-11-21 | End: 2023-11-22

## 2023-11-21 RX ADMIN — HEPARIN SODIUM 5000 UNITS: 5000 INJECTION INTRAVENOUS; SUBCUTANEOUS at 06:11

## 2023-11-21 RX ADMIN — MUPIROCIN: 20 OINTMENT TOPICAL at 08:11

## 2023-11-21 RX ADMIN — METHOCARBAMOL 500 MG: 500 TABLET ORAL at 10:11

## 2023-11-21 RX ADMIN — METHOCARBAMOL 500 MG: 500 TABLET ORAL at 12:11

## 2023-11-21 RX ADMIN — METHOCARBAMOL 500 MG: 500 TABLET ORAL at 04:11

## 2023-11-21 RX ADMIN — INSULIN ASPART 5 UNITS: 100 INJECTION, SOLUTION INTRAVENOUS; SUBCUTANEOUS at 04:11

## 2023-11-21 RX ADMIN — HEPARIN SODIUM 5000 UNITS: 5000 INJECTION INTRAVENOUS; SUBCUTANEOUS at 02:11

## 2023-11-21 RX ADMIN — INSULIN DETEMIR 30 UNITS: 100 INJECTION, SOLUTION SUBCUTANEOUS at 08:11

## 2023-11-21 RX ADMIN — GABAPENTIN 300 MG: 300 CAPSULE ORAL at 10:11

## 2023-11-21 RX ADMIN — PIPERACILLIN AND TAZOBACTAM 4.5 G: 4; .5 INJECTION, POWDER, LYOPHILIZED, FOR SOLUTION INTRAVENOUS; PARENTERAL at 12:11

## 2023-11-21 RX ADMIN — PIPERACILLIN AND TAZOBACTAM 4.5 G: 4; .5 INJECTION, POWDER, LYOPHILIZED, FOR SOLUTION INTRAVENOUS; PARENTERAL at 10:11

## 2023-11-21 RX ADMIN — METHOCARBAMOL 500 MG: 500 TABLET ORAL at 08:11

## 2023-11-21 RX ADMIN — MUPIROCIN: 20 OINTMENT TOPICAL at 10:11

## 2023-11-21 RX ADMIN — HYDROMORPHONE HYDROCHLORIDE 0.5 MG: 1 INJECTION, SOLUTION INTRAMUSCULAR; INTRAVENOUS; SUBCUTANEOUS at 08:11

## 2023-11-21 RX ADMIN — ACETAMINOPHEN 1000 MG: 500 TABLET ORAL at 10:11

## 2023-11-21 RX ADMIN — INSULIN ASPART 10 UNITS: 100 INJECTION, SOLUTION INTRAVENOUS; SUBCUTANEOUS at 04:11

## 2023-11-21 RX ADMIN — GABAPENTIN 300 MG: 300 CAPSULE ORAL at 02:11

## 2023-11-21 RX ADMIN — PIPERACILLIN AND TAZOBACTAM 4.5 G: 4; .5 INJECTION, POWDER, LYOPHILIZED, FOR SOLUTION INTRAVENOUS; PARENTERAL at 06:11

## 2023-11-21 RX ADMIN — INSULIN ASPART 10 UNITS: 100 INJECTION, SOLUTION INTRAVENOUS; SUBCUTANEOUS at 12:11

## 2023-11-21 RX ADMIN — GABAPENTIN 300 MG: 300 CAPSULE ORAL at 08:11

## 2023-11-21 RX ADMIN — SODIUM CHLORIDE: 9 INJECTION, SOLUTION INTRAVENOUS at 12:11

## 2023-11-21 RX ADMIN — INSULIN ASPART 2 UNITS: 100 INJECTION, SOLUTION INTRAVENOUS; SUBCUTANEOUS at 08:11

## 2023-11-21 RX ADMIN — INSULIN ASPART 1 UNITS: 100 INJECTION, SOLUTION INTRAVENOUS; SUBCUTANEOUS at 10:11

## 2023-11-21 RX ADMIN — HEPARIN SODIUM 5000 UNITS: 5000 INJECTION INTRAVENOUS; SUBCUTANEOUS at 10:11

## 2023-11-21 RX ADMIN — INSULIN ASPART 10 UNITS: 100 INJECTION, SOLUTION INTRAVENOUS; SUBCUTANEOUS at 08:11

## 2023-11-21 RX ADMIN — CLINDAMYCIN PHOSPHATE 900 MG: 900 INJECTION, SOLUTION INTRAVENOUS at 07:11

## 2023-11-21 RX ADMIN — POLYETHYLENE GLYCOL 3350 17 G: 17 POWDER, FOR SOLUTION ORAL at 08:11

## 2023-11-21 NOTE — NURSING
[facility] [place]  Nurses Note -- 4 Eyes      11/21/2023       Skin assessed on: Q Shift      [x] No Pressure Injuries Present    [x]Prevention Measures Documented    [] Yes LDA  for Pressure Injury Previously documented     [] Yes New Pressure Injury Discovered   [] LDA for New Pressure Injury Added      Attending RN:  Hope Goff RN     Second RN:  MIGUEL Sparks

## 2023-11-21 NOTE — ASSESSMENT & PLAN NOTE
Patient is a 48 y F who is s/p right groin debridement on 11/19.     Recommend wound care consult to help with wound management long term.   General surgery will change dressing on 11/21, following this the changing of the packing is OK to be managed with wound care / nursing.   Prior to discharge, general surgery will remove the penrose drain.   De-escalate antibiotics per culture results    Rest of care per primary

## 2023-11-21 NOTE — SUBJECTIVE & OBJECTIVE
Interval History:   TERESA  Will change dressing at bedside today    Medications:  Continuous Infusions:  Scheduled Meds:   acetaminophen  1,000 mg Oral Q8H    clindamycin (CLEOCIN) IVPB  900 mg Intravenous Q8H    gabapentin  300 mg Oral TID    heparin (porcine)  5,000 Units Subcutaneous Q8H    insulin aspart U-100  10 Units Subcutaneous TIDWM    insulin detemir U-100  30 Units Subcutaneous Daily    methocarbamoL  500 mg Oral QID    mupirocin   Nasal BID    piperacillin-tazobactam (Zosyn) IV (PEDS and ADULTS) (extended infusion is not appropriate)  4.5 g Intravenous Q8H    polyethylene glycol  17 g Oral Daily     PRN Meds:albuterol-ipratropium, aluminum-magnesium hydroxide-simethicone, bisacodyL, dextrose 10%, dextrose 10%, glucagon (human recombinant), glucose, glucose, HYDROcodone-acetaminophen, insulin aspart U-100, magnesium oxide, magnesium oxide, melatonin, naloxone, ondansetron, oxyCODONE, oxyCODONE, potassium bicarbonate, potassium bicarbonate, potassium bicarbonate, potassium, sodium phosphates, potassium, sodium phosphates, potassium, sodium phosphates, prochlorperazine, simethicone, sodium chloride 0.9%, Pharmacy to dose Vancomycin consult **AND** vancomycin - pharmacy to dose     Review of patient's allergies indicates:  No Known Allergies  Objective:     Vital Signs (Most Recent):  Temp: 97.1 °F (36.2 °C) (11/21/23 0758)  Pulse: 75 (11/21/23 0758)  Resp: 18 (11/21/23 0758)  BP: 102/64 (11/21/23 0758)  SpO2: 96 % (11/21/23 0758) Vital Signs (24h Range):  Temp:  [97.1 °F (36.2 °C)-99.4 °F (37.4 °C)] 97.1 °F (36.2 °C)  Pulse:  [75-87] 75  Resp:  [17-18] 18  SpO2:  [91 %-99 %] 96 %  BP: (101-130)/(57-72) 102/64     Weight: (!) 149.4 kg (329 lb 5.9 oz)  Body mass index is 56.54 kg/m².    Intake/Output - Last 3 Shifts         11/19 0700  11/20 0659 11/20 0700 11/21 0659 11/21 0700 11/22 0659    P.O. 360 720     I.V. (mL/kg) 1832 (12.3)      IV Piggyback 1855.3      Total Intake(mL/kg) 4047.3 (27.1) 720 (4.8)      Urine (mL/kg/hr) 2735 (0.8) 1700 (0.5)     Emesis/NG output  0     Other  0     Stool 0 0     Blood 50 0     Total Output 2785 1700     Net +1262.3 -980            Urine Occurrence  0 x     Stool Occurrence 0 x 0 x     Emesis Occurrence  0 x              Physical Exam  Constitutional:       General: She is not in acute distress.     Appearance: Normal appearance.   HENT:      Head: Normocephalic and atraumatic.      Mouth/Throat:      Mouth: Mucous membranes are moist.   Eyes:      Pupils: Pupils are equal, round, and reactive to light.   Cardiovascular:      Rate and Rhythm: Normal rate.   Pulmonary:      Effort: Pulmonary effort is normal. No respiratory distress.   Abdominal:      General: Abdomen is flat. There is no distension.      Palpations: Abdomen is soft.   Musculoskeletal:         General: Normal range of motion.      Cervical back: Normal range of motion.      Comments: Right groin dressing in place, clean and dry   Skin:     General: Skin is warm and dry.   Neurological:      General: No focal deficit present.      Mental Status: She is alert and oriented to person, place, and time.   Psychiatric:         Mood and Affect: Mood normal.         Behavior: Behavior normal.          Significant Labs:  I have reviewed all pertinent lab results within the past 24 hours.    Significant Diagnostics:  I have reviewed all pertinent imaging results/findings within the past 24 hours.

## 2023-11-21 NOTE — ASSESSMENT & PLAN NOTE
This patient does have evidence of infective focus  My overall impression is sepsis.  Source: Skin and Soft Tissue (location right groin)  Antibiotics given-   Antibiotics (72h ago, onward)      Start     Stop Route Frequency Ordered    11/19/23 1100  mupirocin 2 % ointment         11/24/23 0859 Nasl 2 times daily 11/19/23 0951    11/19/23 0330  piperacillin-tazobactam (ZOSYN) 4.5 g in dextrose 5 % in water (D5W) 100 mL IVPB (MB+)         -- IV Every 8 hours (non-standard times) 11/19/23 0128          Latest lactate reviewed-  Recent Labs   Lab 11/19/23  0426   LACTATE 1.6     Sepsis secondary to group B Strep skin infection.  ABX's as above.

## 2023-11-21 NOTE — PLAN OF CARE
Problem: Occupational Therapy  Goal: Occupational Therapy Goal  Description: Goals to be met by: 12/05/23    Patient will increase functional independence with ADLs by performing:    UE Dressing with Modified [place].  LE Dressing with Modified [place] and Assistive Devices as needed.  Grooming while standing at sink with Modified [place].  Toileting from toilet with Modified [place] for hygiene and clothing management.   Supine to sit with Modified [place].  Step transfer with Modified [place]  Toilet transfer to toilet with Modified [place].  Upper extremity exercise program x15 reps per handout, with independence.    Outcome: Ongoing, Progressing     Pt limited with activity to a few sidesteps at EOB d/t dizziness and nausea (primarily with movement). BP sitting EOB after >5 minutes: 102/66, MAP 78, 74 bpm. Pt received IV pain medication and per sister, pt did not eat much of her breakfast.

## 2023-11-21 NOTE — ASSESSMENT & PLAN NOTE
Patient with acute kidney injury/acute renal failure likely due to acute tubular necrosis caused by probable combination of infection and antibiotics.  DAVIDE is currently worsening. Baseline creatinine  1  - Labs reviewed- Renal function/electrolytes with Estimated Creatinine Clearance: 47.4 mL/min (A) (based on SCr of 2.1 mg/dL (H)). according to latest data. Monitor urine output and serial BMP and adjust therapy as needed. Avoid nephrotoxins and renally dose meds for GFR listed above.  Stop Vanc.  IVF hydration.  Closely monitor urine output and Creat.  Repeat labs in Am.

## 2023-11-21 NOTE — PROGRESS NOTES
Therapy with vancomycin complete and/or consult discontinued by provider.  Pharmacy will sign off, please re-consult as needed. Thank You

## 2023-11-21 NOTE — PROGRESS NOTES
[facility] Medicine  Progress Note    Patient Name: Ashanti Thompson  MRN: [id number]  Patient Class: IP- Inpatient   Admission Date: 11/18/2023  Length of Stay: 3 days  Attending Physician: Rajiv Jorge MD  Primary Care Provider: Galdino Angulo MD        Subjective:     Principal Problem:Abscess of right groin        HPI:  This is a 48-year-old female with a past medical history of type 2 diabetes, hyperlipidemia and morbid obesity who presents with groin pain.    Patient developed right groin swelling and pain that started about 3 days prior to presentation.  She denies noticing any discharge or pus.  Additional symptoms include fevers, chills and diarrhea.    In the ED, the patient was hemodynamically stable.  CT abdomen and pelvis showed (1) edematous/inflammatory process involving the inferior medial right groin and perineum and extending posteriorly to the level of the inferior right buttock, there is appearance of edema and inflammation and areas of greater induration inferiorly, (2) there is air density within the soft tissue (possibly of gas producing infectious organisms should be considered). Patient was given 1.6 L of NS, Zosyn, clindamycin, and morphine 8 mg IV.  Surgery was consulted & notified by ED provider.  She was admitted for further management.    Overview/Hospital Course:  47 y/o female with past medical history of type 2 diabetes, hyperlipidemia and morbid obesity who presents with groin pain.  CT showing   Edematous/inflammatory process involving the inferior medial right groin and perineum and extending posteriorly to the level of the inferior right buttock, there is appearance of edema and inflammation and areas of greater induration inferiorly.  Along the aforementioned distribution of abnormality there is air density within the soft tissues possibly of gas producing infectious organisms should be considered.   Surgery was consulted and patient had urgent I&D and  drain placement.  Started on broad spectrum ABX's.  Wound Care consulted.    Interval History: feeling dizzy and weak.    Review of Systems   HENT:  Negative for ear discharge and ear pain.    Eyes:  Negative for discharge and itching.   Endocrine: Negative for cold intolerance and heat intolerance.   Neurological:  Negative for seizures and syncope.     Objective:     Vital Signs (Most Recent):  Temp: 97.1 °F (36.2 °C) (11/21/23 1137)  Pulse: 77 (11/21/23 1137)  Resp: 18 (11/21/23 1137)  BP: 98/68 (11/21/23 1137)  SpO2: 96 % (11/21/23 1137) Vital Signs (24h Range):  Temp:  [97.1 °F (36.2 °C)-99.4 °F (37.4 °C)] 97.1 °F (36.2 °C)  Pulse:  [75-86] 77  Resp:  [18] 18  SpO2:  [91 %-99 %] 96 %  BP: ()/(57-68) 98/68     Weight: (!) 149.4 kg (329 lb 5.9 oz)  Body mass index is 56.54 kg/m².    Intake/Output Summary (Last 24 hours) at 11/21/2023 1215  Last data filed at 11/21/2023 0400  Gross per 24 hour   Intake 480 ml   Output 1700 ml   Net -1220 ml         Physical Exam  Constitutional:       General: She is not in acute distress.     Appearance: Normal appearance.   HENT:      Head: Normocephalic and atraumatic.      Mouth/Throat:      Mouth: Mucous membranes are moist.   Eyes:      Pupils: Pupils are equal, round, and reactive to light.   Cardiovascular:      Rate and Rhythm: Normal rate.   Pulmonary:      Effort: Pulmonary effort is normal. No respiratory distress.   Abdominal:      General: Abdomen is flat. There is no distension.      Palpations: Abdomen is soft.   Musculoskeletal:         General: Normal range of motion.      Cervical back: Normal range of motion.      Comments: Right groin dressing in place, clean and dry   Skin:     General: Skin is warm and dry.   Neurological:      General: No focal deficit present.      Mental Status: She is alert and oriented to person, place, and time.   Psychiatric:         Mood and Affect: Mood normal.         Behavior: Behavior normal.             Significant Labs:  All pertinent labs within the past 24 hours have been reviewed.  BMP:   Recent Labs   Lab 11/21/23  0430   *   *   K 3.6      CO2 16*   BUN 21*   CREATININE 2.1*   CALCIUM 8.9     CBC:   Recent Labs   Lab 11/20/23  0337 11/21/23  0430   WBC 6.74 10.84   HGB 9.8* 10.4*   HCT 30.8* 32.0*    321       Significant Imaging: I have reviewed all pertinent imaging results/findings within the past 24 hours.    Assessment/Plan:      * Abscess of right groin  CT abdomen and pelvis showed (1) edematous/inflammatory process involving the inferior medial right groin and perineum and extending posteriorly to the level of the inferior right buttock, there is appearance of edema and inflammation and areas of greater induration inferiorly, (2) there is air density within the soft tissue (possibly of gas producing infectious organisms should be considered).   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  S/P I&D and debridement by surgery.  Continue wound care.  Wound cultures growing group B Strep.  Patient now with worsening Creat.  Will stop Vanc and Clinda.  Continue Zosyn and consult ID for ABX course recommendations.      DAVIDE (acute kidney injury)  Patient with acute kidney injury/acute renal failure likely due to acute tubular necrosis caused by probable combination of infection and antibiotics.  DAVIDE is currently worsening. Baseline creatinine  1  - Labs reviewed- Renal function/electrolytes with Estimated Creatinine Clearance: 47.4 mL/min (A) (based on SCr of 2.1 mg/dL (H)). according to latest data. Monitor urine output and serial BMP and adjust therapy as needed. Avoid nephrotoxins and renally dose meds for GFR listed above.  Stop Vanc.  IVF hydration.  Closely monitor urine output and Creat.  Repeat labs in Am.    Morbid obesity  Body mass index is 56.54 kg/m². Morbid obesity complicates all aspects of disease management from  diagnostic modalities to treatment. Weight loss encouraged and health benefits explained to patient.         Type 2 diabetes mellitus, without long-term current use of insulin  Glycemic protocol   LDSS      Sepsis  This patient does have evidence of infective focus  My overall impression is sepsis.  Source: Skin and Soft Tissue (location right groin)  Antibiotics given-   Antibiotics (72h ago, onward)      Start     Stop Route Frequency Ordered    11/19/23 1100  mupirocin 2 % ointment         11/24/23 0859 Nasl 2 times daily 11/19/23 0951    11/19/23 0330  piperacillin-tazobactam (ZOSYN) 4.5 g in dextrose 5 % in water (D5W) 100 mL IVPB (MB+)         -- IV Every 8 hours (non-standard times) 11/19/23 0128          Latest lactate reviewed-  Recent Labs   Lab 11/19/23  0426   LACTATE 1.6     Sepsis secondary to group B Strep skin infection.  ABX's as above.      VTE Risk Mitigation (From admission, onward)           Ordered     heparin (porcine) injection 5,000 Units  Every 8 hours         11/19/23 0121     IP VTE HIGH RISK PATIENT  Once         11/19/23 0121     Place sequential compression device  Until discontinued         11/19/23 0121                    Discharge Planning   FORREST:      Code Status: Full Code   Is the patient medically ready for discharge?:     Reason for patient still in hospital (select all that apply): Patient trending condition and Treatment                     Rajiv Jorge MD  Department of Hospital Medicine   [facility] Surg

## 2023-11-21 NOTE — PLAN OF CARE
Problem: Physical Therapy  Goal: Physical Therapy Goal  Description: Goals to be met by: 23     Patient will increase functional independence with mobility by performin. Supine to sit with Stand-by Assistance  2. Sit to stand transfer with Supervision  3. Gait  x 50 feet with Supervision using Rolling Walker.   4. Lower extremity exercise program x10 reps per handout, with supervision    Outcome: Ongoing, Progressing   Initial PT evaluation performed today.  Pt could benefit from skilled PT services 3-5x/wk in order to maximize function prior to D/C.  Ongoing assessment pending Pt progress for disposition and DME.

## 2023-11-21 NOTE — PROGRESS NOTES
Pharmacokinetic Assessment Follow Up: IV Vancomycin    Vancomycin serum concentration assessment(s):    The random level was drawn correctly and can be used to guide therapy at this time. The measurement is above the desired definitive target range of 10 to 20 mcg/mL.    Vancomycin Regimen Plan:    Re-dose when the random level is less than 20 mcg/mL, next level to be drawn at 1600 on 11/21/2023    Drug levels (last 3 results):  Recent Labs   Lab Result Units 11/20/23  1313 11/21/23  0429   Vancomycin, Random ug/mL  --  21.3   Vancomycin-Trough ug/mL 28.2*  --        Pharmacy will continue to follow and monitor vancomycin.    Please contact pharmacy at extension [phone] for questions regarding this assessment.    Thank you for the consult,   Garth Li Jr       Patient brief summary:  Ashanti Thompson is a 48 y.o. female initiated on antimicrobial therapy with IV Vancomycin for treatment of skin & soft tissue infection    Drug Allergies:   Review of patient's allergies indicates:  No Known Allergies    Actual Body Weight:   149.4 kg    Renal Function:   Estimated Creatinine Clearance: 47.4 mL/min (A) (based on SCr of 2.1 mg/dL (H)).,     Dialysis Method (if applicable):  N/A    CBC (last 72 hours):  Recent Labs   Lab Result Units 11/19/23 0426 11/20/23 0337 11/21/23  0430   WBC K/uL 14.37* 6.74 10.84   Hemoglobin g/dL 11.7* 9.8* 10.4*   Hematocrit % 36.8* 30.8* 32.0*   Platelets K/uL 280 257 321   Gran % % 84.9* 77.8* 65.0   Lymph % % 6.9* 15.4* 19.0   Mono % % 7.4 5.2 2.0*   Eosinophil % % 0.0 0.6 3.0   Basophil % % 0.3 0.3 0.0   Differential Method  Automated Automated Manual       Metabolic Panel (last 72 hours):  Recent Labs   Lab Result Units 11/18/23  2103 11/19/23  0357 11/19/23 0426 11/20/23  0337 11/21/23  0430   Sodium mmol/L 135*  --  133* 129* 131*   Potassium mmol/L 3.4*  --  3.6 3.2* 3.6   Chloride mmol/L 100  --  101 102 102   CO2 mmol/L 22*  --  20* 18* 16*   Glucose mg/dL 356*  --  389* 347*  211*   Glucose, UA   --  4+*  --   --   --    BUN mg/dL 9  --  9 13 21*   Creatinine mg/dL 0.9  --  0.9 1.0 2.1*   Albumin g/dL 2.5*  --  2.3*  --   --    Total Bilirubin mg/dL 1.8*  --  1.7*  --   --    Alkaline Phosphatase U/L 174*  --  183*  --   --    AST U/L 11  --  9*  --   --    ALT U/L 11  --  11  --   --    Magnesium mg/dL  --   --  1.6  --   --    Phosphorus mg/dL  --   --  3.5  --   --        Vancomycin Administrations:  vancomycin given in the last 96 hours                     vancomycin (VANCOCIN) 2,000 mg in dextrose 5 % (D5W) 500 mL IVPB (mg) 2,000 mg New Bag 11/20/23 0227     2,000 mg New Bag 11/19/23 1439    vancomycin (VANCOCIN) 2,500 mg in dextrose 5 % (D5W) 500 mL IVPB (mg) 2,500 mg New Bag 11/19/23 0147                    Microbiologic Results:  Microbiology Results (last 7 days)       Procedure Component Value Units Date/Time    Aerobic culture [[id number]]  (Abnormal) Collected: 11/19/23 0913    Order Status: Completed Specimen: Incision site from Groin Updated: 11/21/23 0707     Aerobic Bacterial Culture STREPTOCOCCUS AGALACTIAE (GROUP B)  Moderate  Beta-hemolytic streptococci are routinely susceptible to   penicillins,cephalosporins and carbapenems.      Narrative:      Right Groin    Urine culture [[id number]] Collected: 11/19/23 0355    Order Status: Completed Specimen: Urine Updated: 11/21/23 0705     Urine Culture, Routine No significant growth    Narrative:      Indicated criteria for high risk culture:->Other  Other (specify):->Sepsis    Blood culture x two cultures. Draw prior to antibiotics. [[id number]] Collected: 11/18/23 2005    Order Status: Completed Specimen: Blood from Peripheral, Hand, Right Updated: 11/21/23 0303     Blood Culture, Routine No Growth to date      No Growth to date      No Growth to date    Narrative:      Aerobic and anaerobic    Blood culture x two cultures. Draw prior to antibiotics. [[id number]] Collected: 11/18/23 1945    Order Status: Completed Specimen:  Blood from Peripheral, Antecubital, Right Updated: 11/21/23 0303     Blood Culture, Routine No Growth to date      No Growth to date      No Growth to date    Narrative:      Aerobic and anaerobic    Culture, Anaerobe [[id number]] Collected: 11/19/23 0913    Order Status: Sent Specimen: Incision site from Groin Updated: 11/19/23 1002

## 2023-11-21 NOTE — NURSING
[facility] [place]  Nurses Note -- 4 Eyes      11/21/2023       Skin assessed on: Q Shift      [x] No Pressure Injuries Present    []Prevention Measures Documented    [] Yes LDA  for Pressure Injury Previously documented     [] Yes New Pressure Injury Discovered   [] LDA for New Pressure Injury Added      Attending RN:  Elizabeth Mancera RN     Second: JOSEU Hoyos

## 2023-11-21 NOTE — PROGRESS NOTES
[facility]  General Surgery  Progress Note    Subjective:     History of Present Illness:  No notes on file    Post-Op Info:  Procedure(s) (LRB):  DEBRIDEMENT, WOUND RIGHT GROINc (Right)   2 Days Post-Op     Interval History:   TERESA  Will change dressing at bedside today    Medications:  Continuous Infusions:  Scheduled Meds:   acetaminophen  1,000 mg Oral Q8H    clindamycin (CLEOCIN) IVPB  900 mg Intravenous Q8H    gabapentin  300 mg Oral TID    heparin (porcine)  5,000 Units Subcutaneous Q8H    insulin aspart U-100  10 Units Subcutaneous TIDWM    insulin detemir U-100  30 Units Subcutaneous Daily    methocarbamoL  500 mg Oral QID    mupirocin   Nasal BID    piperacillin-tazobactam (Zosyn) IV (PEDS and ADULTS) (extended infusion is not appropriate)  4.5 g Intravenous Q8H    polyethylene glycol  17 g Oral Daily     PRN Meds:albuterol-ipratropium, aluminum-magnesium hydroxide-simethicone, bisacodyL, dextrose 10%, dextrose 10%, glucagon (human recombinant), glucose, glucose, HYDROcodone-acetaminophen, insulin aspart U-100, magnesium oxide, magnesium oxide, melatonin, naloxone, ondansetron, oxyCODONE, oxyCODONE, potassium bicarbonate, potassium bicarbonate, potassium bicarbonate, potassium, sodium phosphates, potassium, sodium phosphates, potassium, sodium phosphates, prochlorperazine, simethicone, sodium chloride 0.9%, Pharmacy to dose Vancomycin consult **AND** vancomycin - pharmacy to dose     Review of patient's allergies indicates:  No Known Allergies  Objective:     Vital Signs (Most Recent):  Temp: 97.1 °F (36.2 °C) (11/21/23 0758)  Pulse: 75 (11/21/23 0758)  Resp: 18 (11/21/23 0758)  BP: 102/64 (11/21/23 0758)  SpO2: 96 % (11/21/23 0758) Vital Signs (24h Range):  Temp:  [97.1 °F (36.2 °C)-99.4 °F (37.4 °C)] 97.1 °F (36.2 °C)  Pulse:  [75-87] 75  Resp:  [17-18] 18  SpO2:  [91 %-99 %] 96 %  BP: (101-130)/(57-72) 102/64     Weight: (!) 149.4 kg (329 lb 5.9 oz)  Body mass index is 56.54  kg/m².    Intake/Output - Last 3 Shifts         11/19 0700  11/20 0659 11/20 0700  11/21 0659 11/21 0700  11/22 0659    P.O. 360 720     I.V. (mL/kg) 1832 (12.3)      IV Piggyback 1855.3      Total Intake(mL/kg) 4047.3 (27.1) 720 (4.8)     Urine (mL/kg/hr) 2735 (0.8) 1700 (0.5)     Emesis/NG output  0     Other  0     Stool 0 0     Blood 50 0     Total Output 2785 1700     Net +1262.3 -980            Urine Occurrence  0 x     Stool Occurrence 0 x 0 x     Emesis Occurrence  0 x             Physical Exam  Constitutional:       General: She is not in acute distress.     Appearance: Normal appearance.   HENT:      Head: Normocephalic and atraumatic.      Mouth/Throat:      Mouth: Mucous membranes are moist.   Eyes:      Pupils: Pupils are equal, round, and reactive to light.   Cardiovascular:      Rate and Rhythm: Normal rate.   Pulmonary:      Effort: Pulmonary effort is normal. No respiratory distress.   Abdominal:      General: Abdomen is flat. There is no distension.      Palpations: Abdomen is soft.   Musculoskeletal:         General: Normal range of motion.      Cervical back: Normal range of motion.      Comments: Right groin dressing in place, clean and dry   Skin:     General: Skin is warm and dry.   Neurological:      General: No focal deficit present.      Mental Status: She is alert and oriented to person, place, and time.   Psychiatric:         Mood and Affect: Mood normal.         Behavior: Behavior normal.          Significant Labs:  I have reviewed all pertinent lab results within the past 24 hours.    Significant Diagnostics:  I have reviewed all pertinent imaging results/findings within the past 24 hours.  Assessment/Plan:     * Abscess of right groin  Patient is a 48 y F who is s/p right groin debridement on 11/19.     Recommend wound care consult to help with wound management long term.   General surgery will change dressing on 11/21, following this the changing of the packing is OK to be managed  with wound care / nursing.   Prior to discharge, general surgery will remove the penrose drain.   De-escalate antibiotics per culture results    Rest of care per primary         Brie Christianson MD  General Surgery  [facility]

## 2023-11-21 NOTE — ASSESSMENT & PLAN NOTE
CT abdomen and pelvis showed (1) edematous/inflammatory process involving the inferior medial right groin and perineum and extending posteriorly to the level of the inferior right buttock, there is appearance of edema and inflammation and areas of greater induration inferiorly, (2) there is air density within the soft tissue (possibly of gas producing infectious organisms should be considered).   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  S/P I&D and debridement by surgery.  Continue wound care.  Wound cultures growing group B Strep.  Patient now with worsening Creat.  Will stop Vanc and Clinda.  Continue Zosyn and consult ID for ABX course recommendations.

## 2023-11-21 NOTE — SUBJECTIVE & OBJECTIVE
Interval History: feeling dizzy and weak.    Review of Systems   HENT:  Negative for ear discharge and ear pain.    Eyes:  Negative for discharge and itching.   Endocrine: Negative for cold intolerance and heat intolerance.   Neurological:  Negative for seizures and syncope.     Objective:     Vital Signs (Most Recent):  Temp: 97.1 °F (36.2 °C) (11/21/23 1137)  Pulse: 77 (11/21/23 1137)  Resp: 18 (11/21/23 1137)  BP: 98/68 (11/21/23 1137)  SpO2: 96 % (11/21/23 1137) Vital Signs (24h Range):  Temp:  [97.1 °F (36.2 °C)-99.4 °F (37.4 °C)] 97.1 °F (36.2 °C)  Pulse:  [75-86] 77  Resp:  [18] 18  SpO2:  [91 %-99 %] 96 %  BP: ()/(57-68) 98/68     Weight: (!) 149.4 kg (329 lb 5.9 oz)  Body mass index is 56.54 kg/m².    Intake/Output Summary (Last 24 hours) at 11/21/2023 1215  Last data filed at 11/21/2023 0400  Gross per 24 hour   Intake 480 ml   Output 1700 ml   Net -1220 ml         Physical Exam  Constitutional:       General: She is not in acute distress.     Appearance: Normal appearance.   HENT:      Head: Normocephalic and atraumatic.      Mouth/Throat:      Mouth: Mucous membranes are moist.   Eyes:      Pupils: Pupils are equal, round, and reactive to light.   Cardiovascular:      Rate and Rhythm: Normal rate.   Pulmonary:      Effort: Pulmonary effort is normal. No respiratory distress.   Abdominal:      General: Abdomen is flat. There is no distension.      Palpations: Abdomen is soft.   Musculoskeletal:         General: Normal range of motion.      Cervical back: Normal range of motion.      Comments: Right groin dressing in place, clean and dry   Skin:     General: Skin is warm and dry.   Neurological:      General: No focal deficit present.      Mental Status: She is alert and oriented to person, place, and time.   Psychiatric:         Mood and Affect: Mood normal.         Behavior: Behavior normal.             Significant Labs: All pertinent labs within the past 24 hours have been reviewed.  BMP:   Recent  Labs   Lab 11/21/23  0430   *   *   K 3.6      CO2 16*   BUN 21*   CREATININE 2.1*   CALCIUM 8.9     CBC:   Recent Labs   Lab 11/20/23  0337 11/21/23  0430   WBC 6.74 10.84   HGB 9.8* 10.4*   HCT 30.8* 32.0*    321       Significant Imaging: I have reviewed all pertinent imaging results/findings within the past 24 hours.

## 2023-11-21 NOTE — PT/OT/SLP EVAL
Occupational Therapy   Evaluation    Name: Ashanti Thompson  MRN: [id number]  Admitting Diagnosis: Abscess of right groin  Recent Surgery: Procedure(s) (LRB):  DEBRIDEMENT, WOUND RIGHT GROINc (Right) 2 Days Post-Op    Recommendations:     Discharge Recommendations:  (ongoing assessment pending further pt participation)  Discharge Equipment Recommendations:   (TBD)  Barriers to discharge:   (nauseous, dizziness; limited to minimal mobility and ADLs at EOB)    Assessment:     Ashanti Thompson is a 48 y.o. female with a medical diagnosis of Abscess of right groin. Performance deficits affecting function: weakness, decreased ROM, impaired self care skills, impaired functional mobility, impaired skin, decreased lower extremity function, impaired cardiopulmonary response to activity, impaired balance, pain, decreased safety awareness, gait instability, edema, impaired endurance.      Pt limited with activity to a few sidesteps at EOB d/t dizziness and nausea (primarily with movement). BP sitting EOB after >5 minutes: 102/66, MAP 78, 74 bpm. Pt received IV pain medication and per sister, pt did not eat much of her breakfast     Rehab Prognosis: Good; patient would benefit from acute skilled OT services to address these deficits and reach maximum level of function.       Plan:     Patient to be seen  (3-5x/week) to address the above listed problems via self-care/home management, therapeutic activities, therapeutic exercises  Plan of Care Expires: 12/05/23  Plan of Care Reviewed with: patient, sibling    Subjective     Chief Complaint: dizzy, nausea with movement   Patient/Family Comments/goals: agreeable to eval     Occupational Profile:  Living Environment: pt lives with her 3 dependent children in a [facility] with no concerns at entry.   Previous level of function: independent with mobility and ADLs   Roles and Routines: drives; works as a CNA   Equipment Used at Home: none  Assistance upon Discharge: twin sister (present)      Pain/Comfort:  Pain Rating 1:  (c/o some pain to incision (near groin/thigh); nausea and dizziness)  Pain Addressed 1: Reposition, Distraction, Cessation of Activity, Nurse notified    Patients cultural, spiritual, Catholic conflicts given the current situation: no    Objective:     Communicated with: nurseElizabeth, prior to session.  Patient found  HOB elevated with B/L heels offloaded by pillow  with diaz catheter, peripheral IV (penrose drain) upon OT entry to room.    General Precautions: Standard, diabetic, fall  Orthopedic Precautions: N/A  Braces: N/A  Respiratory Status: Room air    Occupational Performance:    Bed Mobility:    Patient completed Scooting anteriorly with minimum assistance  Patient completed Supine to Sit with minimum assistance, with side rail, and HOB elevated   Patient completed Sit to Supine with moderate assistance    Functional Mobility/Transfers:  Patient completed Sit <> Stand Transfer with contact guard assistance  with  rolling walker   Functional Mobility: Gait belt donned prior to transfer for safety during mobility/transfers. Pt completed ~4 sidesteps at EOB using RW with CGA. Pt with continued dizziness and nausea, needing to sit down.     Activities of Daily Living:  Feeding:  supervision seated EOB to eat ice chips   Grooming: supervision seated EOB with cold washcloth d/t nausea   Upper Body Dressing: minimum assistance to don/doff back gown   Lower Body Dressing: total assistance to adjust R sock     Cognitive/Visual Perceptual:  Cognitive/Psychosocial Skills:     -       Follows Commands/attention:Follows multistep  commands  -       Communication: clear/fluent  -       Memory: No Deficits noted  -       Safety awareness/insight to disability: minimally impaired 2* appearing drowsy (likely from pain medication) and dizzy    -       Mood/Affect/Coping skills/emotional control: drowsy   Visual/Perceptual:      -Intact  R/L discrimination      Physical Exam:  Balance:     -       seated: SUP; standing: CGA with RW   Postural examination/scapula alignment:    -       Rounded shoulders  Skin integrity: Visible skin intact  Edema:  no BUE edema noted   Sensation:    -       Intact  light/touch BUE   Dominant hand:    -       Right   Upper Extremity Range of Motion:     -       Right Upper Extremity: WFL  -       Left Upper Extremity: WFL  Upper Extremity Strength:    -       Right Upper Extremity: WFL  -       Left Upper Extremity: WFL   Strength:    -       Right Upper Extremity: WFL  -       Left Upper Extremity: WFL  Fine Motor Coordination:    -       Intact  Left hand, manipulation of objects and Right hand, manipulation of objects  Gross motor coordination:   WFL    AMPAC 6 Click ADL:  AMPAC Total Score: 19    Treatment & Education:  Pt re-educated on OT role/POC.   Importance of OOB activity with staff assistance.  Edu pt and sister on dressing tips: RLE threaded into underwear first to don, threaded out last to doff; encouraged wear of dresses/skirts for ease of ADL  Safety during functional t/f and mobility   Multiple self-care tasks/functional mobility completed- assistance level noted above   All questions/concerns answered within OT scope of practice       Patient left HOB elevated with B/L heels offloaded by pillow with all lines intact, call button in reach, bed alarm on, nurse, Elizabeth, notified, sister present, and all needs met/within reach. B/L SCDs in place.     GOALS:   Multidisciplinary Problems       Occupational Therapy Goals          Problem: Occupational Therapy    Goal Priority Disciplines Outcome Interventions   Occupational Therapy Goal     OT, PT/OT Ongoing, Progressing    Description: Goals to be met by: 12/05/23    Patient will increase functional independence with ADLs by performing:    UE Dressing with Modified [place].  LE Dressing with Modified [place] and Assistive Devices as needed.  Grooming while standing at sink with Modified  [place].  Toileting from toilet with Modified [place] for hygiene and clothing management.   Supine to sit with Modified [place].  Step transfer with Modified [place]  Toilet transfer to toilet with Modified [place].  Upper extremity exercise program x15 reps per handout, with independence.                         History:     Past Medical History:   Diagnosis Date    Diabetes mellitus     Hyperlipemia     Iron deficiency          Past Surgical History:   Procedure Laterality Date     SECTION      WOUND DEBRIDEMENT Right 2023    Procedure: DEBRIDEMENT, WOUND RIGHT GROINc;  Surgeon: Vadim Prather MD;  Location: [facility];  Service: General;  Laterality: Right;       Time Tracking:     OT Date of Treatment: 23  OT Start Time: 1006  OT Stop Time: 1031  OT Total Time (min): 25 min    Billable Minutes:Evaluation 15 min  Self Care/Home Management 10 min  Total Time 25 min (co-eval with PT)    2023

## 2023-11-21 NOTE — PT/OT/SLP EVAL
Physical Therapy Evaluation    Patient Name:  Ashanti Thompson   MRN:  [id number]    Recommendations:     Discharge Recommendations:   Ongoing assessment pending pt progress  Discharge Equipment Recommendations:  (Ongoing assessment)   Barriers to discharge:  Pt has only made 3-4 sidesteps at EOB with CGA and RW    Assessment:     Ashanti Thompson is a 48 y.o. female admitted with a medical diagnosis of Abscess of right groin.  She presents with the following impairments/functional limitations: impaired balance, decreased safety awareness, pain, impaired endurance, impaired self care skills, decreased coordination, impaired functional mobility, impaired coordination, gait instability, impaired skin .    Rehab Prognosis: Good; patient would benefit from acute skilled PT services to address these deficits and reach maximum level of function.    Recent Surgery: Procedure(s) (LRB):  DEBRIDEMENT, WOUND RIGHT GROINc (Right) 2 Days Post-Op    Plan:     During this hospitalization, patient to be seen  (3-5x/wk) to address the identified rehab impairments via gait training, therapeutic activities, therapeutic exercises, neuromuscular re-education and progress toward the following goals:    Plan of Care Expires:  12/05/23    Subjective     Chief Complaint: dizzy, nausea pain  Patient/Family Comments/goals: Pt agreeable to evaluation  Pain/Comfort:  Pain Rating 1: 3/10  Pain Addressed 1: Reposition, Distraction, Cessation of Activity    Patients cultural, spiritual, Yazidi conflicts given the current situation: no    Living Environment:  Pt lives with her 3 dependent sons in a  home  Prior to admission, patients level of function was Independent with ambulation.  Equipment used at home: none.  DME owned (not currently used): none.  Upon discharge, patient will have assistance from Sister.    Objective:     Communicated with nsg prior to session.  Patient found HOB elevated with SCD, diaz catheter  upon PT entry to  "room.    General Precautions: Standard, fall  Orthopedic Precautions:N/A   Braces: N/A  Respiratory Status: Room air    Exams:  Cognitive Exam:  Patient is oriented to Person, Place, Time, and Situation  Gross Motor Coordination:  impaired 2/2 pain, weakness, body habitus  Postural Exam:  Patient presented with the following abnormalities:    -       Rounded shoulders  -       Forward head  Sensation:    -       Intact  light/touch L<R  Skin Integrity/Edema:      -       Skin integrity: Visible skin intact  -       Edema: None noted    RLE ROM: WFL  RLE Strength: WFL  LLE ROM: WFL  LLE Strength: WFL    Functional Mobility:  Bed Mobility:     Scooting: minimum assistance  Supine to Sit: minimum assistance  Sit to Supine: moderate assistance  Transfers:     Sit to Stand:  contact guard assistance with rolling walker  Gait: CGA with RW approx 4 sidesteps at EOB   Balance: Fair+/Fair sit, Fair stand      AM-PAC 6 CLICK MOBILITY  Total Score:14       Treatment & Education:  Educated pt on PT POC, to "call before you fall, to perform B AP's, GS, and TKE's while in bed throughout the day.    Patient left HOB elevated with all lines intact, call button in reach, nsg notified, and sister present.    GOALS:   Multidisciplinary Problems       Physical Therapy Goals          Problem: Physical Therapy    Goal Priority Disciplines Outcome Goal Variances Interventions   Physical Therapy Goal     PT, PT/OT Ongoing, Progressing     Description: Goals to be met by: 23     Patient will increase functional independence with mobility by performin. Supine to sit with Stand-by Assistance  2. Sit to stand transfer with Supervision  3. Gait  x 50 feet with Supervision using Rolling Walker.   4. Lower extremity exercise program x10 reps per handout, with supervision                         History:     Past Medical History:   Diagnosis Date    Diabetes mellitus     Hyperlipemia     Iron deficiency        Past Surgical History: "   Procedure Laterality Date     SECTION      WOUND DEBRIDEMENT Right 2023    Procedure: DEBRIDEMENT, WOUND RIGHT GROINc;  Surgeon: Vadim Prather MD;  Location: [facility] [address];  Service: General;  Laterality: Right;       Time Tracking:     PT Received On: 23  PT Start Time: 1005     PT Stop Time: 1032  PT Total Time (min): 27 min     Billable Minutes: Evaluation 15 and Therapeutic Activity 12      2023

## 2023-11-22 LAB
ALBUMIN SERPL BCP-MCNC: 1.9 G/DL (ref 3.5–5.2)
ALP SERPL-CCNC: 209 U/L (ref 55–135)
ALT SERPL W/O P-5'-P-CCNC: 14 U/L (ref 10–44)
ANION GAP SERPL CALC-SCNC: 12 MMOL/L (ref 8–16)
AST SERPL-CCNC: 16 U/L (ref 10–40)
BACTERIA SPEC AEROBE CULT: ABNORMAL
BACTERIA SPEC ANAEROBE CULT: ABNORMAL
BILIRUB SERPL-MCNC: 0.7 MG/DL (ref 0.1–1)
BUN SERPL-MCNC: 23 MG/DL (ref 6–20)
CALCIUM SERPL-MCNC: 9.3 MG/DL (ref 8.7–10.5)
CHLORIDE SERPL-SCNC: 102 MMOL/L (ref 95–110)
CO2 SERPL-SCNC: 20 MMOL/L (ref 23–29)
CREAT SERPL-MCNC: 2.3 MG/DL (ref 0.5–1.4)
EST. GFR  (NO RACE VARIABLE): 25 ML/MIN/1.73 M^2
GLUCOSE SERPL-MCNC: 175 MG/DL (ref 70–110)
POCT GLUCOSE: 182 MG/DL (ref 70–110)
POCT GLUCOSE: 237 MG/DL (ref 70–110)
POCT GLUCOSE: 254 MG/DL (ref 70–110)
POCT GLUCOSE: 302 MG/DL (ref 70–110)
POTASSIUM SERPL-SCNC: 3.2 MMOL/L (ref 3.5–5.1)
PROT SERPL-MCNC: 7.4 G/DL (ref 6–8.4)
SODIUM SERPL-SCNC: 134 MMOL/L (ref 136–145)

## 2023-11-22 PROCEDURE — 25000003 PHARM REV CODE 250: Performed by: HOSPITALIST

## 2023-11-22 PROCEDURE — 80053 COMPREHEN METABOLIC PANEL: CPT | Performed by: HOSPITALIST

## 2023-11-22 PROCEDURE — 25000003 PHARM REV CODE 250: Performed by: INTERNAL MEDICINE

## 2023-11-22 PROCEDURE — 63600175 PHARM REV CODE 636 W HCPCS: Performed by: HOSPITALIST

## 2023-11-22 PROCEDURE — 97116 GAIT TRAINING THERAPY: CPT

## 2023-11-22 PROCEDURE — 99223 1ST HOSP IP/OBS HIGH 75: CPT | Mod: ,,, | Performed by: INTERNAL MEDICINE

## 2023-11-22 PROCEDURE — 97110 THERAPEUTIC EXERCISES: CPT

## 2023-11-22 PROCEDURE — C1751 CATH, INF, PER/CENT/MIDLINE: HCPCS

## 2023-11-22 PROCEDURE — 25000003 PHARM REV CODE 250: Performed by: STUDENT IN AN ORGANIZED HEALTH CARE EDUCATION/TRAINING PROGRAM

## 2023-11-22 PROCEDURE — 36410 VNPNXR 3YR/> PHY/QHP DX/THER: CPT

## 2023-11-22 PROCEDURE — 63600175 PHARM REV CODE 636 W HCPCS: Performed by: INTERNAL MEDICINE

## 2023-11-22 PROCEDURE — 97535 SELF CARE MNGMENT TRAINING: CPT

## 2023-11-22 PROCEDURE — 99223 PR INITIAL HOSPITAL CARE,LEVL III: ICD-10-PCS | Mod: ,,, | Performed by: INTERNAL MEDICINE

## 2023-11-22 PROCEDURE — 36415 COLL VENOUS BLD VENIPUNCTURE: CPT | Performed by: HOSPITALIST

## 2023-11-22 PROCEDURE — 11000001 HC ACUTE MED/SURG PRIVATE ROOM

## 2023-11-22 PROCEDURE — 63600175 PHARM REV CODE 636 W HCPCS: Performed by: STUDENT IN AN ORGANIZED HEALTH CARE EDUCATION/TRAINING PROGRAM

## 2023-11-22 RX ORDER — ACETAMINOPHEN 325 MG/1
650 TABLET ORAL EVERY 4 HOURS PRN
Status: DISCONTINUED | OUTPATIENT
Start: 2023-11-22 | End: 2023-11-24 | Stop reason: HOSPADM

## 2023-11-22 RX ORDER — HYDROCODONE BITARTRATE AND ACETAMINOPHEN 5; 325 MG/1; MG/1
1 TABLET ORAL EVERY 4 HOURS PRN
Status: DISCONTINUED | OUTPATIENT
Start: 2023-11-22 | End: 2023-11-24 | Stop reason: HOSPADM

## 2023-11-22 RX ORDER — SODIUM CHLORIDE 0.9 % (FLUSH) 0.9 %
10 SYRINGE (ML) INJECTION EVERY 6 HOURS
Status: DISCONTINUED | OUTPATIENT
Start: 2023-11-22 | End: 2023-11-24 | Stop reason: HOSPADM

## 2023-11-22 RX ORDER — SODIUM CHLORIDE 0.9 % (FLUSH) 0.9 %
10 SYRINGE (ML) INJECTION
Status: DISCONTINUED | OUTPATIENT
Start: 2023-11-22 | End: 2023-11-24 | Stop reason: HOSPADM

## 2023-11-22 RX ORDER — SODIUM CHLORIDE 9 MG/ML
INJECTION, SOLUTION INTRAVENOUS CONTINUOUS
Status: ACTIVE | OUTPATIENT
Start: 2023-11-22 | End: 2023-11-23

## 2023-11-22 RX ADMIN — METHOCARBAMOL 500 MG: 500 TABLET ORAL at 01:11

## 2023-11-22 RX ADMIN — ACETAMINOPHEN 1000 MG: 500 TABLET ORAL at 06:11

## 2023-11-22 RX ADMIN — HEPARIN SODIUM 5000 UNITS: 5000 INJECTION INTRAVENOUS; SUBCUTANEOUS at 06:11

## 2023-11-22 RX ADMIN — GABAPENTIN 300 MG: 300 CAPSULE ORAL at 08:11

## 2023-11-22 RX ADMIN — METHOCARBAMOL 500 MG: 500 TABLET ORAL at 08:11

## 2023-11-22 RX ADMIN — GABAPENTIN 300 MG: 300 CAPSULE ORAL at 03:11

## 2023-11-22 RX ADMIN — INSULIN ASPART 10 UNITS: 100 INJECTION, SOLUTION INTRAVENOUS; SUBCUTANEOUS at 05:11

## 2023-11-22 RX ADMIN — INSULIN ASPART 10 UNITS: 100 INJECTION, SOLUTION INTRAVENOUS; SUBCUTANEOUS at 12:11

## 2023-11-22 RX ADMIN — HEPARIN SODIUM 5000 UNITS: 5000 INJECTION INTRAVENOUS; SUBCUTANEOUS at 01:11

## 2023-11-22 RX ADMIN — SODIUM CHLORIDE: 9 INJECTION, SOLUTION INTRAVENOUS at 03:11

## 2023-11-22 RX ADMIN — HEPARIN SODIUM 5000 UNITS: 5000 INJECTION INTRAVENOUS; SUBCUTANEOUS at 09:11

## 2023-11-22 RX ADMIN — MUPIROCIN: 20 OINTMENT TOPICAL at 09:11

## 2023-11-22 RX ADMIN — INSULIN ASPART 1 UNITS: 100 INJECTION, SOLUTION INTRAVENOUS; SUBCUTANEOUS at 09:11

## 2023-11-22 RX ADMIN — METHOCARBAMOL 500 MG: 500 TABLET ORAL at 05:11

## 2023-11-22 RX ADMIN — MUPIROCIN: 20 OINTMENT TOPICAL at 08:11

## 2023-11-22 RX ADMIN — ACETAMINOPHEN 650 MG: 325 TABLET ORAL at 09:11

## 2023-11-22 RX ADMIN — GABAPENTIN 300 MG: 300 CAPSULE ORAL at 09:11

## 2023-11-22 RX ADMIN — ACETAMINOPHEN 325 MG: 325 TABLET ORAL at 12:11

## 2023-11-22 RX ADMIN — INSULIN ASPART 4 UNITS: 100 INJECTION, SOLUTION INTRAVENOUS; SUBCUTANEOUS at 05:11

## 2023-11-22 RX ADMIN — CEFTRIAXONE 2 G: 2 INJECTION, POWDER, FOR SOLUTION INTRAMUSCULAR; INTRAVENOUS at 01:11

## 2023-11-22 RX ADMIN — INSULIN ASPART 10 UNITS: 100 INJECTION, SOLUTION INTRAVENOUS; SUBCUTANEOUS at 08:11

## 2023-11-22 RX ADMIN — INSULIN DETEMIR 30 UNITS: 100 INJECTION, SOLUTION SUBCUTANEOUS at 08:11

## 2023-11-22 RX ADMIN — METHOCARBAMOL 500 MG: 500 TABLET ORAL at 09:11

## 2023-11-22 RX ADMIN — INSULIN ASPART 2 UNITS: 100 INJECTION, SOLUTION INTRAVENOUS; SUBCUTANEOUS at 12:11

## 2023-11-22 NOTE — PROGRESS NOTES
[facility] Medicine  Progress Note    Patient Name: Ashanti Thompson  MRN: [id number]  Patient Class: IP- Inpatient   Admission Date: 11/18/2023  Length of Stay: 4 days  Attending Physician: Rajiv Jorge MD  Primary Care Provider: Galdino Angulo MD        Subjective:     Principal Problem:Abscess of right groin        HPI:  This is a 48-year-old female with a past medical history of type 2 diabetes, hyperlipidemia and morbid obesity who presents with groin pain.    Patient developed right groin swelling and pain that started about 3 days prior to presentation.  She denies noticing any discharge or pus.  Additional symptoms include fevers, chills and diarrhea.    In the ED, the patient was hemodynamically stable.  CT abdomen and pelvis showed (1) edematous/inflammatory process involving the inferior medial right groin and perineum and extending posteriorly to the level of the inferior right buttock, there is appearance of edema and inflammation and areas of greater induration inferiorly, (2) there is air density within the soft tissue (possibly of gas producing infectious organisms should be considered). Patient was given 1.6 L of NS, Zosyn, clindamycin, and morphine 8 mg IV.  Surgery was consulted & notified by ED provider.  She was admitted for further management.    Overview/Hospital Course:  49 y/o female with past medical history of type 2 diabetes, hyperlipidemia and morbid obesity who presents with groin pain.  CT showing   Edematous/inflammatory process involving the inferior medial right groin and perineum and extending posteriorly to the level of the inferior right buttock, there is appearance of edema and inflammation and areas of greater induration inferiorly.  Along the aforementioned distribution of abnormality there is air density within the soft tissues possibly of gas producing infectious organisms should be considered.   Surgery was consulted and patient had urgent I&D and  drain placement.  Started on broad spectrum ABX's.  Wound Care consulted.  DAVIDE and Vanc stopped.  Started on IVF's.  ID consulted for ABX recommendations.    Interval History: feeling better.    Review of Systems   HENT:  Negative for ear discharge and ear pain.    Eyes:  Negative for discharge and itching.   Endocrine: Negative for cold intolerance and heat intolerance.   Neurological:  Negative for seizures and syncope.     Objective:     Vital Signs (Most Recent):  Temp: 98.2 °F (36.8 °C) (11/22/23 1209)  Pulse: 77 (11/22/23 1209)  Resp: 20 (11/22/23 1209)  BP: 130/77 (11/22/23 1209)  SpO2: 96 % (11/22/23 1209) Vital Signs (24h Range):  Temp:  [97.4 °F (36.3 °C)-98.8 °F (37.1 °C)] 98.2 °F (36.8 °C)  Pulse:  [72-81] 77  Resp:  [18-20] 20  SpO2:  [93 %-98 %] 96 %  BP: (108-131)/(59-77) 130/77     Weight: (!) 149.4 kg (329 lb 5.9 oz)  Body mass index is 56.54 kg/m².    Intake/Output Summary (Last 24 hours) at 11/22/2023 1353  Last data filed at 11/22/2023 1317  Gross per 24 hour   Intake 120 ml   Output 2800 ml   Net -2680 ml           Physical Exam  Constitutional:       General: She is not in acute distress.     Appearance: Normal appearance.   HENT:      Head: Normocephalic and atraumatic.      Mouth/Throat:      Mouth: Mucous membranes are moist.   Eyes:      Pupils: Pupils are equal, round, and reactive to light.   Cardiovascular:      Rate and Rhythm: Normal rate.   Pulmonary:      Effort: Pulmonary effort is normal. No respiratory distress.   Abdominal:      General: Abdomen is flat. There is no distension.      Palpations: Abdomen is soft.   Musculoskeletal:         General: Normal range of motion.      Cervical back: Normal range of motion.      Comments: Right groin dressing in place, clean and dry   Skin:     General: Skin is warm and dry.   Neurological:      General: No focal deficit present.      Mental Status: She is alert and oriented to person, place, and time.   Psychiatric:         Mood and  Affect: Mood normal.         Behavior: Behavior normal.             Significant Labs: All pertinent labs within the past 24 hours have been reviewed.  BMP:   Recent Labs   Lab 11/22/23  0600   *   *   K 3.2*      CO2 20*   BUN 23*   CREATININE 2.3*   CALCIUM 9.3       CBC:   Recent Labs   Lab 11/21/23  0430   WBC 10.84   HGB 10.4*   HCT 32.0*            Significant Imaging: I have reviewed all pertinent imaging results/findings within the past 24 hours.    Assessment/Plan:      * Abscess of right groin  CT abdomen and pelvis showed (1) edematous/inflammatory process involving the inferior medial right groin and perineum and extending posteriorly to the level of the inferior right buttock, there is appearance of edema and inflammation and areas of greater induration inferiorly, (2) there is air density within the soft tissue (possibly of gas producing infectious organisms should be considered).   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  S/P I&D and debridement by surgery.  Continue wound care.  Wound cultures growing group B Strep.  Patient now with worsening Creat.  Stopped Vanc and Clinda.  Continued Zosyn and consult ID for ABX course recommendations.  Home on oral ABX's and wound care once Creat starts improving.      DAVIDE (acute kidney injury)  Patient with acute kidney injury/acute renal failure likely due to acute tubular necrosis caused by probable combination of infection and antibiotics.  DAVIDE is currently worsening. Baseline creatinine  1  - Labs reviewed- Renal function/electrolytes with Estimated Creatinine Clearance: 43.3 mL/min (A) (based on SCr of 2.3 mg/dL (H)). according to latest data. Monitor urine output and serial BMP and adjust therapy as needed. Avoid nephrotoxins and renally dose meds for GFR listed above.  Stopped Vanc.  IVF hydration.  Just slight increase in Creat today.  Hopefully it has peaked  "and will start improving.  Good urine output.    Morbid obesity  Body mass index is 56.54 kg/m². Morbid obesity complicates all aspects of disease management from diagnostic modalities to treatment. Weight loss encouraged and health benefits explained to patient.         Type 2 diabetes mellitus, without long-term current use of insulin  Glycemic protocol   LDSS      Sepsis  This patient does have evidence of infective focus  My overall impression is sepsis.  Source: Skin and Soft Tissue (location right groin)  Antibiotics given-   Antibiotics (72h ago, onward)      Start     Stop Route Frequency Ordered    11/22/23 1030  cefTRIAXone (ROCEPHIN) 2 g in dextrose 5 % in water (D5W) 100 mL IVPB (MB+)         -- IV Every 24 hours (non-standard times) 11/22/23 0916    11/19/23 1100  mupirocin 2 % ointment         11/24/23 0859 Nasl 2 times daily 11/19/23 0951          Latest lactate reviewed-  No results for input(s): "LACTATE" in the last 72 hours.    Sepsis secondary to group B Strep skin infection.  ABX's as above.      VTE Risk Mitigation (From admission, onward)           Ordered     heparin (porcine) injection 5,000 Units  Every 8 hours         11/19/23 0121     IP VTE HIGH RISK PATIENT  Once         11/19/23 0121     Place sequential compression device  Until discontinued         11/19/23 0121                    Discharge Planning   FORREST:      Code Status: Full Code   Is the patient medically ready for discharge?:     Reason for patient still in hospital (select all that apply): Patient trending condition and Laboratory test                     Rajiv Jorge MD  Department of Hospital Medicine   [facility] - Med Surg    "

## 2023-11-22 NOTE — PT/OT/SLP PROGRESS
Occupational Therapy   Treatment    Name: Ashanti Thompson  MRN: [id number]  Admitting Diagnosis:  Abscess of right groin  3 Days Post-Op    Recommendations:     Discharge Recommendations: Low Intensity Therapy  Discharge Equipment Recommendations:   (TBD pending progress)  Barriers to discharge:   none    Assessment:     Ashanti Thompson is a 48 y.o. female with a medical diagnosis of Abscess of right groin.  Performance deficits affecting function are impaired self care skills, impaired functional mobility, weakness, impaired endurance, gait instability, decreased lower extremity function, pain, impaired balance, impaired skin, edema.   The patient reports feeling better today with decreased c/o pain and no nausea. The patient required CGA/SBA for overall functional mobility. The patient demo improved overall mobility and activity tolerance.     Rehab Prognosis:  Good; patient would benefit from acute skilled OT services to address these deficits and reach maximum level of function.       Plan:     Patient to be seen 3 x/week to address the above listed problems via therapeutic activities, therapeutic exercises, self-care/home management  Plan of Care Expires: 12/05/23  Plan of Care Reviewed with: patient, sibling, parent (mother and sister)    Subjective     Chief Complaint: feels better  Patient/Family Comments/goals: agreeable to sit in the chair  Pain/Comfort:  Pain Rating 1: 4/10  Location - Side 1: Right  Location 1: groin  Pain Addressed 1: Reposition, Distraction, Cessation of Activity    Objective:     Communicated with: nurse prior to session.  Patient found HOB elevated with diaz catheter, peripheral IV, SCD (Oenrose Drain) upon OT entry to room.    General Precautions: Standard, diabetic, fall    Orthopedic Precautions:N/A  Braces: N/A  Respiratory Status: Room air     Occupational Performance:     Bed Mobility:    Patient completed Scooting/Bridging with stand by assistance  Patient completed Supine to Sit  with contact guard assistance and HOB elevated      Functional Mobility/Transfers:  Patient completed Sit <> Stand Transfer with stand by assistance  with  rolling walker   Functional Mobility: The patient amb uing a RW and SBA and VC for RW use and safety.     Activities of Daily Living: The patient was found in bed wearing  her personal night gown. The patient reports bathing and changing clothes earier with family assist.  Grooming: The patient declined to perform grooming tasks with OT stating she completed earlier with family    Lower Body Dressing: dependence to don B socks      [facility] 6 Click ADL: 19    Treatment & Education:  The patient performed self care and functional mobility as noted above  The patient was provided a soft air cushion to sit in the chair. The patient was educated re: benefits of sitting in the chair throughout the day and need to call nurse to return to bed ot amb to the bathroom.    Patient left up in chair with all lines intact, call button in reach, nurse notified, and mother and sister present    GOALS:   Multidisciplinary Problems       Occupational Therapy Goals          Problem: Occupational Therapy    Goal Priority Disciplines Outcome Interventions   Occupational Therapy Goal     OT, PT/OT Ongoing, Progressing    Description: Goals to be met by: 12/05/23    Patient will increase functional independence with ADLs by performing:    UE Dressing with Modified Lueders.  LE Dressing with Modified Lueders and Assistive Devices as needed.  Grooming while standing at sink with Modified Lueders.  Toileting from toilet with Modified Lueders for hygiene and clothing management.   Supine to sit with Modified Lueders.  Step transfer with Modified Lueders  Toilet transfer to toilet with Modified Lueders.  Upper extremity exercise program x15 reps per handout, with independence.                         Time Tracking:     OT Date of Treatment: 11/22/23  OT Start  Time: 1123  OT Stop Time: 1141  OT Total Time (min): 18 min    Billable Minutes:Self Care/Home Management 18   Total Time 18 (with PT)    OT/SCOTTY: OT     Number of SCOTTY visits since last OT visit: 0    11/22/2023

## 2023-11-22 NOTE — PT/OT/SLP PROGRESS
Physical Therapy Treatment    Patient Name:  Ashanti Thompson   MRN:  [id number]    Recommendations:     Discharge Recommendations: Low Intensity Therapy  Discharge Equipment Recommendations:  (Wide BSC and Wide RW)  Barriers to discharge:  Wound care, drain, not functioning at baseline    Assessment:     Ashanti Thompson is a 48 y.o. female admitted with a medical diagnosis of Abscess of right groin.  She presents with the following impairments/functional limitations: decreased coordination, impaired balance, impaired self care skills, impaired skin, pain, impaired endurance, impaired functional mobility, gait instability .    Rehab Prognosis: Good; patient would benefit from acute skilled PT services to address these deficits and reach maximum level of function.    Recent Surgery: Procedure(s) (LRB):  DEBRIDEMENT, WOUND RIGHT GROINc (Right) 3 Days Post-Op    Plan:     During this hospitalization, patient to be seen  (3-5x/wk) to address the identified rehab impairments via gait training, therapeutic activities, neuromuscular re-education, therapeutic exercises and progress toward the following goals:    Plan of Care Expires:  12/05/23    Subjective     Chief Complaint: pain   Patient/Family Comments/goals: Pt in Good spirits, states that she got OOB yesterday evening, Agreeable to treatment  Pain/Comfort:  Pain Rating 1: 4/10  Location - Orientation 1:  (R groin)  Pain Addressed 1: Reposition, Distraction, Cessation of Activity, Nurse notified  Pain Rating Post-Intervention 1: 8/10      Objective:     Communicated with nsg prior to session.  Patient found HOB elevated with diaz catheter, peripheral IV, SCD (Penrose drain) upon PT entry to room.     General Precautions: Standard, fall, diabetic  Orthopedic Precautions: N/A  Braces: N/A  Respiratory Status: Room air     Functional Mobility:  Bed Mobility:     Scooting: stand by assistance  Supine to Sit: contact guard assistance  Transfers:     Sit to Stand:  Stand by  "assistance with RW and VC for hand placement and forward weight shift over GUI from elevated bed  Gait: Gait training with SBA/CGA and VC/TC for increased trunk ext and walker safety approx 18', pt ambulates with increased stride width 2/2 R groin pain and decreased justine  Balance: Fair+ sit, Fair+/Fair stand      AM-PAC 6 CLICK MOBILITY  Turning over in bed (including adjusting bedclothes, sheets and blankets)?: 3  Sitting down on and standing up from a chair with arms (e.g., wheelchair, bedside commode, etc.): 3  Moving from lying on back to sitting on the side of the bed?: 3  Moving to and from a bed to a chair (including a wheelchair)?: 3  Need to walk in hospital room?: 3  Climbing 3-5 steps with a railing?: 2  Basic Mobility Total Score: 17       Treatment & Education:  Handout provided to and reviewed with pt for AP's, FAq's, and marching to be performed in sitting and AP's, TKE's, and GS to be performed in reclined position.  Educated pt to sit up in chair at least 2-3x/day and to "call before you fall"     Patient left up in chair with all lines intact, call button in reach, nsg notified, and family present..    GOALS:   Multidisciplinary Problems       Physical Therapy Goals          Problem: Physical Therapy    Goal Priority Disciplines Outcome Goal Variances Interventions   Physical Therapy Goal     PT, PT/OT Ongoing, Progressing     Description: Goals to be met by: 23     Patient will increase functional independence with mobility by performin. Supine to sit with Stand-by Assistance  2. Sit to stand transfer with Supervision  3. Gait  x 50 feet with Supervision using Rolling Walker.   4. Lower extremity exercise program x10 reps per handout, with supervision                         Time Tracking:     PT Received On: 23  PT Start Time: 1120     PT Stop Time: 1143  PT Total Time (min): 23 min     Billable Minutes: Gait Training 15 and Therapeutic Exercise 8 Co-treat with " OT    Treatment Type: Evaluation, Treatment  PT/PTA: PT     Number of PTA visits since last PT visit: 0     11/22/2023

## 2023-11-22 NOTE — PLAN OF CARE
Problem: Physical Therapy  Goal: Physical Therapy Goal  Description: Goals to be met by: 23     Patient will increase functional independence with mobility by performin. Supine to sit with Stand-by Assistance  2. Sit to stand transfer with Supervision  3. Gait  x 50 feet with Supervision using Rolling Walker.   4. Lower extremity exercise program x10 reps per handout, with supervision    Outcome: Ongoing, Progressing   Low Level therapy, Wide RW, and Wide BSC recommended

## 2023-11-22 NOTE — ASSESSMENT & PLAN NOTE
49 yo woman admitted with GBS abscess, now s/p I&D  - improving  - de-escalate abx to CTX while in house  - when ready for discharge, recommend cefadroxil 1g followed by 500 mg po BID x 7 days  - d/w patient and sisters

## 2023-11-22 NOTE — ASSESSMENT & PLAN NOTE
Patient with acute kidney injury/acute renal failure likely due to acute tubular necrosis caused by probable combination of infection and antibiotics.  DAVIDE is currently worsening. Baseline creatinine  1  - Labs reviewed- Renal function/electrolytes with Estimated Creatinine Clearance: 43.3 mL/min (A) (based on SCr of 2.3 mg/dL (H)). according to latest data. Monitor urine output and serial BMP and adjust therapy as needed. Avoid nephrotoxins and renally dose meds for GFR listed above.  Stopped Vanc.  IVF hydration.  Just slight increase in Creat today.  Hopefully it has peaked and will start improving.  Good urine output.

## 2023-11-22 NOTE — ASSESSMENT & PLAN NOTE
CT abdomen and pelvis showed (1) edematous/inflammatory process involving the inferior medial right groin and perineum and extending posteriorly to the level of the inferior right buttock, there is appearance of edema and inflammation and areas of greater induration inferiorly, (2) there is air density within the soft tissue (possibly of gas producing infectious organisms should be considered).   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  S/P I&D and debridement by surgery.  Continue wound care.  Wound cultures growing group B Strep.  Patient now with worsening Creat.  Stopped Vanc and Clinda.  Continued Zosyn and consult ID for ABX course recommendations.  Home on oral ABX's and wound care once Creat starts improving.

## 2023-11-22 NOTE — HPI
Ms. Thompson is a pleasant 47 yo twin, admitted with a GBS furuncle of her thigh. She first noticed pain when using the toilet on Saturday and that worsened despite using a heating pad. Her sister made her come to the ED. She was taken to the OR for I&D and that culture grew GBS. The aptient has been on Zosyn and ID is consulted for abx recommendations. The patient tells me that she is feeling much improved.

## 2023-11-22 NOTE — CONSULTS
[facility]  Infectious Disease  Consult Note    Patient Name: Ashanti Thompson  MRN: [id number]  Admission Date: 2023  Hospital Length of Stay: 4 days  Attending Physician: Rajiv Jorge MD  Primary Care Provider: Galdino Angulo MD     Isolation Status: No active isolations    Patient information was obtained from patient, relative(s), and ER records.      Inpatient consult to Infectious Diseases  Consult performed by: Patrick Tucker MD  Consult ordered by: Rajiv Jorge MD        Assessment/Plan:     ID  * Abscess of right groin  49 yo woman admitted with GBS abscess, now s/p I&D  - improving  - de-escalate abx to CTX while in house  - when ready for discharge, recommend cefadroxil 1g followed by 500 mg po BID x 7 days  - d/w patient and sisters    Thank you for your consult. I will sign off. Please contact us if you have any additional questions.    Patrick Tucker MD  Infectious Disease  [facility]    Subjective:     Principal Problem: Abscess of right groin    HPI: Ms. Thompson is a pleasant 49 yo twin, admitted with a GBS furuncle of her thigh. She first noticed pain when using the toilet on Saturday and that worsened despite using a heating pad. Her sister made her come to the ED. She was taken to the OR for I&D and that culture grew GBS. The aptient has been on Zosyn and ID is consulted for abx recommendations. The patient tells me that she is feeling much improved.    Past Medical History:   Diagnosis Date    Diabetes mellitus     Hyperlipemia     Iron deficiency        Past Surgical History:   Procedure Laterality Date     SECTION      WOUND DEBRIDEMENT Right 2023    Procedure: DEBRIDEMENT, WOUND RIGHT GROINc;  Surgeon: Vadim Prather MD;  Location: [facility] OR;  Service: General;  Laterality: Right;       Review of patient's allergies indicates:  No Known Allergies    Medications:  Medications Prior to Admission   Medication Sig    ACCU-CHEK BRIDGER  PLUS METER Misc     azithromycin (Z-KORI) 250 MG tablet Take 1 tablet (250 mg total) by mouth once daily. 1 pack, take as directed    blood-glucose meter (FREESTYLE SYSTEM KIT) kit Of patients choice, kervin brand    blood-glucose meter (FREESTYLE SYSTEM KIT) kit Use as directed    blood-glucose meter (FREESTYLE SYSTEM KIT) kit One kit as directed    BOOSTRIX TDAP 2.5-8-5 Lf-mcg-Lf/0.5mL Susp     clotrimazole (LOTRIMIN) 1 % cream Apply to affected area 2 times daily    ergocalciferol (ERGOCALCIFEROL) 50,000 unit Cap TAKE 1 CAPSULE BY MOUTH EVERY 7 DAYS    etodolac (LODINE) 400 MG tablet Take 1 tablet (400 mg total) by mouth 2 (two) times daily.    etodolac (LODINE) 400 MG tablet TAKE 1 TABLET BY MOUTH TWICE DAILY    fluticasone (FLONASE) 50 mcg/actuation nasal spray SHAKE LIQUID AND USE 1 SPRAY(50 MCG) IN EACH NOSTRIL EVERY DAY    FLUVIRIN 3048-1033 45 mcg (15 mcg x 3)/0.5 mL Susp     glipiZIDE (GLUCOTROL) 5 MG tablet Take 1 tablet (5 mg total) by mouth daily with breakfast.    glyBURIDE (DIABETA) 5 MG tablet     ibuprofen (ADVIL,MOTRIN) 600 MG tablet Take 1 tablet (600 mg total) by mouth every 8 (eight) hours as needed for Pain.    indomethacin (INDOCIN) 50 MG capsule TAKE 1 CAPSULE(50 MG) BY MOUTH TWICE DAILY WITH MEALS    ketoconazole (NIZORAL) 2 % cream Apply topically once daily.    LUTERA, 28, 0.1-20 mg-mcg per tablet     meloxicam (MOBIC) 15 MG tablet Po daily    metformin (GLUCOPHAGE) 500 MG tablet Take 1 tablet (500 mg total) by mouth once daily.    metFORMIN (GLUCOPHAGE) 500 MG tablet TAKE 1 TABLET BY MOUTH EVERY DAY WITH BREAKFAST    metFORMIN (GLUCOPHAGE) 500 MG tablet Take 1 tablet (500 mg total) by mouth daily with breakfast.    metFORMIN (GLUCOPHAGE) 500 MG tablet Take 1 tablet (500 mg total) by mouth 2 (two) times daily with meals. TAKE 1 TABLET(500 MG) BY MOUTH DAILY WITH BREAKFAST    pravastatin (PRAVACHOL) 40 MG tablet     TRADJENTA 5 mg Tab tablet TAKE 1 TABLET(5 MG) BY MOUTH EVERY DAY     TRUETEST TEST STRIPS Strp      Antibiotics (From admission, onward)      Start     Stop Route Frequency Ordered    11/22/23 1030  cefTRIAXone (ROCEPHIN) 2 g in dextrose 5 % in water (D5W) 100 mL IVPB (MB+)         -- IV Every 24 hours (non-standard times) 11/22/23 0916    11/19/23 1100  mupirocin 2 % ointment         11/24/23 0859 Nasl 2 times daily 11/19/23 0951          Antifungals (From admission, onward)      None          Antivirals (From admission, onward)      None             Immunization History   Administered Date(s) Administered    COVID-19, MRNA, LN-S, PF (Pfizer) (Purple Cap) 06/18/2021, 07/09/2021       Family History       Problem Relation (Age of Onset)    Diabetes Father          Social History     Socioeconomic History    Marital status: Single   Tobacco Use    Smoking status: Never    Smokeless tobacco: Never   Substance and Sexual Activity    Alcohol use: No     Alcohol/week: 0.0 standard drinks of alcohol    Drug use: No     Review of Systems   Constitutional:  Positive for fever. Negative for chills.   Skin:  Positive for wound.   All other systems reviewed and are negative.    Objective:     Vital Signs (Most Recent):  Temp: 97.9 °F (36.6 °C) (11/22/23 0831)  Pulse: 72 (11/22/23 0831)  Resp: 20 (11/22/23 0831)  BP: 111/65 (11/22/23 0831)  SpO2: 96 % (11/22/23 0831) Vital Signs (24h Range):  Temp:  [97.1 °F (36.2 °C)-98.8 °F (37.1 °C)] 97.9 °F (36.6 °C)  Pulse:  [72-81] 72  Resp:  [18-20] 20  SpO2:  [93 %-98 %] 96 %  BP: ()/(59-75) 111/65     Weight: (!) 149.4 kg (329 lb 5.9 oz)  Body mass index is 56.54 kg/m².    Estimated Creatinine Clearance: 43.3 mL/min (A) (based on SCr of 2.3 mg/dL (H)).     Physical Exam  Vitals and nursing note reviewed.   Constitutional:       Appearance: Normal appearance.   HENT:      Head: Normocephalic and atraumatic.   Neurological:      General: No focal deficit present.      Mental Status: She is alert and oriented to person, place, and time.    Psychiatric:         Mood and Affect: Mood normal.         Behavior: Behavior normal.         Thought Content: Thought content normal.         Judgment: Judgment normal.     Remainder of exam deferred due to current use of bedpan.     Significant Labs: Blood Culture:   Recent Labs   Lab 11/18/23 1945 11/18/23 2005   LABBLOO No Growth to date  No Growth to date  No Growth to date  No Growth to date No Growth to date  No Growth to date  No Growth to date  No Growth to date     BMP:   Recent Labs   Lab 11/22/23  0600   *   *   K 3.2*      CO2 20*   BUN 23*   CREATININE 2.3*   CALCIUM 9.3     CBC:   Recent Labs   Lab 11/21/23  0430   WBC 10.84   HGB 10.4*   HCT 32.0*        Wound Culture:   Recent Labs   Lab 11/19/23  0913   LABAERO STREPTOCOCCUS AGALACTIAE (GROUP B)  Moderate  Beta-hemolytic streptococci are routinely susceptible to   penicillins,cephalosporins and carbapenems.  *       Significant Imaging: I have reviewed all pertinent imaging results/findings within the past 24 hours.

## 2023-11-22 NOTE — ASSESSMENT & PLAN NOTE
"This patient does have evidence of infective focus  My overall impression is sepsis.  Source: Skin and Soft Tissue (location right groin)  Antibiotics given-   Antibiotics (72h ago, onward)    Start     Stop Route Frequency Ordered    11/22/23 1030  cefTRIAXone (ROCEPHIN) 2 g in dextrose 5 % in water (D5W) 100 mL IVPB (MB+)         -- IV Every 24 hours (non-standard times) 11/22/23 0916    11/19/23 1100  mupirocin 2 % ointment         11/24/23 0859 Nasl 2 times daily 11/19/23 0951        Latest lactate reviewed-  No results for input(s): "LACTATE" in the last 72 hours.    Sepsis secondary to group B Strep skin infection.  ABX's as above.  "

## 2023-11-22 NOTE — NURSING
[facility]  Nurses Note -- 4 Eyes      11/22/2023       Skin assessed on: Q Shift      [x] No Pressure Injuries Present    []Prevention Measures Documented    [] Yes LDA  for Pressure Injury Previously documented     [] Yes New Pressure Injury Discovered   [] LDA for New Pressure Injury Added      Attending RN:  Elizabeth Mancera RN     Second: JOSUE Brown

## 2023-11-22 NOTE — NURSING
[facility]  Nurses Note -- 4 Eyes      11/22/2023       Skin assessed on: Q Shift      [x] No Pressure Injuries Present    []Prevention Measures Documented    [] Yes LDA  for Pressure Injury Previously documented     [] Yes New Pressure Injury Discovered   [] LDA for New Pressure Injury Added      Attending RN:  Hope Goff RN     Second RN:  MIGUEL Johnson

## 2023-11-22 NOTE — TREATMENT PLAN
General Surgery Treatment Plan:     Patient is a 48 y F who is s/p right groin debridement on 11/19.      Recommend wound care consult to help with wound management long term. Appreciate assistance, daily packing changes.   Prior to discharge, general surgery will remove the penrose drain. Recommend a day or two of dressing changes prior to discharge.   De-escalate antibiotics per culture results     Rest of care per primary     Yuki Johnson MD  Pager: [phone]  General Surgery PGY-III  [facility]

## 2023-11-22 NOTE — PROGRESS NOTES
Consulted back bc midline that was placed wasn't flushing. I evaluated it, it was difficult to flush, seemed to be kinked, unable to get kink out, so I removed it and placed in left arm.

## 2023-11-22 NOTE — SUBJECTIVE & OBJECTIVE
Past Medical History:   Diagnosis Date    Diabetes mellitus     Hyperlipemia     Iron deficiency        Past Surgical History:   Procedure Laterality Date     SECTION      WOUND DEBRIDEMENT Right 2023    Procedure: DEBRIDEMENT, WOUND RIGHT GROINc;  Surgeon: Vadim Prather MD;  Location: [facility];  Service: General;  Laterality: Right;       Review of patient's allergies indicates:  No Known Allergies    Medications:  Medications Prior to Admission   Medication Sig    ACCU-CHEK BRIDGER PLUS METER Misc     azithromycin (Z-KORI) 250 MG tablet Take 1 tablet (250 mg total) by mouth once daily. 1 pack, take as directed    blood-glucose meter (FREESTYLE SYSTEM KIT) kit Of patients choice, kervin brand    blood-glucose meter (FREESTYLE SYSTEM KIT) kit Use as directed    blood-glucose meter (FREESTYLE SYSTEM KIT) kit One kit as directed    BOOSTRIX TDAP 2.5-8-5 Lf-mcg-Lf/0.5mL Susp     clotrimazole (LOTRIMIN) 1 % cream Apply to affected area 2 times daily    ergocalciferol (ERGOCALCIFEROL) 50,000 unit Cap TAKE 1 CAPSULE BY MOUTH EVERY 7 DAYS    etodolac (LODINE) 400 MG tablet Take 1 tablet (400 mg total) by mouth 2 (two) times daily.    etodolac (LODINE) 400 MG tablet TAKE 1 TABLET BY MOUTH TWICE DAILY    fluticasone (FLONASE) 50 mcg/actuation nasal spray SHAKE LIQUID AND USE 1 SPRAY(50 MCG) IN EACH NOSTRIL EVERY DAY    FLUVIRIN [id number] 45 mcg (15 mcg x 3)/0.5 mL Susp     glipiZIDE (GLUCOTROL) 5 MG tablet Take 1 tablet (5 mg total) by mouth daily with breakfast.    glyBURIDE (DIABETA) 5 MG tablet     ibuprofen (ADVIL,MOTRIN) 600 MG tablet Take 1 tablet (600 mg total) by mouth every 8 (eight) hours as needed for Pain.    indomethacin (INDOCIN) 50 MG capsule TAKE 1 CAPSULE(50 MG) BY MOUTH TWICE DAILY WITH MEALS    ketoconazole (NIZORAL) 2 % cream Apply topically once daily.    LUTERA, 28, 0.1-20 mg-mcg per tablet     meloxicam (MOBIC) 15 MG tablet Po daily    metformin (GLUCOPHAGE) 500 MG tablet Take 1  tablet (500 mg total) by mouth once daily.    metFORMIN (GLUCOPHAGE) 500 MG tablet TAKE 1 TABLET BY MOUTH EVERY DAY WITH BREAKFAST    metFORMIN (GLUCOPHAGE) 500 MG tablet Take 1 tablet (500 mg total) by mouth daily with breakfast.    metFORMIN (GLUCOPHAGE) 500 MG tablet Take 1 tablet (500 mg total) by mouth 2 (two) times daily with meals. TAKE 1 TABLET(500 MG) BY MOUTH DAILY WITH BREAKFAST    pravastatin (PRAVACHOL) 40 MG tablet     TRADJENTA 5 mg Tab tablet TAKE 1 TABLET(5 MG) BY MOUTH EVERY DAY    TRUETEST TEST STRIPS Strp      Antibiotics (From admission, onward)      Start     Stop Route Frequency Ordered    11/22/23 1030  cefTRIAXone (ROCEPHIN) 2 g in dextrose 5 % in water (D5W) 100 mL IVPB (MB+)         -- IV Every 24 hours (non-standard times) 11/22/23 0916    11/19/23 1100  mupirocin 2 % ointment         11/24/23 0859 Nasl 2 times daily 11/19/23 0951          Antifungals (From admission, onward)      None          Antivirals (From admission, onward)      None             Immunization History   Administered Date(s) Administered    COVID-19, MRNA, LN-S, PF (Pfizer) (Purple Cap) 06/18/2021, 07/09/2021       Family History       Problem Relation (Age of Onset)    Diabetes Father          Social History     Socioeconomic History    Marital status: Single   Tobacco Use    Smoking status: Never    Smokeless tobacco: Never   Substance and Sexual Activity    Alcohol use: No     Alcohol/week: 0.0 standard drinks of alcohol    Drug use: No     Review of Systems   Constitutional:  Positive for fever. Negative for chills.   Skin:  Positive for wound.   All other systems reviewed and are negative.    Objective:     Vital Signs (Most Recent):  Temp: 97.9 °F (36.6 °C) (11/22/23 0831)  Pulse: 72 (11/22/23 0831)  Resp: 20 (11/22/23 0831)  BP: 111/65 (11/22/23 0831)  SpO2: 96 % (11/22/23 0831) Vital Signs (24h Range):  Temp:  [97.1 °F (36.2 °C)-98.8 °F (37.1 °C)] 97.9 °F (36.6 °C)  Pulse:  [72-81] 72  Resp:  [18-20] 20  SpO2:   [93 %-98 %] 96 %  BP: ()/(59-75) 111/65     Weight: (!) 149.4 kg (329 lb 5.9 oz)  Body mass index is 56.54 kg/m².    Estimated Creatinine Clearance: 43.3 mL/min (A) (based on SCr of 2.3 mg/dL (H)).     Physical Exam  Vitals and nursing note reviewed.   Constitutional:       Appearance: Normal appearance.   HENT:      Head: Normocephalic and atraumatic.   Neurological:      General: No focal deficit present.      Mental Status: She is alert and oriented to person, place, and time.   Psychiatric:         Mood and Affect: Mood normal.         Behavior: Behavior normal.         Thought Content: Thought content normal.         Judgment: Judgment normal.     Remainder of exam deferred due to current use of bedpan.     Significant Labs: Blood Culture:   Recent Labs   Lab 11/18/23 1945 11/18/23 2005   LABBLOO No Growth to date  No Growth to date  No Growth to date  No Growth to date No Growth to date  No Growth to date  No Growth to date  No Growth to date     BMP:   Recent Labs   Lab 11/22/23  0600   *   *   K 3.2*      CO2 20*   BUN 23*   CREATININE 2.3*   CALCIUM 9.3     CBC:   Recent Labs   Lab 11/21/23  0430   WBC 10.84   HGB 10.4*   HCT 32.0*        Wound Culture:   Recent Labs   Lab 11/19/23  0913   LABAERO STREPTOCOCCUS AGALACTIAE (GROUP B)  Moderate  Beta-hemolytic streptococci are routinely susceptible to   penicillins,cephalosporins and carbapenems.  *       Significant Imaging: I have reviewed all pertinent imaging results/findings within the past 24 hours.

## 2023-11-22 NOTE — SUBJECTIVE & OBJECTIVE
Interval History: feeling better.    Review of Systems   HENT:  Negative for ear discharge and ear pain.    Eyes:  Negative for discharge and itching.   Endocrine: Negative for cold intolerance and heat intolerance.   Neurological:  Negative for seizures and syncope.     Objective:     Vital Signs (Most Recent):  Temp: 98.2 °F (36.8 °C) (11/22/23 1209)  Pulse: 77 (11/22/23 1209)  Resp: 20 (11/22/23 1209)  BP: 130/77 (11/22/23 1209)  SpO2: 96 % (11/22/23 1209) Vital Signs (24h Range):  Temp:  [97.4 °F (36.3 °C)-98.8 °F (37.1 °C)] 98.2 °F (36.8 °C)  Pulse:  [72-81] 77  Resp:  [18-20] 20  SpO2:  [93 %-98 %] 96 %  BP: (108-131)/(59-77) 130/77     Weight: (!) 149.4 kg (329 lb 5.9 oz)  Body mass index is 56.54 kg/m².    Intake/Output Summary (Last 24 hours) at 11/22/2023 1353  Last data filed at 11/22/2023 1317  Gross per 24 hour   Intake 120 ml   Output 2800 ml   Net -2680 ml           Physical Exam  Constitutional:       General: She is not in acute distress.     Appearance: Normal appearance.   HENT:      Head: Normocephalic and atraumatic.      Mouth/Throat:      Mouth: Mucous membranes are moist.   Eyes:      Pupils: Pupils are equal, round, and reactive to light.   Cardiovascular:      Rate and Rhythm: Normal rate.   Pulmonary:      Effort: Pulmonary effort is normal. No respiratory distress.   Abdominal:      General: Abdomen is flat. There is no distension.      Palpations: Abdomen is soft.   Musculoskeletal:         General: Normal range of motion.      Cervical back: Normal range of motion.      Comments: Right groin dressing in place, clean and dry   Skin:     General: Skin is warm and dry.   Neurological:      General: No focal deficit present.      Mental Status: She is alert and oriented to person, place, and time.   Psychiatric:         Mood and Affect: Mood normal.         Behavior: Behavior normal.             Significant Labs: All pertinent labs within the past 24 hours have been reviewed.  BMP:   Recent  Labs   Lab 11/22/23  0600   *   *   K 3.2*      CO2 20*   BUN 23*   CREATININE 2.3*   CALCIUM 9.3       CBC:   Recent Labs   Lab 11/21/23  0430   WBC 10.84   HGB 10.4*   HCT 32.0*            Significant Imaging: I have reviewed all pertinent imaging results/findings within the past 24 hours.

## 2023-11-22 NOTE — CONSULTS
Local wound care to right groin debridement site  Photodocumentation    Right groin- Two incisions with penrose drain threaded under skin bridge connecting two openings. Wound 14 cm(L) 1 cm(W) 6 cm(D). Packing removed saturated with serosanguineous drainage without foul odor. Periwound skin macerated. ABD pad becoming saturated within 12 hours.   Treatment:  Cleansed wound with Vashe. Applied 3M Cavilon No Sting Film Barrier to periwound skin. Filled lower wound with Kerlix roll saturated with Vashe. Filled upper incision with 4x4 gauze saturated with Vashe. Covered with ABD pad and secured at mons pubis with Medipore tape. Used mesh pants to secure dressing.   Nursing to change dressing daily and change ABD pad over packing every 12 hours.   Treatment plan discussed with patient and nursing.   Discussed discharge planning with patient. Will arrange for family to assist HH with dressing changes when discharged home.

## 2023-11-23 LAB
ANION GAP SERPL CALC-SCNC: 10 MMOL/L (ref 8–16)
BACTERIA BLD CULT: NORMAL
BACTERIA BLD CULT: NORMAL
BUN SERPL-MCNC: 20 MG/DL (ref 6–20)
CALCIUM SERPL-MCNC: 8.7 MG/DL (ref 8.7–10.5)
CHLORIDE SERPL-SCNC: 106 MMOL/L (ref 95–110)
CO2 SERPL-SCNC: 20 MMOL/L (ref 23–29)
CREAT SERPL-MCNC: 2.1 MG/DL (ref 0.5–1.4)
EST. GFR  (NO RACE VARIABLE): 28 ML/MIN/1.73 M^2
GLUCOSE SERPL-MCNC: 301 MG/DL (ref 70–110)
POCT GLUCOSE: 188 MG/DL (ref 70–110)
POCT GLUCOSE: 205 MG/DL (ref 70–110)
POCT GLUCOSE: 236 MG/DL (ref 70–110)
POCT GLUCOSE: 336 MG/DL (ref 70–110)
POTASSIUM SERPL-SCNC: 3.5 MMOL/L (ref 3.5–5.1)
SODIUM SERPL-SCNC: 136 MMOL/L (ref 136–145)

## 2023-11-23 PROCEDURE — 63600175 PHARM REV CODE 636 W HCPCS: Performed by: HOSPITALIST

## 2023-11-23 PROCEDURE — 25000003 PHARM REV CODE 250: Performed by: HOSPITALIST

## 2023-11-23 PROCEDURE — 11000001 HC ACUTE MED/SURG PRIVATE ROOM

## 2023-11-23 PROCEDURE — 63600175 PHARM REV CODE 636 W HCPCS: Performed by: STUDENT IN AN ORGANIZED HEALTH CARE EDUCATION/TRAINING PROGRAM

## 2023-11-23 PROCEDURE — 36415 COLL VENOUS BLD VENIPUNCTURE: CPT | Performed by: HOSPITALIST

## 2023-11-23 PROCEDURE — 25000003 PHARM REV CODE 250: Performed by: INTERNAL MEDICINE

## 2023-11-23 PROCEDURE — 97116 GAIT TRAINING THERAPY: CPT | Mod: CQ

## 2023-11-23 PROCEDURE — 80048 BASIC METABOLIC PNL TOTAL CA: CPT | Performed by: HOSPITALIST

## 2023-11-23 PROCEDURE — 63600175 PHARM REV CODE 636 W HCPCS: Performed by: INTERNAL MEDICINE

## 2023-11-23 PROCEDURE — 97110 THERAPEUTIC EXERCISES: CPT | Mod: CQ

## 2023-11-23 PROCEDURE — 25000003 PHARM REV CODE 250: Performed by: STUDENT IN AN ORGANIZED HEALTH CARE EDUCATION/TRAINING PROGRAM

## 2023-11-23 PROCEDURE — A4216 STERILE WATER/SALINE, 10 ML: HCPCS | Performed by: HOSPITALIST

## 2023-11-23 RX ORDER — INSULIN ASPART 100 [IU]/ML
12 INJECTION, SOLUTION INTRAVENOUS; SUBCUTANEOUS
Status: DISCONTINUED | OUTPATIENT
Start: 2023-11-23 | End: 2023-11-24 | Stop reason: HOSPADM

## 2023-11-23 RX ADMIN — GABAPENTIN 300 MG: 300 CAPSULE ORAL at 05:11

## 2023-11-23 RX ADMIN — ACETAMINOPHEN 650 MG: 325 TABLET ORAL at 08:11

## 2023-11-23 RX ADMIN — ACETAMINOPHEN 650 MG: 325 TABLET ORAL at 10:11

## 2023-11-23 RX ADMIN — INSULIN ASPART 10 UNITS: 100 INJECTION, SOLUTION INTRAVENOUS; SUBCUTANEOUS at 12:11

## 2023-11-23 RX ADMIN — INSULIN ASPART 4 UNITS: 100 INJECTION, SOLUTION INTRAVENOUS; SUBCUTANEOUS at 12:11

## 2023-11-23 RX ADMIN — GABAPENTIN 300 MG: 300 CAPSULE ORAL at 10:11

## 2023-11-23 RX ADMIN — Medication 10 ML: at 06:11

## 2023-11-23 RX ADMIN — METHOCARBAMOL 500 MG: 500 TABLET ORAL at 05:11

## 2023-11-23 RX ADMIN — HEPARIN SODIUM 5000 UNITS: 5000 INJECTION INTRAVENOUS; SUBCUTANEOUS at 01:11

## 2023-11-23 RX ADMIN — POLYETHYLENE GLYCOL 3350 17 G: 17 POWDER, FOR SOLUTION ORAL at 08:11

## 2023-11-23 RX ADMIN — GABAPENTIN 300 MG: 300 CAPSULE ORAL at 08:11

## 2023-11-23 RX ADMIN — METHOCARBAMOL 500 MG: 500 TABLET ORAL at 10:11

## 2023-11-23 RX ADMIN — HEPARIN SODIUM 5000 UNITS: 5000 INJECTION INTRAVENOUS; SUBCUTANEOUS at 06:11

## 2023-11-23 RX ADMIN — CEFTRIAXONE 2 G: 2 INJECTION, POWDER, FOR SOLUTION INTRAMUSCULAR; INTRAVENOUS at 01:11

## 2023-11-23 RX ADMIN — METHOCARBAMOL 500 MG: 500 TABLET ORAL at 12:11

## 2023-11-23 RX ADMIN — METHOCARBAMOL 500 MG: 500 TABLET ORAL at 08:11

## 2023-11-23 RX ADMIN — Medication 10 ML: at 12:11

## 2023-11-23 RX ADMIN — INSULIN ASPART 2 UNITS: 100 INJECTION, SOLUTION INTRAVENOUS; SUBCUTANEOUS at 08:11

## 2023-11-23 RX ADMIN — MUPIROCIN: 20 OINTMENT TOPICAL at 08:11

## 2023-11-23 RX ADMIN — HEPARIN SODIUM 5000 UNITS: 5000 INJECTION INTRAVENOUS; SUBCUTANEOUS at 10:11

## 2023-11-23 RX ADMIN — INSULIN ASPART 10 UNITS: 100 INJECTION, SOLUTION INTRAVENOUS; SUBCUTANEOUS at 08:11

## 2023-11-23 RX ADMIN — INSULIN ASPART 12 UNITS: 100 INJECTION, SOLUTION INTRAVENOUS; SUBCUTANEOUS at 05:11

## 2023-11-23 RX ADMIN — MUPIROCIN: 20 OINTMENT TOPICAL at 10:11

## 2023-11-23 RX ADMIN — INSULIN DETEMIR 30 UNITS: 100 INJECTION, SOLUTION SUBCUTANEOUS at 08:11

## 2023-11-23 NOTE — SUBJECTIVE & OBJECTIVE
Interval History: some pain to groin area, but no other complaints.    Review of Systems   HENT:  Negative for ear discharge and ear pain.    Eyes:  Negative for discharge and itching.   Endocrine: Negative for cold intolerance and heat intolerance.   Neurological:  Negative for seizures and syncope.     Objective:     Vital Signs (Most Recent):  Temp: 98.9 °F (37.2 °C) (11/23/23 1151)  Pulse: 78 (11/23/23 1151)  Resp: 18 (11/23/23 1151)  BP: 132/68 (11/23/23 1151)  SpO2: 98 % (11/23/23 1151) Vital Signs (24h Range):  Temp:  [98.1 °F (36.7 °C)-98.9 °F (37.2 °C)] 98.9 °F (37.2 °C)  Pulse:  [75-84] 78  Resp:  [16-20] 18  SpO2:  [95 %-98 %] 98 %  BP: (113-140)/(58-85) 132/68     Weight: (!) 149.4 kg (329 lb 5.9 oz)  Body mass index is 56.54 kg/m².    Intake/Output Summary (Last 24 hours) at 11/23/2023 1201  Last data filed at 11/23/2023 0830  Gross per 24 hour   Intake 480 ml   Output 4201 ml   Net -3721 ml           Physical Exam  Constitutional:       General: She is not in acute distress.     Appearance: Normal appearance.   HENT:      Head: Normocephalic and atraumatic.      Mouth/Throat:      Mouth: Mucous membranes are moist.   Eyes:      Pupils: Pupils are equal, round, and reactive to light.   Cardiovascular:      Rate and Rhythm: Normal rate.   Pulmonary:      Effort: Pulmonary effort is normal. No respiratory distress.   Abdominal:      General: Abdomen is flat. There is no distension.      Palpations: Abdomen is soft.   Musculoskeletal:         General: Normal range of motion.      Cervical back: Normal range of motion.      Comments: Right groin dressing in place, clean and dry   Skin:     General: Skin is warm and dry.   Neurological:      General: No focal deficit present.      Mental Status: She is alert and oriented to person, place, and time.   Psychiatric:         Mood and Affect: Mood normal.         Behavior: Behavior normal.             Significant Labs: All pertinent labs within the past 24 hours  "have been reviewed.  BMP:   Recent Labs   Lab 11/23/23  0941   *      K 3.5      CO2 20*   BUN 20   CREATININE 2.1*   CALCIUM 8.7       CBC:   No results for input(s): "WBC", "HGB", "HCT", "PLT" in the last 48 hours.      Significant Imaging: I have reviewed all pertinent imaging results/findings within the past 24 hours.  "

## 2023-11-23 NOTE — PLAN OF CARE
Problem: Adult Inpatient Plan of Care  Goal: Plan of Care Review  Outcome: Ongoing, Progressing  Goal: Patient-Specific Goal (Individualized)  Outcome: Ongoing, Progressing  Goal: Absence of Hospital-Acquired Illness or Injury  Outcome: Ongoing, Progressing  Goal: Optimal Comfort and Wellbeing  Outcome: Ongoing, Progressing  Goal: Readiness for Transition of Care  Outcome: Ongoing, Progressing     Problem: Bariatric Environmental Safety  Goal: Safety Maintained with Care  Outcome: Ongoing, Progressing     Problem: Adjustment to Illness (Sepsis/Septic Shock)  Goal: Optimal Coping  Outcome: Ongoing, Progressing     Problem: Bleeding (Sepsis/Septic Shock)  Goal: Absence of Bleeding  Outcome: Ongoing, Progressing     Problem: Glycemic Control Impaired (Sepsis/Septic Shock)  Goal: Blood Glucose Level Within Desired Range  Outcome: Ongoing, Progressing     Problem: Infection Progression (Sepsis/Septic Shock)  Goal: Absence of Infection Signs and Symptoms  Outcome: Ongoing, Progressing     Problem: Nutrition Impaired (Sepsis/Septic Shock)  Goal: Optimal Nutrition Intake  Outcome: Ongoing, Progressing     Problem: Diabetes Comorbidity  Goal: Blood Glucose Level Within Targeted Range  Outcome: Ongoing, Progressing     Problem: Fluid and Electrolyte Imbalance (Acute Kidney Injury/Impairment)  Goal: Fluid and Electrolyte Balance  Outcome: Ongoing, Progressing     Problem: Oral Intake Inadequate (Acute Kidney Injury/Impairment)  Goal: Optimal Nutrition Intake  Outcome: Ongoing, Progressing     Problem: Renal Function Impairment (Acute Kidney Injury/Impairment)  Goal: Effective Renal Function  Outcome: Ongoing, Progressing     Problem: Infection  Goal: Absence of Infection Signs and Symptoms  Outcome: Ongoing, Progressing

## 2023-11-23 NOTE — ASSESSMENT & PLAN NOTE
Patient with acute kidney injury/acute renal failure likely due to acute tubular necrosis caused by probable combination of infection and antibiotics.  DAVIDE is currently worsening. Baseline creatinine  1  - Labs reviewed- Renal function/electrolytes with Estimated Creatinine Clearance: 47.4 mL/min (A) (based on SCr of 2.1 mg/dL (H)). according to latest data. Monitor urine output and serial BMP and adjust therapy as needed. Avoid nephrotoxins and renally dose meds for GFR listed above.  Stopped Vanc.  IVF hydration.  Creat seems to have peaked and slightly improved today.  Should continue to improve.

## 2023-11-23 NOTE — PT/OT/SLP PROGRESS
Physical Therapy Treatment    Patient Name:  Ashanti Thompson   MRN:  [id number]    Recommendations:     Discharge Recommendations: Low Intensity Therapy  Discharge Equipment Recommendations:  (Wide BSC and Wide RW)  Barriers to discharge:  not functioning at baseline    Assessment:     Ashanti Thompson is a 48 y.o. female admitted with a medical diagnosis of Abscess of right groin.  She presents with the following impairments/functional limitations: weakness, impaired endurance, impaired functional mobility, gait instability, impaired balance, decreased coordination, decreased upper extremity function, decreased lower extremity function, decreased safety awareness, pain, impaired skin, edema.    Rehab Prognosis: Good; patient would benefit from acute skilled PT services to address these deficits and reach maximum level of function.    Recent Surgery: Procedure(s) (LRB):  DEBRIDEMENT, WOUND RIGHT GROINc (Right) 4 Days Post-Op    Plan:     During this hospitalization, patient to be seen  (3-5x/wk) to address the identified rehab impairments via gait training, therapeutic activities, therapeutic exercises, neuromuscular re-education and progress toward the following goals:    Plan of Care Expires:  12/05/23    Subjective     Chief Complaint: pain  Patient/Family Comments/goals: Pt agreed to sit UIC post treatment.  Pain/Comfort:  Pain Rating 1:  (yes, pt did not rate)  Location - Side 1: Right  Location 1: groin  Pain Addressed 1: Pre-medicate for activity, Reposition, Distraction, Cessation of Activity, Nurse notified  Pain Rating Post-Intervention 1:  (pt did not rate)      Objective:     Communicated with nurse Stoll prior to session.  Patient found HOB elevated with diaz catheter, telemetry, peripheral IV, SCD, Daughter present upon PT entry to room.     General Precautions: Standard, fall, diabetic  Orthopedic Precautions: N/A  Braces: N/A  Respiratory Status: Room air     Functional Mobility:  Bed Mobility:      Scooting: anterior scoot to EOB with stand by assistance  Supine to Sit: stand by assistance with HOB elevated  Transfers:   Gait belt donned prior OOB activity  Sit to Stand: from EOB with minimum assistance with Bariatric rolling walker  Gait: Pt ambulated 40 ft with CGA using Bariatric RW. Pt with decreased justine/step length, pain, weakness; v/t cues for , AD management, increase step length; limited gait training 2* pt with c/o feeling dizzy and lightheaded , /70. Pt reported feeling better after resting  Balance: Fair+ Sitting; Fair Standing      AM-PAC 6 CLICK MOBILITY  Turning over in bed (including adjusting bedclothes, sheets and blankets)?: 3  Sitting down on and standing up from a chair with arms (e.g., wheelchair, bedside commode, etc.): 3  Moving from lying on back to sitting on the side of the bed?: 3  Moving to and from a bed to a chair (including a wheelchair)?: 3  Need to walk in hospital room?: 3  Climbing 3-5 steps with a railing?: 2  Basic Mobility Total Score: 17       Treatment & Education:  Educated pt on benefits of OOB activity and performing BLE ex throughout the day, pt verbalized understanding.    BLE ex in seated 10 reps LAQ, GS; 20 reps AP    Patient left up in chair on cushion seat and pillow with tray table near by, all lines intact, call button in reach, nurse notified, and Daughter present.    GOALS:   Multidisciplinary Problems       Physical Therapy Goals          Problem: Physical Therapy    Goal Priority Disciplines Outcome Goal Variances Interventions   Physical Therapy Goal     PT, PT/OT Ongoing, Progressing     Description: Goals to be met by: 23     Patient will increase functional independence with mobility by performin. Supine to sit with Stand-by Assistance  2. Sit to stand transfer with Supervision  3. Gait  x 50 feet with Supervision using Rolling Walker.   4. Lower extremity exercise program x10 reps per handout, with supervision                          Time Tracking:     PT Received On: 11/23/23  PT Start Time: 1036     PT Stop Time: 1100  PT Total Time (min): 24 min     Billable Minutes: Gait Training 12 min and Therapeutic Exercise 12 min    Treatment Type: Treatment  PT/PTA: PTA     Number of PTA visits since last PT visit: 1 11/23/2023

## 2023-11-23 NOTE — PLAN OF CARE
Problem: Physical Therapy  Goal: Physical Therapy Goal  Description: Goals to be met by: 23     Patient will increase functional independence with mobility by performin. Supine to sit with Stand-by Assistance  2. Sit to stand transfer with Supervision  3. Gait  x 50 feet with Supervision using Rolling Walker.   4. Lower extremity exercise program x10 reps per handout, with supervision    Outcome: Ongoing, Progressing

## 2023-11-23 NOTE — PLAN OF CARE
Problem: Adult Inpatient Plan of Care  Goal: Plan of Care Review  Outcome: Ongoing, Progressing  Flowsheets (Taken 11/22/2023 0815)  Plan of Care Reviewed With: patient  Goal: Patient-Specific Goal (Individualized)  Outcome: Ongoing, Progressing     Problem: Diabetes Comorbidity  Goal: Blood Glucose Level Within Targeted Range  Outcome: Ongoing, Progressing  Intervention: Monitor and Manage Glycemia  Flowsheets (Taken 11/22/2023 0815)  Glycemic Management:   blood glucose monitored   carbohydrate replacement provided   oral hydration promoted   supplemental insulin given     Problem: Adult Inpatient Plan of Care  Goal: Plan of Care Review  Outcome: Ongoing, Progressing  Flowsheets (Taken 11/22/2023 0815)  Plan of Care Reviewed With: patient     Problem: Adult Inpatient Plan of Care  Goal: Plan of Care Review  Outcome: Ongoing, Progressing  Flowsheets (Taken 11/22/2023 0815)  Plan of Care Reviewed With: patient     Problem: Adult Inpatient Plan of Care  Goal: Patient-Specific Goal (Individualized)  Outcome: Ongoing, Progressing     Problem: Adult Inpatient Plan of Care  Goal: Patient-Specific Goal (Individualized)  Outcome: Ongoing, Progressing     Problem: Diabetes Comorbidity  Goal: Blood Glucose Level Within Targeted Range  Outcome: Ongoing, Progressing  Intervention: Monitor and Manage Glycemia  Flowsheets (Taken 11/22/2023 0815)  Glycemic Management:   blood glucose monitored   carbohydrate replacement provided   oral hydration promoted   supplemental insulin given     Problem: Diabetes Comorbidity  Goal: Blood Glucose Level Within Targeted Range  Outcome: Ongoing, Progressing     Problem: Diabetes Comorbidity  Goal: Blood Glucose Level Within Targeted Range  Intervention: Monitor and Manage Glycemia  Flowsheets (Taken 11/22/2023 0815)  Glycemic Management:   blood glucose monitored   carbohydrate replacement provided   oral hydration promoted   supplemental insulin given     Problem: Diabetes  Comorbidity  Goal: Blood Glucose Level Within Targeted Range  Intervention: Monitor and Manage Glycemia  Flowsheets (Taken 11/22/2023 0815)  Glycemic Management:   blood glucose monitored   carbohydrate replacement provided   oral hydration promoted   supplemental insulin given

## 2023-11-23 NOTE — ASSESSMENT & PLAN NOTE
CT abdomen and pelvis showed (1) edematous/inflammatory process involving the inferior medial right groin and perineum and extending posteriorly to the level of the inferior right buttock, there is appearance of edema and inflammation and areas of greater induration inferiorly, (2) there is air density within the soft tissue (possibly of gas producing infectious organisms should be considered).   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  S/P I&D and debridement by surgery.  Continue wound care.  Wound cultures growing group B Strep.  Patient now with worsening Creat.  Stopped Vanc and Clinda.  Continued Zosyn and consult ID for ABX course recommendations.  Home on oral ABX's and wound care once Creat starts improving.  Some improvement in Creat.  Hopefully home tomorrow with  wound care.

## 2023-11-23 NOTE — PLAN OF CARE
General Surgery Progress Note:     Patient seen and examined this am. Penrose drain removed at bedside. Continue packing per wound care. Remainder of care per primary team.     Yuki Johnson MD  Pager: [phone]  General Surgery PGY-III  [facility]

## 2023-11-23 NOTE — NURSING
[facility] [place]  Nurses Note -- 4 Eyes      11/23/2023       Skin assessed on: Q Shift      [x] No Pressure Injuries Present    []Prevention Measures Documented    [] Yes LDA  for Pressure Injury Previously documented     [] Yes New Pressure Injury Discovered   [] LDA for New Pressure Injury Added      Attending RN:  Dodie Meadwos RN     Second RN:  MIGUEL Arnett

## 2023-11-23 NOTE — PROGRESS NOTES
[facility] Medicine  Progress Note    Patient Name: Ashanti Thompson  MRN: [id number]  Patient Class: IP- Inpatient   Admission Date: 11/18/2023  Length of Stay: 5 days  Attending Physician: Rajiv Jorge MD  Primary Care Provider: Galdino Angulo MD        Subjective:     Principal Problem:Abscess of right groin        HPI:  This is a 48-year-old female with a past medical history of type 2 diabetes, hyperlipidemia and morbid obesity who presents with groin pain.    Patient developed right groin swelling and pain that started about 3 days prior to presentation.  She denies noticing any discharge or pus.  Additional symptoms include fevers, chills and diarrhea.    In the ED, the patient was hemodynamically stable.  CT abdomen and pelvis showed (1) edematous/inflammatory process involving the inferior medial right groin and perineum and extending posteriorly to the level of the inferior right buttock, there is appearance of edema and inflammation and areas of greater induration inferiorly, (2) there is air density within the soft tissue (possibly of gas producing infectious organisms should be considered). Patient was given 1.6 L of NS, Zosyn, clindamycin, and morphine 8 mg IV.  Surgery was consulted & notified by ED provider.  She was admitted for further management.    Overview/Hospital Course:  49 y/o female with past medical history of type 2 diabetes, hyperlipidemia and morbid obesity who presents with groin pain.  CT showing   Edematous/inflammatory process involving the inferior medial right groin and perineum and extending posteriorly to the level of the inferior right buttock, there is appearance of edema and inflammation and areas of greater induration inferiorly.  Along the aforementioned distribution of abnormality there is air density within the soft tissues possibly of gas producing infectious organisms should be considered.   Surgery was consulted and patient had urgent I&D and  drain placement.  Started on broad spectrum ABX's.  Wound Care consulted.  DAVIDE and Vanc stopped.  Started on IVF's.  ID consulted for ABX recommendations.    Interval History: some pain to groin area, but no other complaints.    Review of Systems   HENT:  Negative for ear discharge and ear pain.    Eyes:  Negative for discharge and itching.   Endocrine: Negative for cold intolerance and heat intolerance.   Neurological:  Negative for seizures and syncope.     Objective:     Vital Signs (Most Recent):  Temp: 98.9 °F (37.2 °C) (11/23/23 1151)  Pulse: 78 (11/23/23 1151)  Resp: 18 (11/23/23 1151)  BP: 132/68 (11/23/23 1151)  SpO2: 98 % (11/23/23 1151) Vital Signs (24h Range):  Temp:  [98.1 °F (36.7 °C)-98.9 °F (37.2 °C)] 98.9 °F (37.2 °C)  Pulse:  [75-84] 78  Resp:  [16-20] 18  SpO2:  [95 %-98 %] 98 %  BP: (113-140)/(58-85) 132/68     Weight: (!) 149.4 kg (329 lb 5.9 oz)  Body mass index is 56.54 kg/m².    Intake/Output Summary (Last 24 hours) at 11/23/2023 1201  Last data filed at 11/23/2023 0830  Gross per 24 hour   Intake 480 ml   Output 4201 ml   Net -3721 ml           Physical Exam  Constitutional:       General: She is not in acute distress.     Appearance: Normal appearance.   HENT:      Head: Normocephalic and atraumatic.      Mouth/Throat:      Mouth: Mucous membranes are moist.   Eyes:      Pupils: Pupils are equal, round, and reactive to light.   Cardiovascular:      Rate and Rhythm: Normal rate.   Pulmonary:      Effort: Pulmonary effort is normal. No respiratory distress.   Abdominal:      General: Abdomen is flat. There is no distension.      Palpations: Abdomen is soft.   Musculoskeletal:         General: Normal range of motion.      Cervical back: Normal range of motion.      Comments: Right groin dressing in place, clean and dry   Skin:     General: Skin is warm and dry.   Neurological:      General: No focal deficit present.      Mental Status: She is alert and oriented to person, place, and time.  "  Psychiatric:         Mood and Affect: Mood normal.         Behavior: Behavior normal.             Significant Labs: All pertinent labs within the past 24 hours have been reviewed.  BMP:   Recent Labs   Lab 11/23/23  0941   *      K 3.5      CO2 20*   BUN 20   CREATININE 2.1*   CALCIUM 8.7       CBC:   No results for input(s): "WBC", "HGB", "HCT", "PLT" in the last 48 hours.      Significant Imaging: I have reviewed all pertinent imaging results/findings within the past 24 hours.    Assessment/Plan:      * Abscess of right groin  CT abdomen and pelvis showed (1) edematous/inflammatory process involving the inferior medial right groin and perineum and extending posteriorly to the level of the inferior right buttock, there is appearance of edema and inflammation and areas of greater induration inferiorly, (2) there is air density within the soft tissue (possibly of gas producing infectious organisms should be considered).   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  S/P I&D and debridement by surgery.  Continue wound care.  Wound cultures growing group B Strep.  Patient now with worsening Creat.  Stopped Vanc and Clinda.  Continued Zosyn and consult ID for ABX course recommendations.  Home on oral ABX's and wound care once Creat starts improving.  Some improvement in Creat.  Hopefully home tomorrow with  wound care.      DAVIDE (acute kidney injury)  Patient with acute kidney injury/acute renal failure likely due to acute tubular necrosis caused by probable combination of infection and antibiotics.  DAVIDE is currently worsening. Baseline creatinine  1  - Labs reviewed- Renal function/electrolytes with Estimated Creatinine Clearance: 47.4 mL/min (A) (based on SCr of 2.1 mg/dL (H)). according to latest data. Monitor urine output and serial BMP and adjust therapy as needed. Avoid nephrotoxins and renally dose meds for GFR listed " "above.  Stopped Vanc.  IVF hydration.  Creat seems to have peaked and slightly improved today.  Should continue to improve.    Morbid obesity  Body mass index is 56.54 kg/m². Morbid obesity complicates all aspects of disease management from diagnostic modalities to treatment. Weight loss encouraged and health benefits explained to patient.         Type 2 diabetes mellitus, without long-term current use of insulin  Glycemic protocol   LDSS      Sepsis  This patient does have evidence of infective focus  My overall impression is sepsis.  Source: Skin and Soft Tissue (location right groin)  Antibiotics given-   Antibiotics (72h ago, onward)      Start     Stop Route Frequency Ordered    11/22/23 1030  cefTRIAXone (ROCEPHIN) 2 g in dextrose 5 % in water (D5W) 100 mL IVPB (MB+)         -- IV Every 24 hours (non-standard times) 11/22/23 0916    11/19/23 1100  mupirocin 2 % ointment         11/24/23 0859 Nasl 2 times daily 11/19/23 0951          Latest lactate reviewed-  No results for input(s): "LACTATE" in the last 72 hours.    Sepsis secondary to group B Strep skin infection.  ABX's as above.      VTE Risk Mitigation (From admission, onward)           Ordered     heparin (porcine) injection 5,000 Units  Every 8 hours         11/19/23 0121     IP VTE HIGH RISK PATIENT  Once         11/19/23 0121     Place sequential compression device  Until discontinued         11/19/23 0121                    Discharge Planning   FORREST: 11/24/2023     Code Status: Full Code   Is the patient medically ready for discharge?:     Reason for patient still in hospital (select all that apply): Patient trending condition                     Rajiv Jorge MD  Department of Hospital Medicine   [facility] Surg    "

## 2023-11-23 NOTE — NURSING
[facility]  [place] -- 4 [place]      11/23/2023       Skin assessed on: Q Shift      [x] No Pressure Injuries Present    []Prevention Measures Documented    [] Yes LDA  for Pressure Injury Previously documented     [] Yes New Pressure Injury Discovered   [] LDA for New Pressure Injury Added      Attending RN:  Hope Goff RN     Second RN:  MIGUEL Stoll

## 2023-11-24 ENCOUNTER — TELEPHONE (OUTPATIENT)
Dept: UROLOGY | Facility: CLINIC | Age: 48
End: 2023-11-24
Payer: MEDICAID

## 2023-11-24 VITALS
HEIGHT: 64 IN | RESPIRATION RATE: 18 BRPM | DIASTOLIC BLOOD PRESSURE: 67 MMHG | WEIGHT: 293 LBS | BODY MASS INDEX: 50.02 KG/M2 | OXYGEN SATURATION: 99 % | HEART RATE: 78 BPM | SYSTOLIC BLOOD PRESSURE: 128 MMHG | TEMPERATURE: 99 F

## 2023-11-24 PROBLEM — A41.9 SEPSIS: Status: RESOLVED | Noted: 2023-11-19 | Resolved: 2023-11-24

## 2023-11-24 LAB
ANION GAP SERPL CALC-SCNC: 9 MMOL/L (ref 8–16)
BUN SERPL-MCNC: 18 MG/DL (ref 6–20)
CALCIUM SERPL-MCNC: 8.8 MG/DL (ref 8.7–10.5)
CHLORIDE SERPL-SCNC: 107 MMOL/L (ref 95–110)
CO2 SERPL-SCNC: 21 MMOL/L (ref 23–29)
CREAT SERPL-MCNC: 1.8 MG/DL (ref 0.5–1.4)
EST. GFR  (NO RACE VARIABLE): 34 ML/MIN/1.73 M^2
GLUCOSE SERPL-MCNC: 163 MG/DL (ref 70–110)
POCT GLUCOSE: 138 MG/DL (ref 70–110)
POCT GLUCOSE: 163 MG/DL (ref 70–110)
POCT GLUCOSE: 199 MG/DL (ref 70–110)
POTASSIUM SERPL-SCNC: 3 MMOL/L (ref 3.5–5.1)
SODIUM SERPL-SCNC: 137 MMOL/L (ref 136–145)

## 2023-11-24 PROCEDURE — A4216 STERILE WATER/SALINE, 10 ML: HCPCS | Performed by: HOSPITALIST

## 2023-11-24 PROCEDURE — 25000003 PHARM REV CODE 250: Performed by: HOSPITALIST

## 2023-11-24 PROCEDURE — 63600175 PHARM REV CODE 636 W HCPCS: Performed by: STUDENT IN AN ORGANIZED HEALTH CARE EDUCATION/TRAINING PROGRAM

## 2023-11-24 PROCEDURE — 80048 BASIC METABOLIC PNL TOTAL CA: CPT | Performed by: HOSPITALIST

## 2023-11-24 PROCEDURE — 25000003 PHARM REV CODE 250: Performed by: STUDENT IN AN ORGANIZED HEALTH CARE EDUCATION/TRAINING PROGRAM

## 2023-11-24 PROCEDURE — 63600175 PHARM REV CODE 636 W HCPCS: Performed by: INTERNAL MEDICINE

## 2023-11-24 PROCEDURE — 36415 COLL VENOUS BLD VENIPUNCTURE: CPT | Performed by: HOSPITALIST

## 2023-11-24 PROCEDURE — 25000003 PHARM REV CODE 250: Performed by: INTERNAL MEDICINE

## 2023-11-24 PROCEDURE — 97116 GAIT TRAINING THERAPY: CPT | Mod: CQ

## 2023-11-24 PROCEDURE — 97530 THERAPEUTIC ACTIVITIES: CPT | Mod: CQ

## 2023-11-24 RX ORDER — ACETAMINOPHEN 325 MG/1
650 TABLET ORAL EVERY 4 HOURS PRN
Refills: 0
Start: 2023-11-24

## 2023-11-24 RX ORDER — GABAPENTIN 300 MG/1
300 CAPSULE ORAL 3 TIMES DAILY
Qty: 90 CAPSULE | Refills: 11 | Status: SHIPPED | OUTPATIENT
Start: 2023-11-24 | End: 2024-11-23

## 2023-11-24 RX ORDER — CEFADROXIL 500 MG/1
500 CAPSULE ORAL EVERY 12 HOURS
Qty: 14 CAPSULE | Refills: 0 | Status: SHIPPED | OUTPATIENT
Start: 2023-11-24 | End: 2023-12-01

## 2023-11-24 RX ORDER — HYDROCODONE BITARTRATE AND ACETAMINOPHEN 5; 325 MG/1; MG/1
1 TABLET ORAL EVERY 8 HOURS PRN
Qty: 20 TABLET | Refills: 0 | Status: SHIPPED | OUTPATIENT
Start: 2023-11-24

## 2023-11-24 RX ORDER — CATHETER MALE,EXTERNAL 41MM
1 EACH MISCELLANEOUS CONTINUOUS
Qty: 1 KIT | Refills: 0 | Status: SHIPPED | OUTPATIENT
Start: 2023-11-24

## 2023-11-24 RX ADMIN — Medication 10 ML: at 12:11

## 2023-11-24 RX ADMIN — METHOCARBAMOL 500 MG: 500 TABLET ORAL at 09:11

## 2023-11-24 RX ADMIN — METHOCARBAMOL 500 MG: 500 TABLET ORAL at 12:11

## 2023-11-24 RX ADMIN — HEPARIN SODIUM 5000 UNITS: 5000 INJECTION INTRAVENOUS; SUBCUTANEOUS at 06:11

## 2023-11-24 RX ADMIN — GABAPENTIN 300 MG: 300 CAPSULE ORAL at 09:11

## 2023-11-24 RX ADMIN — INSULIN ASPART 12 UNITS: 100 INJECTION, SOLUTION INTRAVENOUS; SUBCUTANEOUS at 04:11

## 2023-11-24 RX ADMIN — ACETAMINOPHEN 650 MG: 325 TABLET ORAL at 04:11

## 2023-11-24 RX ADMIN — Medication 10 ML: at 06:11

## 2023-11-24 RX ADMIN — CEFTRIAXONE 2 G: 2 INJECTION, POWDER, FOR SOLUTION INTRAMUSCULAR; INTRAVENOUS at 02:11

## 2023-11-24 RX ADMIN — INSULIN ASPART 12 UNITS: 100 INJECTION, SOLUTION INTRAVENOUS; SUBCUTANEOUS at 09:11

## 2023-11-24 RX ADMIN — GABAPENTIN 300 MG: 300 CAPSULE ORAL at 02:11

## 2023-11-24 RX ADMIN — INSULIN ASPART 12 UNITS: 100 INJECTION, SOLUTION INTRAVENOUS; SUBCUTANEOUS at 11:11

## 2023-11-24 RX ADMIN — Medication 10 ML: at 11:11

## 2023-11-24 RX ADMIN — METHOCARBAMOL 500 MG: 500 TABLET ORAL at 04:11

## 2023-11-24 NOTE — PLAN OF CARE
[facility] - Med Surg  Discharge Final Note    All CM needs met. Instructed pt to follow up with pcp within 1 week, clinic closed today for holiday. Pt to dc with emily and will follow up with gen surgery, appointment scheduled and listed on AVS.     Unable to set home health due to no available in network provider at this time. Pt scheduled with [facility] out patient wound care clinic and listed on AVS. Discharge summary, progress notes and H&P faxed to [facility] wound clinic ([phone]).  Spoke with pt at bedside, stated her sister agreeable for wound care teaching and will assist with dressing changes at home.     Placed call to [facility] wound care clinic, ([phone]) left detailed voice message. Ambulatory referral faxed to [facility] wound [facility] as option closer to pt home. Pt declined snf.     Per Cole with [facility], approved to pull RW, delivered to pt at bedside. Pt declined bsc at this time.  Primary Care Provider: Galdino Angulo MD    Expected Discharge Date: 11/24/2023    Final Discharge Note (most recent)       Final Note - 11/24/23 1427          Final Note    Assessment Type Final Discharge Note (P)      Anticipated Discharge Disposition Home or Self Care (P)      Hospital Resources/Appts/Education Provided Provided patient/caregiver with written discharge plan information (P)         Post-Acute Status    Discharge Delays None known at this time (P)                      Important Message from Medicare             Contact Info       Galdino Angulo MD   Specialty: Family Medicine   Relationship: PCP - General    [address] [facility] [address]   Phone: [phone]       Next Steps: Schedule an appointment as soon as possible for a visit    Instructions: Call on Monday to schedule hospital follow up appointment    [facility]   Specialty: Wound Care    [address] [place] [address]   Phone: [phone]       Next Steps: Go on 11/28/2023    Instructions: Wound care clinic  appointment Tuesday @ 1:00pm   Direct Phone: [phone]    [facility]   Specialty: Inpatient Rehabilitation Facility    [address] [facility] [place] [address]   Phone: [phone]       Next Steps: Call    Instructions: [facility] [phone].

## 2023-11-24 NOTE — NURSING
[facility]  Nurses Note -- 4 Eyes      11/23/2023       Skin assessed on: Q Shift      [x] No Pressure Injuries Present    [x]Prevention Measures Documented    [] Yes LDA  for Pressure Injury Previously documented     [] Yes New Pressure Injury Discovered   [] LDA for New Pressure Injury Added      Attending RN:  Shelbie De La Vega RN     Second RN:  MIGUEL Stoll

## 2023-11-24 NOTE — PLAN OF CARE
Plan for patient discharge with home health nurse for wound care.     Multiple referrals sent via care port for review.   CM to continue to follow up.

## 2023-11-24 NOTE — PLAN OF CARE
Problem: Physical Therapy  Goal: Physical Therapy Goal  Description: Goals to be met by: 23     Patient will increase functional independence with mobility by performin. Supine to sit with Stand-by Assistance  2. Sit to stand transfer with Supervision  3. Gait  x 50 feet with Supervision using Rolling Walker.   4. Lower extremity exercise program x10 reps per handout, with supervision    Outcome: Ongoing, Progressing       Pt progressed in ambulation today ambulating ~100ft using bariatric RW and CGA.

## 2023-11-24 NOTE — NURSING
[facility]  Nurses Note -- 4 Eyes      11/24/2023       Skin assessed on: Q Shift      [x] No Pressure Injuries Present    []Prevention Measures Documented    [] Yes LDA  for Pressure Injury Previously documented     [] Yes New Pressure Injury Discovered   [] LDA for New Pressure Injury Added      Attending RN:  Dodie Meadows RN     Second RN:  MIGUEL Morfin

## 2023-11-24 NOTE — DISCHARGE INSTRUCTIONS
WOUND CARE ORDERS  Local wound care to right groin wound daily- Once a day in AM remove packing from incision and cleanse wound and periwound skin with Vashe. Apply 3M Cavilon No Sting Film Barrier to periwound skin. Fill wound with Vashe moistened Kerlix roll and cover with ABD pad. Secure with mesh pants to hold dressing in place.

## 2023-11-24 NOTE — PT/OT/SLP PROGRESS
"Physical Therapy Treatment    Patient Name:  Ashanti Thompson   MRN:  [id number]    Recommendations:     Discharge Recommendations: Low Intensity Therapy  Discharge Equipment Recommendations:  (Wide BSC and Wide RW)  Barriers to discharge: None    Assessment:     Ashanti Thompson is a 48 y.o. female admitted with a medical diagnosis of Abscess of right groin.  She presents with the following impairments/functional limitations: weakness, impaired endurance, impaired functional mobility, gait instability, impaired balance, decreased coordination, decreased upper extremity function, decreased lower extremity function, decreased safety awareness, pain, impaired skin, edema.    Pt progressed in ambulation today ambulating ~100ft using bariatric RW and CGA.    Rehab Prognosis: Good; patient would benefit from acute skilled PT services to address these deficits and reach maximum level of function.    Recent Surgery: Procedure(s) (LRB):  DEBRIDEMENT, WOUND RIGHT GROINc (Right) 5 Days Post-Op    Plan:     During this hospitalization, patient to be seen  (3-5x/wk) to address the identified rehab impairments via gait training, therapeutic activities, therapeutic exercises, neuromuscular re-education and progress toward the following goals:    Plan of Care Expires:  12/05/23    Subjective     Chief Complaint: "I have to have a BM"  Patient/Family Comments/goals: Pt agreed to therapy   Pain/Comfort:  Pain Rating 1: 0/10  Pain Rating Post-Intervention 1: 0/10      Objective:     Communicated with Dodie prior to session.  Patient found HOB elevated with Michael catheter, peripheral IV, SCD upon PT entry to room.     General Precautions: Standard, fall, diabetic  Orthopedic Precautions: N/A  Braces: N/A  Respiratory Status: Room air     Functional Mobility: Once EOB, pt stated she felt "whoozy" subsided after a performing purse lip breathing technique.  Bed Mobility:     Rolling Right: stand by assistance  Scooting: stand by assistance to " scoot anteriorly towards EOB  Bridging: stand by assistance to use the bed pan.   Supine to Sit: stand by assistance with HOB elevated  Transfers: Gait belt donned   Sit to Stand:  contact guard assistance with rolling walker  Gait: Pt ambulated ~100ft using bariatric RW and CGA. Pt demo wide GUI, decreased justine, step length, weight shifting during ambulation. Pt required verbal cueing for postural control, RW management, and safety awareness.  Balance: Fair+ Sitting; Fair Standing       AM-PAC 6 CLICK MOBILITY  Turning over in bed (including adjusting bedclothes, sheets and blankets)?: 3  Sitting down on and standing up from a chair with arms (e.g., wheelchair, bedside commode, etc.): 3  Moving from lying on back to sitting on the side of the bed?: 3  Moving to and from a bed to a chair (including a wheelchair)?: 3  Need to walk in hospital room?: 3  Climbing 3-5 steps with a railing?: 2  Basic Mobility Total Score: 17       Treatment & Education:  Pt educated on importance of OOB activities and LE exercises throughout the day, pt verbalized understanding.    Educated pt on PLB technique, pt able to perform back to therapist.    Patient left up in chair with two pillows and green cushio, with all lines intact, call button in reach, and SW and sister present.    GOALS:   Multidisciplinary Problems       Physical Therapy Goals          Problem: Physical Therapy    Goal Priority Disciplines Outcome Goal Variances Interventions   Physical Therapy Goal     PT, PT/OT Ongoing, Progressing     Description: Goals to be met by: 23     Patient will increase functional independence with mobility by performin. Supine to sit with Stand-by Assistance  2. Sit to stand transfer with Supervision  3. Gait  x 50 feet with Supervision using Rolling Walker.   4. Lower extremity exercise program x10 reps per handout, with supervision                         Time Tracking:     PT Received On: 23  PT Start Time: 4813      PT Stop Time: 1316  PT Total Time (min): 23 min     Billable Minutes: Gait Training 15 and Therapeutic Activity 8    Treatment Type: Treatment  PT/PTA: PTA     Number of PTA visits since last PT visit: 2     11/24/2023

## 2023-11-24 NOTE — TELEPHONE ENCOUNTER
Pt was contacted. Pt numbers are invalid.  ----- Message from Lida Mckeon RN sent at 11/24/2023  1:52 PM CST -----  Regarding: Hospital follow up  Please contact patient to schedule appointment. Patient discharge today. Thank you

## 2023-11-24 NOTE — PLAN OF CARE
Home Health Follow Up:    Out of Network:  [facility]  [facility]  [facility]  [facility]   [facility]     Short Staffed:  [facility]     Closed for Holiday:  [facility]

## 2023-11-24 NOTE — PLAN OF CARE
[facility] Surg    HOME HEALTH ORDERS  FACE TO FACE ENCOUNTER    Patient Name: Ashanti Thompson  YOB: 1975    PCP: Galdino Angulo MD   PCP Address: [address] [place] [address] / [place] [address]  PCP Phone Number: [phone]  PCP Fax: [phone]       Encounter Date: 11/24/2023    Admit to Home Health    Diagnoses:  Active Hospital Problems    Diagnosis  POA    *Abscess of right groin [L02.214]  Yes     Priority: 1 - High    DAVIDE (acute kidney injury) [N17.9]  No     Priority: 2     Type 2 diabetes mellitus, without long-term current use of insulin [E11.9]  Yes    Morbid obesity [E66.01]  Yes      Resolved Hospital Problems    Diagnosis Date Resolved POA    Sepsis [A41.9] 11/24/2023 Yes       No future appointments.   Follow-up Information       Galdino Angulo MD Follow up in 1 week(s).    Specialty: Family Medicine  Contact information:  [address] [place] [id number] [phone]               Vadim Prather MD Follow up in 1 week(s).    Specialties: General Surgery, Surgery  Contact information:  [address]  [address]  [place] [id number] [phone]                               I have seen and examined this patient face to face today. My clinical findings that support the need for the home health skilled services and home bound status are the following:  Medical restrictions requiring assistance of another human to leave home due to  Wound care needs.    Allergies:Review of patient's allergies indicates:  No Known Allergies    Diet: diabetic diet: 2000 calorie    Activities: activity as tolerated    Nursing:   SN to complete comprehensive assessment including routine vital signs. Instruct on disease process and s/s of complications to report to MD. Review/verify medication list sent home with the patient at time of discharge  and instruct patient/caregiver as needed. Frequency may be adjusted depending on start of care date.    Notify MD if SBP > 160 or < 90; DBP  > 90 or < 50; HR > 120 or < 50; Temp > 101    MISCELLANEOUS CARE:  Diabetic Care:   SN to perform and educate Diabetic management with blood glucose monitoring: and Fingerstick blood sugar a.m. and p.m.    WOUND CARE ORDERS  Local wound care to right groin wound daily- Once a day in AM remove packing from incision and cleanse wound and periwound skin with Vashe. Apply 3M Cavilon No Sting Film Barrier to periwound skin. Fill upper opening with 4x4 gauze saturated with Vashe. Fill lower opening with Kerlix roll saturated with Vashe. Cover with ABD pad and secure with mesh pants.      Medications: Review discharge medications with patient and family and provide education.      Current Discharge Medication List        START taking these medications    Details   acetaminophen (TYLENOL) 325 MG tablet Take 2 tablets (650 mg total) by mouth every 4 (four) hours as needed for Pain.  Refills: 0      cefadroxil (DURICEF) 500 MG Cap Take 1 capsule (500 mg total) by mouth every 12 (twelve) hours. for 7 days  Qty: 14 capsule, Refills: 0      gabapentin (NEURONTIN) 300 MG capsule Take 1 capsule (300 mg total) by mouth 3 (three) times daily.  Qty: 90 capsule, Refills: 11      HYDROcodone-acetaminophen (NORCO) 5-325 mg per tablet Take 1 tablet by mouth every 8 (eight) hours as needed (severe pain).  Qty: 20 tablet, Refills: 0           CONTINUE these medications which have NOT CHANGED    Details   ACCU-CHEK BRIDGER PLUS METER Misc Refills: 1      !! blood-glucose meter (FREESTYLE SYSTEM KIT) kit Of patients choice, kervin brand  Qty: 1 each, Refills: 1      !! blood-glucose meter (FREESTYLE SYSTEM KIT) kit Use as directed  Qty: 1 each, Refills: 1      !! blood-glucose meter (FREESTYLE SYSTEM KIT) kit One kit as directed  Qty: 1 each, Refills: 1      BOOSTRIX TDAP 2.5-8-5 Lf-mcg-Lf/0.5mL Susp Refills: 0      ergocalciferol (ERGOCALCIFEROL) 50,000 unit Cap TAKE 1 CAPSULE BY MOUTH EVERY 7 DAYS  Qty: 12 capsule, Refills: 3       fluticasone (FLONASE) 50 mcg/actuation nasal spray SHAKE LIQUID AND USE 1 SPRAY(50 MCG) IN EACH NOSTRIL EVERY DAY  Qty: 48 mL, Refills: 1    Comments: **Patient requests 90 days supply**      FLUVIRIN [id number] 45 mcg (15 mcg x 3)/0.5 mL Susp Refills: 0      glipiZIDE (GLUCOTROL) 5 MG tablet Take 1 tablet (5 mg total) by mouth daily with breakfast.  Qty: 30 tablet, Refills: 3      glyBURIDE (DIABETA) 5 MG tablet Refills: 5      LUTERA, 28, 0.1-20 mg-mcg per tablet Refills: 2      metFORMIN (GLUCOPHAGE) 500 MG tablet Take 1 tablet (500 mg total) by mouth 2 (two) times daily with meals. TAKE 1 TABLET(500 MG) BY MOUTH DAILY WITH BREAKFAST  Qty: 180 tablet, Refills: 1    Comments: **Patient requests 90 days supply**      pravastatin (PRAVACHOL) 40 MG tablet Refills: 2      TRADJENTA 5 mg Tab tablet TAKE 1 TABLET(5 MG) BY MOUTH EVERY DAY  Qty: 90 tablet, Refills: 3      TRUETEST TEST STRIPS Strp Refills: 5       !! - Potential duplicate medications found. Please discuss with provider.        STOP taking these medications       azithromycin (Z-KORI) 250 MG tablet Comments:   Reason for Stopping:         clotrimazole (LOTRIMIN) 1 % cream Comments:   Reason for Stopping:         etodolac (LODINE) 400 MG tablet Comments:   Reason for Stopping:         etodolac (LODINE) 400 MG tablet Comments:   Reason for Stopping:         ibuprofen (ADVIL,MOTRIN) 600 MG tablet Comments:   Reason for Stopping:         indomethacin (INDOCIN) 50 MG capsule Comments:   Reason for Stopping:         ketoconazole (NIZORAL) 2 % cream Comments:   Reason for Stopping:         meloxicam (MOBIC) 15 MG tablet Comments:   Reason for Stopping:               I certify that this patient is confined to her home and needs intermittent skilled nursing care.

## 2023-11-24 NOTE — PLAN OF CARE
Problem: Adjustment to Illness (Sepsis/Septic Shock)  Goal: Optimal Coping  Outcome: Ongoing, Progressing  Intervention: Optimize Psychosocial Adjustment to Illness  Flowsheets (Taken 11/24/2023 0644)  Supportive Measures:   active listening utilized   decision-making supported   goal-setting facilitated   positive reinforcement provided   problem-solving facilitated   relaxation techniques promoted   verbalization of feelings encouraged  Family/Support System Care: presence promoted     Problem: Bleeding (Sepsis/Septic Shock)  Goal: Absence of Bleeding  Outcome: Ongoing, Progressing  Intervention: Monitor and Manage Bleeding  Flowsheets (Taken 11/24/2023 0644)  Bleeding Precautions: blood pressure closely monitored  Bleeding Management: dressing monitored     Problem: Glycemic Control Impaired (Sepsis/Septic Shock)  Goal: Blood Glucose Level Within Desired Range  Outcome: Ongoing, Progressing  Intervention: Optimize Glycemic Control  Flowsheets (Taken 11/24/2023 0644)  Glycemic Management:   blood glucose monitored     Problem: Infection Progression (Sepsis/Septic Shock)  Goal: Absence of Infection Signs and Symptoms  Outcome: Ongoing, Progressing  Intervention: Initiate Sepsis Management  Flowsheets (Taken 11/24/2023 0644)  Infection Prevention:   environmental surveillance performed   rest/sleep promoted   single patient room provided  Infection Management: aseptic technique maintained  Intervention: Promote Stabilization  Flowsheets (Taken 11/24/2023 0644)  Fever Reduction/Comfort Measures:   lightweight bedding   lightweight clothing  Fluid/Electrolyte Management: fluids provided  Intervention: Promote Recovery  Flowsheets (Taken 11/24/2023 0644)  Sleep/Rest Enhancement:   awakenings minimized   consistent schedule promoted   family presence promoted   noise level reduced   regular sleep/rest pattern promoted   relaxation techniques promoted   room darkened     Problem: Infection  Goal: Absence of Infection  Signs and Symptoms  Outcome: Ongoing, Progressing  Intervention: Prevent or Manage Infection  Flowsheets (Taken 11/24/2023 0036)  Fever Reduction/Comfort Measures:   lightweight bedding   lightweight clothing  Infection Management: aseptic technique maintained

## 2023-11-24 NOTE — PROGRESS NOTES
Patient discharged home. CM unable to make arrangements for HH. Appointment made for follow up at [facility] on Tuesday. Nursing to teach family dressing change and patient aware of procedure. Provided patient with dressings for daily wound care until appointment at [facility]. To cleanse with Vashe. Apply 3M Cavilon No Sting Film Barrier to periwound skin. Fill wound with Vashe moistened Kerlix roll and cover with ABD pad. Provided with mesh pants to hold dressing in place. Keeping diaz catheter to avoid soiling dressing with urine.   To follow up with Surgery.

## 2023-11-24 NOTE — DISCHARGE SUMMARY
[facility] Medicine  Discharge Summary      Patient Name: Ashanti Thompson  MRN: [id number]  [facility]: [id number]  Patient Class: IP- Inpatient  Admission Date: 11/18/2023  Hospital Length of Stay: 6 days  Discharge Date and Time:  11/24/2023 10:50 AM  Attending Physician: Rajiv Jorge MD   Discharging Provider: Rajiv Jorge MD  Primary Care Provider: Galdino Angulo MD    Primary Care Team: Networked reference to record PCT     HPI:   This is a 48-year-old female with a past medical history of type 2 diabetes, hyperlipidemia and morbid obesity who presents with groin pain.    Patient developed right groin swelling and pain that started about 3 days prior to presentation.  She denies noticing any discharge or pus.  Additional symptoms include fevers, chills and diarrhea.    In the ED, the patient was hemodynamically stable.  CT abdomen and pelvis showed (1) edematous/inflammatory process involving the inferior medial right groin and perineum and extending posteriorly to the level of the inferior right buttock, there is appearance of edema and inflammation and areas of greater induration inferiorly, (2) there is air density within the soft tissue (possibly of gas producing infectious organisms should be considered). Patient was given 1.6 L of NS, Zosyn, clindamycin, and morphine 8 mg IV.  Surgery was consulted & notified by ED provider.  She was admitted for further management.    Procedure(s) (LRB):  DEBRIDEMENT, WOUND RIGHT GROINc (Right)      Hospital Course:   49 y/o female with past medical history of type 2 diabetes, hyperlipidemia and morbid obesity who presents with groin pain.  CT showing   Edematous/inflammatory process involving the inferior medial right groin and perineum and extending posteriorly to the level of the inferior right buttock, there is appearance of edema and inflammation and areas of greater induration inferiorly.  Along the aforementioned distribution of abnormality  there is air density within the soft tissues possibly of gas producing infectious organisms should be considered.   Surgery was consulted and patient had urgent I&D and drain placement.  Started on broad spectrum ABX's.  Wound Care consulted.  DAVIDE and Vanc stopped.  Started on IVF's.  ID consulted for ABX recommendations.  Wound cultures growing group B Strep.  ID recommending to continue Rocephin while inpatient and PO Cefadroxil for one week post discharge.    She has remained afebrile and hemodynamically stable.  Creat peaked and has now continued to improve.  Adequate urine output.  Drain removed by Surgery.  Patient will need continued wound care.  She will be discharged home with  wound care to follow up with PCP and Surgery.     Goals of Care Treatment Preferences:  Code Status: Full Code      Consults:   Consults (From admission, onward)          Status Ordering Provider     Inpatient consult to Midline team  Once        Provider:  (Not yet assigned)    Completed MICHAEL MUNOZ     Inpatient consult to Infectious Diseases  Once        Provider:  Patrick Tucker MD    Completed MICHAEL MUNOZ     Inpatient consult to General Surgery  Once        Provider:  Brie Christianson MD    Completed FRANCINE WONG            No new Assessment & Plan notes have been filed under this hospital service since the last note was generated.  Service: Hospital Medicine    Final Active Diagnoses:    Diagnosis Date Noted POA    PRINCIPAL PROBLEM:  Abscess of right groin [L02.214] 11/19/2023 Yes    DAVIDE (acute kidney injury) [N17.9] 11/21/2023 No    Type 2 diabetes mellitus, without long-term current use of insulin [E11.9] 11/19/2023 Yes    Morbid obesity [E66.01] 11/19/2023 Yes      Problems Resolved During this Admission:    Diagnosis Date Noted Date Resolved POA    Sepsis [A41.9] 11/19/2023 11/24/2023 Yes       Discharged Condition: stable    Disposition: Home-Health Care [facility]    Follow Up:   Follow-up Information  "      Galdino Angulo MD Follow up in 1 week(s).    Specialty: Family Medicine  Contact information:  [address] [place] [address] [phone]               Vadim Prather MD Follow up in 1 week(s).    Specialties: General Surgery, Surgery  Contact information:  [address]  [address]  [place] [address]  [phone]                           Patient Instructions:      COMMODE FOR HOME USE     Order Specific Question Answer Comments   Type: Heavy duty drop arm    Height: 5' 4" (1.626 m)    Weight: 149.4 kg (329 lb 5.9 oz)    Does patient have medical equipment at home? none    Length of need (1-99 months): 99      WALKER FOR HOME USE     Order Specific Question Answer Comments   Type of Walker: Heavy duty (300+ lbs)    With wheels? Yes    Height: 5' 4" (1.626 m)    Weight: 149.4 kg (329 lb 5.9 oz)    Length of need (1-99 months): 99    Does patient have medical equipment at home? none    Please check all that apply: Patient is unable to safely ambulate without equipment.      Diet diabetic     Notify your health care provider if you experience any of the following:  temperature >100.4     Notify your health care provider if you experience any of the following:  persistent nausea and vomiting or diarrhea     Notify your health care provider if you experience any of the following:  severe uncontrolled pain     Notify your health care provider if you experience any of the following:  redness, tenderness, or signs of infection (pain, swelling, redness, odor or green/yellow discharge around incision site)     Notify your health care provider if you experience any of the following:  difficulty breathing or increased cough     Notify your health care provider if you experience any of the following:  persistent dizziness, light-headedness, or visual disturbances     Notify your health care provider if you experience any of the following:  increased confusion or weakness     Activity as tolerated "       Significant Diagnostic Studies: N/A    Pending Diagnostic Studies:       None           Medications:  Reconciled Home Medications:      Medication List        START taking these medications      acetaminophen 325 MG tablet  Commonly known as: TYLENOL  Take 2 tablets (650 mg total) by mouth every 4 (four) hours as needed for Pain.     cefadroxil 500 MG Cap  Commonly known as: DURICEF  Take 1 capsule (500 mg total) by mouth every 12 (twelve) hours. for 7 days     gabapentin 300 MG capsule  Commonly known as: NEURONTIN  Take 1 capsule (300 mg total) by mouth 3 (three) times daily.     HYDROcodone-acetaminophen 5-325 mg per tablet  Commonly known as: NORCO  Take 1 tablet by mouth every 8 (eight) hours as needed (severe pain).            CHANGE how you take these medications      metFORMIN 500 MG tablet  Commonly known as: GLUCOPHAGE  Take 1 tablet (500 mg total) by mouth 2 (two) times daily with meals. TAKE 1 TABLET(500 MG) BY MOUTH DAILY WITH BREAKFAST  What changed: Another medication with the same name was removed. Continue taking this medication, and follow the directions you see here.            CONTINUE taking these medications      * blood-glucose meter kit  Commonly known as: FREESTYLE SYSTEM KIT  Of [employer] choice, [facility] brand     * ACCU-CHEK BRIDGER PLUS METER [facility]  Generic drug: blood-glucose meter     * blood-glucose meter kit  Commonly known as: FREESTYLE SYSTEM KIT  Use as directed     * blood-glucose meter kit  Commonly known as: FREESTYLE SYSTEM KIT  One kit as directed     BOOSTRIX TDAP 2.5-8-5 Lf-mcg-Lf/0.5mL Susp  Generic drug: diphth,pertus(acell),tetanus     ergocalciferol 50,000 unit Cap  Commonly known as: ERGOCALCIFEROL  TAKE 1 CAPSULE BY MOUTH EVERY 7 DAYS     fluticasone propionate 50 mcg/actuation nasal spray  Commonly known as: FLONASE  SHAKE LIQUID AND USE 1 SPRAY(50 MCG) IN EACH NOSTRIL EVERY DAY     FLUVIRIN 0808-9402 45 mcg (15 mcg x 3)/0.5 mL Susp  Generic drug: flu vaccine ts  2015-16(4yr,up)     glipiZIDE 5 MG tablet  Commonly known as: GLUCOTROL  Take 1 tablet (5 mg total) by mouth daily with breakfast.     glyBURIDE 5 MG tablet  Commonly known as: DIABETA     LUTERA (28) 0.1-20 mg-mcg per tablet  Generic drug: levonorgestrel-ethinyl estradiol     pravastatin 40 MG tablet  Commonly known as: PRAVACHOL     TRADJENTA 5 mg Tab tablet  Generic drug: linaGLIPtin  TAKE 1 TABLET(5 MG) BY MOUTH EVERY DAY     TRUETEST TEST STRIPS Strp  Generic drug: blood sugar diagnostic           * This list has 4 medication(s) that are the same as other medications prescribed for you. Read the directions carefully, and ask your doctor or other care provider to review them with you.                STOP taking these medications      azithromycin 250 MG tablet  Commonly known as: Z-KORI     clotrimazole 1 % cream  Commonly known as: LOTRIMIN     etodolac 400 MG tablet  Commonly known as: LODINE     ibuprofen 600 MG tablet  Commonly known as: ADVIL,MOTRIN     indomethacin 50 MG capsule  Commonly known as: INDOCIN     ketoconazole 2 % cream  Commonly known as: NIZORAL     meloxicam 15 MG tablet  Commonly known as: MOBIC              Indwelling Lines/Drains at time of discharge:   Lines/Drains/Airways       Drain  Duration                                       Time spent on the discharge of patient: >30 minutes         Rajiv Jorge MD  Department of Hospital Medicine  [facility]

## 2023-11-25 NOTE — NURSING
Pt being discharged with diaz. Diaz bag changed to leg bag and secured to upper thigh. Pt and family educated on how to empty leg bag.

## 2023-11-25 NOTE — NURSING
Wound care completed per wound care orders. Family educated on how to perform wound care once discharged. Family and patient asked if there was any questions concerning wound care. Family stated no they understood. Patient tolerated wound care well.

## 2023-11-27 NOTE — PHYSICIAN QUERY
"PT Name: Ashanti Thompson  MR #: [id number]    DOCUMENTATION CLARIFICATION     CDS/: Perez Edward               Contact information:[email or web address]  This form is a permanent document in the medical record.    Query Date: November 27, 2023  By submitting this query, we are merely seeking further clarification of documentation. Please utilize your independent clinical judgment when addressing the question(s) below.    The Medical Record contains the following:   Indicator Supporting Clinical Findings Location in Medical Record    Documentation of "Debridement" DEBRIDEMENT, WOUND RIGHT GROIN Op note on 11/19/2023    Documentation of "I&D" Small amounts of soupy necrotic tissue were removed.  Op note on 11/19/2023    Other We noted an immediate return of thick bloody pus from the area. We then extended our incision anteriorly and explored the area further. It appeared to track both anteriorly and posteriorly. We then made a counter incision over the second most indurated area that communicated with the inicial incision.  Op note on 11/19/2023     Excisional debridement is the surgical removal or cutting away of such tissue, necrosis, or slough and is classified to the root operation "Excision." Use of a sharp instrument does not always indicate that an excisional debridement was performed. Minor removal of loose fragments with scissors or using a sharp instrument to scrape away tissue is not an excisional debridement. Excisional debridement involves the use of a scalpel to remove devitalized tissue.  Nonexcisional debridement is the nonoperative brushing, irrigating, scrubbing, or washing of devitalized tissue, necrosis, slough, or foreign material. Most nonexcisional debridement procedures are classified to the root operation "Extraction" (pulling or stripping out or off all or a portion of a body part by the use of force).     Provider, please provide further clarification on the procedure performed " on Groin    [   ] Excisional Debridement of skin   [   ] Excisional Debridement of subcutaneous tissue and/or fascia   [   ] Excisional Debridement of muscle   [   ] Excisional Debridement of tendon   [   ] Excisional Debridement of bursa and/or ligaments   [   ] Excisional Debridement of bone   [   ] Excisional Debridement of joint        [   ] Non-excisional Debridement of skin   [   ] Non-excisional Debridement of subcutaneous tissue and/or fascia   [   ] Non-excisional Debridement of muscle   [   ] Non-excisional Debridement of tendon   [   ] Non-excisional Debridement of bursa and/or ligaments   [   ] Non-excisional Debridement of bone   [   ] Non-excisional Debridement of joint     [   ] Incision and Drainage to depth of skin only   [  x ] Incision and Drainage to depth of subcutaneous and fascia   [   ] Incision and Drainage to other depth (please specify): _____________     [   ] Other Procedure (please specify): _____________     Reference:    ICD-10-CM/PCS Coding Clinic Third Quarter ICD-10, Effective with discharges: October 7, 2015 [facility] [employer] § Excisional and nonexcisional debridement (2015).    Form No. 28391

## 2023-11-30 ENCOUNTER — OFFICE VISIT (OUTPATIENT)
Dept: UROLOGY | Facility: CLINIC | Age: 48
End: 2023-11-30
Payer: MEDICAID

## 2023-11-30 DIAGNOSIS — Z97.8 FOLEY CATHETER IN PLACE: ICD-10-CM

## 2023-11-30 DIAGNOSIS — L02.214 ABSCESS OF RIGHT GROIN: Primary | ICD-10-CM

## 2023-11-30 PROCEDURE — 1111F DSCHRG MED/CURRENT MED MERGE: CPT | Mod: CPTII,,, | Performed by: UROLOGY

## 2023-11-30 PROCEDURE — 99203 OFFICE O/P NEW LOW 30 MIN: CPT | Mod: S$PBB,,, | Performed by: UROLOGY

## 2023-11-30 PROCEDURE — 99999 PR PBB SHADOW E&M-EST. PATIENT-LVL III: CPT | Mod: PBBFAC,,, | Performed by: UROLOGY

## 2023-11-30 PROCEDURE — 1160F RVW MEDS BY RX/DR IN RCRD: CPT | Mod: CPTII,,, | Performed by: UROLOGY

## 2023-11-30 PROCEDURE — 1159F PR MEDICATION LIST DOCUMENTED IN MEDICAL RECORD: ICD-10-PCS | Mod: CPTII,,, | Performed by: UROLOGY

## 2023-11-30 PROCEDURE — 99999 PR PBB SHADOW E&M-EST. PATIENT-LVL III: ICD-10-PCS | Mod: PBBFAC,,, | Performed by: UROLOGY

## 2023-11-30 PROCEDURE — 99203 PR OFFICE/OUTPT VISIT, NEW, LEVL III, 30-44 MIN: ICD-10-PCS | Mod: S$PBB,,, | Performed by: UROLOGY

## 2023-11-30 PROCEDURE — 99213 OFFICE O/P EST LOW 20 MIN: CPT | Mod: PBBFAC | Performed by: UROLOGY

## 2023-11-30 PROCEDURE — 4010F PR ACE/ARB THEARPY RXD/TAKEN: ICD-10-PCS | Mod: CPTII,,, | Performed by: UROLOGY

## 2023-11-30 PROCEDURE — 1159F MED LIST DOCD IN RCRD: CPT | Mod: CPTII,,, | Performed by: UROLOGY

## 2023-11-30 PROCEDURE — 4010F ACE/ARB THERAPY RXD/TAKEN: CPT | Mod: CPTII,,, | Performed by: UROLOGY

## 2023-11-30 PROCEDURE — 1111F PR DISCHARGE MEDS RECONCILED W/ CURRENT OUTPATIENT MED LIST: ICD-10-PCS | Mod: CPTII,,, | Performed by: UROLOGY

## 2023-11-30 PROCEDURE — 1160F PR REVIEW ALL MEDS BY PRESCRIBER/CLIN PHARMACIST DOCUMENTED: ICD-10-PCS | Mod: CPTII,,, | Performed by: UROLOGY

## 2023-11-30 NOTE — PROGRESS NOTES
"  Subjective:       Ashanti Thompson is a 48 y.o. female who is a new patient who was seen  for evaluation of "hospital follow up".      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 She was debrided by general surgery 11/16/2023.     New to urology. Urology not consulted in hospital. She was discharged with diaz in place due to location of wounds/packing. Diaz remains in place. She has not seen gen surgery yet at outpatient - seeing next week. She is changing packing q2d. HHRN has not yet been approved.     Denies baseline voiding issues.     The following portions of the patient's history were reviewed and updated as appropriate: allergies, current medications, past family history, past medical history, past social history, past surgical history and problem list.    Review of Systems  12 point review of systems completed. Pertinent positive and negatives listed in HPI        Objective:    Vitals: LMP  (Approximate)     Physical Exam   General: well developed, well nourished in no acute distress  Head: normocephalic, atraumatic  Neck: no obvious enlargement of thyroid  HEENT: EOMI, mucus membranes moist, sclera anicteric, no hearing impairment  Lungs: symmetric expansion, non-labored breathing  Skin: no rashes or lesions  Neuro: alert and oriented x 3, no gross deficits  Psych: normal judgment and insight, normal mood/affect and non-anxious  Genitourinary: diaz in place. Dressing around SP area, not removed      Lab Review   Urine analysis today in clinic shows - diaz     Lab Results   Component Value Date    WBC 10.84 11/21/2023    HGB 10.4 (L) 11/21/2023    HCT 32.0 (L) 11/21/2023    MCV 81 (L) 11/21/2023     11/21/2023     Lab Results   Component Value Date    CREATININE 1.8 (H) 11/24/2023    BUN 18 11/24/2023         Imaging  Images and reports were personally reviewed by me and discussed with patient  CT reviewed       Assessment/Plan:      1. Abscess of right groin    - Pt feels she would " not be able to void without saturating her wound/dressing    - Will leave diaz in place now   - Okay to remove at any time, does not need specific voiding trial. Consider removal with general surgery next week if deemed appropriate.   - If unable to be removed elsewhere, f/u with urology clinic for removal.      2. Diaz catheter in place    - In place for urinary diversion to keep wound clean         Follow up PRN

## 2023-12-01 ENCOUNTER — HOSPITAL ENCOUNTER (EMERGENCY)
Facility: HOSPITAL | Age: 48
Discharge: HOME | End: 2023-12-01
Attending: EMERGENCY MEDICINE
Payer: MEDICAID

## 2023-12-01 VITALS
OXYGEN SATURATION: 97 % | WEIGHT: 293 LBS | RESPIRATION RATE: 20 BRPM | SYSTOLIC BLOOD PRESSURE: 147 MMHG | TEMPERATURE: 98 F | BODY MASS INDEX: 50.02 KG/M2 | HEIGHT: 64 IN | DIASTOLIC BLOOD PRESSURE: 92 MMHG | HEART RATE: 78 BPM

## 2023-12-01 DIAGNOSIS — T83.9XXA FOLEY CATHETER PROBLEM, INITIAL ENCOUNTER: Primary | ICD-10-CM

## 2023-12-01 PROCEDURE — 87077 CULTURE AEROBIC IDENTIFY: CPT | Performed by: EMERGENCY MEDICINE

## 2023-12-01 PROCEDURE — 99282 EMERGENCY DEPT VISIT SF MDM: CPT | Mod: ER

## 2023-12-01 PROCEDURE — 87088 URINE BACTERIA CULTURE: CPT | Performed by: EMERGENCY MEDICINE

## 2023-12-01 PROCEDURE — 87186 SC STD MICRODIL/AGAR DIL: CPT | Performed by: EMERGENCY MEDICINE

## 2023-12-01 PROCEDURE — 87086 URINE CULTURE/COLONY COUNT: CPT | Performed by: EMERGENCY MEDICINE

## 2023-12-01 NOTE — DISCHARGE INSTRUCTIONS
Your Michael catheter has been exchanged.  Medications and wound care as you have been.  Attend your scheduled follow-up with wound care and general surgery.  Urine culture has been sent.  We will contact you if you require antibiotics.  Return to the emergency department for any new, worsening or significantly concerning symptoms    Thank you for coming to our Emergency Department today. It is important to remember that some problems are difficult to diagnose and may not be found during your first visit. Be sure to follow up with your primary care doctor and review any labs/imaging that was performed with them. If you do not have a primary care doctor, you may contact the one listed on your discharge paperwork or you may also call the [facility] Appointment Desk at [phone] to schedule an appointment with one.     All medications may potentially have side effects and it is impossible to predict which medications may give you side effects. If you feel that you are having a negative effect of any medication you should immediately stop taking them and seek medical attention.    Return to the ER with any questions/concerns, new/concerning symptoms, worsening or failure to improve. Do not drive or make any important decisions for 24 hours if you have received any pain medications, sedatives or mood altering drugs during your ER visit.

## 2023-12-02 NOTE — ED PROVIDER NOTES
Encounter Date: 2023       History     Chief Complaint   Patient presents with    Female  Problem     Patient reports diaz catheter coming out and/or leaking at insertion site.  Diaz placed approximately one week ago when patient had surgery cyst removed.      49yo with history diabetes presents to the emergency department for evaluation of Diaz catheter that is leaking around the catheter.  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  She reports noting the catheter was leaking this morning.  Reports that there is still some urine entering the Diaz bag.  Denies discoloration of urine in Diaz bag.  Denies abdominal or pelvic pain or fever.  No nausea or vomiting.      Review of patient's allergies indicates:  No Known Allergies  Past Medical History:   Diagnosis Date    Diabetes mellitus     Hyperlipemia     Iron deficiency      Past Surgical History:   Procedure Laterality Date     SECTION      WOUND DEBRIDEMENT Right 2023    Procedure: DEBRIDEMENT, WOUND RIGHT GROINc;  Surgeon: Vadim Prather MD;  Location: [facility] OR;  Service: General;  Laterality: Right;     Family History   Problem Relation Age of Onset    Diabetes Father      Social History     Tobacco Use    Smoking status: Never    Smokeless tobacco: Never   Substance Use Topics    Alcohol use: No     Alcohol/week: 0.0 standard drinks of alcohol    Drug use: No     Review of Systems   Constitutional:  Negative for fever.   HENT:  Negative for sore throat.    Respiratory:  Negative for shortness of breath.    Cardiovascular:  Negative for chest pain.   Gastrointestinal:  Negative for nausea.   Genitourinary:  Negative for dysuria.        Leaking Diaz catheter   Musculoskeletal:  Negative for back pain.   Skin:  Positive for wound (Perineal). Negative for rash.   Neurological:  Negative for weakness.   Hematological:  Does not bruise/bleed easily.       Physical Exam      Initial Vitals [12/01/23 1435]   BP Pulse Resp Temp SpO2   (!) 150/94 73 18 98.5 °F (36.9 °C) 98 %      MAP       --         Physical Exam    Nursing note and vitals reviewed.  Constitutional: She is not diaphoretic. No distress.   HENT:   Head: Normocephalic and atraumatic.   Protecting airway   Eyes: Conjunctivae and EOM are normal. No scleral icterus.   Neck: Neck supple. No tracheal deviation present.   Normal range of motion.  Cardiovascular:  Normal rate, regular rhythm and intact distal pulses.           Pulmonary/Chest: No stridor. No respiratory distress.   Speaking in full sentences   Abdominal: Abdomen is soft. She exhibits no distension. There is no abdominal tenderness.   Genitourinary:    Genitourinary Comments: Chaperoned by LINDA Jeffers  Perineal wound healing without ector purulence or crepitation   Michael catheter in place entering there urethral meatus.  There is some leakage of urine around catheter.     Musculoskeletal:         General: No tenderness or edema.      Cervical back: Normal range of motion and neck supple.     Neurological: She is alert. She has normal strength. No cranial nerve deficit or sensory deficit.   Skin: Skin is warm and dry.   Psychiatric: She has a normal mood and affect.         ED Course   Procedures  Labs Reviewed   CULTURE, URINE          Imaging Results    None          Medications - No data to display  Medical Decision Making  Differential diagnosis Includes Michael catheter malfunction, Michael catheter clonidine, Michael catheter dislodgement    Patient is afebrile and in no acute distress at time history and physical.  She has some leaking for Michael catheter.  Her perineal wounds appears to be healing well.  Michael catheter exchange without difficulty.  Urine culture sent.  Patient tolerated catheter exchange without difficulty.  She reports her family members for currently performing wound care.  She reports that home health services or currently being establish  with her and home health will perform ongoing Michael catheter care.  Patient does not appear to require further emergent evaluation.  She is fit for discharge to follow up with her primary physician, wound care, general surgery.  Patient and family member at the bedside counseled on supportive care, appropriate medication usage, concerning symptoms for which to return to ER and the importance of follow up. Understanding and agreement with treatment plan was expressed.                      This chart was completed using dictation software, as a result there may be some transcription errors.                  Clinical Impression:  Final diagnoses:  [T83.9XXA] Michael catheter problem, initial encounter (Primary)          ED Disposition Condition    Discharge Stable          ED Prescriptions    None       Follow-up Information    None          Kirill Zambrano MD  12/02/23 5965

## 2023-12-04 LAB — BACTERIA UR CULT: ABNORMAL

## 2023-12-08 ENCOUNTER — OFFICE VISIT (OUTPATIENT)
Dept: SURGERY | Facility: CLINIC | Age: 48
End: 2023-12-08
Payer: MEDICAID

## 2023-12-08 VITALS
WEIGHT: 293 LBS | SYSTOLIC BLOOD PRESSURE: 171 MMHG | DIASTOLIC BLOOD PRESSURE: 94 MMHG | HEIGHT: 64 IN | BODY MASS INDEX: 50.02 KG/M2 | HEART RATE: 75 BPM

## 2023-12-08 DIAGNOSIS — L02.214 ABSCESS OF RIGHT GROIN: Primary | ICD-10-CM

## 2023-12-08 PROCEDURE — 99999 PR PBB SHADOW E&M-EST. PATIENT-LVL II: CPT | Mod: PBBFAC,,, | Performed by: SURGERY

## 2023-12-08 PROCEDURE — 3077F PR MOST RECENT SYSTOLIC BLOOD PRESSURE >= 140 MM HG: ICD-10-PCS | Mod: CPTII,,, | Performed by: SURGERY

## 2023-12-08 PROCEDURE — 3008F BODY MASS INDEX DOCD: CPT | Mod: CPTII,,, | Performed by: SURGERY

## 2023-12-08 PROCEDURE — 4010F PR ACE/ARB THEARPY RXD/TAKEN: ICD-10-PCS | Mod: CPTII,,, | Performed by: SURGERY

## 2023-12-08 PROCEDURE — 3080F DIAST BP >= 90 MM HG: CPT | Mod: CPTII,,, | Performed by: SURGERY

## 2023-12-08 PROCEDURE — 99213 PR OFFICE/OUTPT VISIT, EST, LEVL III, 20-29 MIN: ICD-10-PCS | Mod: S$PBB,,, | Performed by: SURGERY

## 2023-12-08 PROCEDURE — 1111F PR DISCHARGE MEDS RECONCILED W/ CURRENT OUTPATIENT MED LIST: ICD-10-PCS | Mod: CPTII,,, | Performed by: SURGERY

## 2023-12-08 PROCEDURE — 1111F DSCHRG MED/CURRENT MED MERGE: CPT | Mod: CPTII,,, | Performed by: SURGERY

## 2023-12-08 PROCEDURE — 4010F ACE/ARB THERAPY RXD/TAKEN: CPT | Mod: CPTII,,, | Performed by: SURGERY

## 2023-12-08 PROCEDURE — 3077F SYST BP >= 140 MM HG: CPT | Mod: CPTII,,, | Performed by: SURGERY

## 2023-12-08 PROCEDURE — 99212 OFFICE O/P EST SF 10 MIN: CPT | Mod: PBBFAC | Performed by: SURGERY

## 2023-12-08 PROCEDURE — 99999 PR PBB SHADOW E&M-EST. PATIENT-LVL II: ICD-10-PCS | Mod: PBBFAC,,, | Performed by: SURGERY

## 2023-12-08 PROCEDURE — 99213 OFFICE O/P EST LOW 20 MIN: CPT | Mod: S$PBB,,, | Performed by: SURGERY

## 2023-12-08 PROCEDURE — 3008F PR BODY MASS INDEX (BMI) DOCUMENTED: ICD-10-PCS | Mod: CPTII,,, | Performed by: SURGERY

## 2023-12-08 PROCEDURE — 3080F PR MOST RECENT DIASTOLIC BLOOD PRESSURE >= 90 MM HG: ICD-10-PCS | Mod: CPTII,,, | Performed by: SURGERY

## 2023-12-08 NOTE — PROGRESS NOTES
47 y/o woman with history  of right groin debridement, here for f/u and with c/o hard knot close to incision.  Had recent dressing change. She denies any pain    PE: induration but nontender. which may be part of healing vs new abscess    Impression: doing well    Plan: warm compresses to indurated area, f/u in 2-3 weeks sooner if area gets bigger or becomes painful

## 2023-12-13 ENCOUNTER — TELEPHONE (OUTPATIENT)
Dept: FAMILY MEDICINE | Facility: CLINIC | Age: 48
End: 2023-12-13
Payer: MEDICAID

## 2023-12-13 NOTE — TELEPHONE ENCOUNTER
----- Message from Heather Simmons sent at 12/13/2023  9:09 AM CST -----  Regarding: Lab work  Good Morning, Ms. Thompson is here says she was told to come in to do lab work but there are no orders in. Can those orders be put in please if needed. Thank you.

## 2023-12-14 ENCOUNTER — TELEPHONE (OUTPATIENT)
Dept: UROLOGY | Facility: CLINIC | Age: 48
End: 2023-12-14
Payer: MEDICAID

## 2023-12-14 NOTE — TELEPHONE ENCOUNTER
----- Message from Jonathan Jorge sent at 12/14/2023 11:56 AM CST -----  Regarding: pt called  Type: Patient Call Back         Who called: TRACY RAMIREZ [[id number]]         What is the request in detail: Pt called to get catheter removed.          Can the clinic reply by [facility]? No         Would the patient rather a call back or a response via [facility]? Call back         Best call back number:       [phone] or                     Thank you.

## 2023-12-14 NOTE — TELEPHONE ENCOUNTER
Please schedule f/u appt for catheter removal with me next week. 12/18 at 3:40pm or 12/19 at 11:00am

## 2023-12-18 ENCOUNTER — OFFICE VISIT (OUTPATIENT)
Dept: UROLOGY | Facility: CLINIC | Age: 48
End: 2023-12-18
Payer: MEDICAID

## 2023-12-18 DIAGNOSIS — L02.214 ABSCESS OF RIGHT GROIN: Primary | ICD-10-CM

## 2023-12-18 DIAGNOSIS — Z97.8 FOLEY CATHETER IN PLACE: ICD-10-CM

## 2023-12-18 PROCEDURE — 99213 OFFICE O/P EST LOW 20 MIN: CPT | Mod: S$PBB,,, | Performed by: UROLOGY

## 2023-12-18 PROCEDURE — 4010F PR ACE/ARB THEARPY RXD/TAKEN: ICD-10-PCS | Mod: CPTII,,, | Performed by: UROLOGY

## 2023-12-18 PROCEDURE — 99213 PR OFFICE/OUTPT VISIT, EST, LEVL III, 20-29 MIN: ICD-10-PCS | Mod: S$PBB,,, | Performed by: UROLOGY

## 2023-12-18 PROCEDURE — 1160F PR REVIEW ALL MEDS BY PRESCRIBER/CLIN PHARMACIST DOCUMENTED: ICD-10-PCS | Mod: CPTII,,, | Performed by: UROLOGY

## 2023-12-18 PROCEDURE — 99999 PR PBB SHADOW E&M-EST. PATIENT-LVL III: CPT | Mod: PBBFAC,,, | Performed by: UROLOGY

## 2023-12-18 PROCEDURE — 1160F RVW MEDS BY RX/DR IN RCRD: CPT | Mod: CPTII,,, | Performed by: UROLOGY

## 2023-12-18 PROCEDURE — 4010F ACE/ARB THERAPY RXD/TAKEN: CPT | Mod: CPTII,,, | Performed by: UROLOGY

## 2023-12-18 PROCEDURE — 99999 PR PBB SHADOW E&M-EST. PATIENT-LVL III: ICD-10-PCS | Mod: PBBFAC,,, | Performed by: UROLOGY

## 2023-12-18 PROCEDURE — 1159F PR MEDICATION LIST DOCUMENTED IN MEDICAL RECORD: ICD-10-PCS | Mod: CPTII,,, | Performed by: UROLOGY

## 2023-12-18 PROCEDURE — 3046F PR MOST RECENT HEMOGLOBIN A1C LEVEL > 9.0%: ICD-10-PCS | Mod: CPTII,,, | Performed by: UROLOGY

## 2023-12-18 PROCEDURE — 1111F PR DISCHARGE MEDS RECONCILED W/ CURRENT OUTPATIENT MED LIST: ICD-10-PCS | Mod: CPTII,,, | Performed by: UROLOGY

## 2023-12-18 PROCEDURE — 1159F MED LIST DOCD IN RCRD: CPT | Mod: CPTII,,, | Performed by: UROLOGY

## 2023-12-18 PROCEDURE — 3046F HEMOGLOBIN A1C LEVEL >9.0%: CPT | Mod: CPTII,,, | Performed by: UROLOGY

## 2023-12-18 PROCEDURE — 99213 OFFICE O/P EST LOW 20 MIN: CPT | Mod: PBBFAC | Performed by: UROLOGY

## 2023-12-18 PROCEDURE — 1111F DSCHRG MED/CURRENT MED MERGE: CPT | Mod: CPTII,,, | Performed by: UROLOGY

## 2023-12-18 NOTE — PROGRESS NOTES
"  Subjective:       Ashanti Thompson is a 48 y.o. female who is an established patient who was seen  for evaluation of "hospital follow up".      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 She was debrided by general surgery 11/16/2023.     New to urology. Urology not consulted in hospital. She was discharged with diaz in place due to location of wounds/packing. Diaz remains in place. She has not seen gen surgery yet at outpatient - seeing next week. She is changing packing q2d. HHRN has not yet been approved.     Denies baseline voiding issues.     Catheter has not been removed. Here for removal.     The following portions of the patient's history were reviewed and updated as appropriate: allergies, current medications, past family history, past medical history, past social history, past surgical history and problem list.    Review of Systems  12 point review of systems completed. Pertinent positive and negatives listed in HPI        Objective:    Vitals: LMP  (Approximate)     Physical Exam   General: well developed, well nourished in no acute distress  Head: normocephalic, atraumatic  Neck: no obvious enlargement of thyroid  HEENT: EOMI, mucus membranes moist, sclera anicteric, no hearing impairment  Lungs: symmetric expansion, non-labored breathing  Skin: no rashes or lesions  Neuro: alert and oriented x 3, no gross deficits  Psych: normal judgment and insight, normal mood/affect and non-anxious  Genitourinary: diaz in place. Dressing around SP area, not removed      Lab Review   Urine analysis today in clinic shows - diaz     Lab Results   Component Value Date    WBC 4.5 12/13/2023    WBC 10.84 11/21/2023    HGB 11.2 12/13/2023    HGB 10.4 (L) 11/21/2023    HCT 33.4 (L) 12/13/2023    HCT 32.0 (L) 11/21/2023    MCV 80 12/13/2023    MCV 81 (L) 11/21/2023     12/13/2023     11/21/2023     Lab Results   Component Value Date    CREATININE 1.42 (H) 12/13/2023    BUN 12 12/13/2023 "         Imaging  Images and reports were personally reviewed by me and discussed with patient  CT reviewed       Assessment/Plan:      1. Abscess of right groin    - Pt feels she would not be able to void without saturating her wound/dressing    - Will leave diaz in place now   - Okay to remove at any time, does not need specific voiding trial.    - Will remove now as has not yet been done. Catheter removed. Formal void trial not done as was not in retention. Return precautions discussed.      2. Diaz catheter in place    - In place for urinary diversion to keep wound clean         Follow up PRN

## 2023-12-21 ENCOUNTER — OFFICE VISIT (OUTPATIENT)
Dept: SURGERY | Facility: CLINIC | Age: 48
End: 2023-12-21
Payer: MEDICAID

## 2023-12-21 VITALS
BODY MASS INDEX: 50.02 KG/M2 | WEIGHT: 293 LBS | HEART RATE: 78 BPM | HEIGHT: 64 IN | DIASTOLIC BLOOD PRESSURE: 92 MMHG | SYSTOLIC BLOOD PRESSURE: 142 MMHG

## 2023-12-21 DIAGNOSIS — L02.214 ABSCESS OF RIGHT GROIN: Primary | ICD-10-CM

## 2023-12-21 PROCEDURE — 99999 PR PBB SHADOW E&M-EST. PATIENT-LVL II: CPT | Mod: PBBFAC,,, | Performed by: SURGERY

## 2023-12-21 PROCEDURE — 4010F ACE/ARB THERAPY RXD/TAKEN: CPT | Mod: CPTII,,, | Performed by: SURGERY

## 2023-12-21 PROCEDURE — 1111F DSCHRG MED/CURRENT MED MERGE: CPT | Mod: CPTII,,, | Performed by: SURGERY

## 2023-12-21 PROCEDURE — 3046F PR MOST RECENT HEMOGLOBIN A1C LEVEL > 9.0%: ICD-10-PCS | Mod: CPTII,,, | Performed by: SURGERY

## 2023-12-21 PROCEDURE — 3080F PR MOST RECENT DIASTOLIC BLOOD PRESSURE >= 90 MM HG: ICD-10-PCS | Mod: CPTII,,, | Performed by: SURGERY

## 2023-12-21 PROCEDURE — 3008F BODY MASS INDEX DOCD: CPT | Mod: CPTII,,, | Performed by: SURGERY

## 2023-12-21 PROCEDURE — 99212 OFFICE O/P EST SF 10 MIN: CPT | Mod: PBBFAC | Performed by: SURGERY

## 2023-12-21 PROCEDURE — 99999 PR PBB SHADOW E&M-EST. PATIENT-LVL II: ICD-10-PCS | Mod: PBBFAC,,, | Performed by: SURGERY

## 2023-12-21 PROCEDURE — 4010F PR ACE/ARB THEARPY RXD/TAKEN: ICD-10-PCS | Mod: CPTII,,, | Performed by: SURGERY

## 2023-12-21 PROCEDURE — 3008F PR BODY MASS INDEX (BMI) DOCUMENTED: ICD-10-PCS | Mod: CPTII,,, | Performed by: SURGERY

## 2023-12-21 PROCEDURE — 99213 OFFICE O/P EST LOW 20 MIN: CPT | Mod: S$PBB,,, | Performed by: SURGERY

## 2023-12-21 PROCEDURE — 3080F DIAST BP >= 90 MM HG: CPT | Mod: CPTII,,, | Performed by: SURGERY

## 2023-12-21 PROCEDURE — 1111F PR DISCHARGE MEDS RECONCILED W/ CURRENT OUTPATIENT MED LIST: ICD-10-PCS | Mod: CPTII,,, | Performed by: SURGERY

## 2023-12-21 PROCEDURE — 3077F SYST BP >= 140 MM HG: CPT | Mod: CPTII,,, | Performed by: SURGERY

## 2023-12-21 PROCEDURE — 3077F PR MOST RECENT SYSTOLIC BLOOD PRESSURE >= 140 MM HG: ICD-10-PCS | Mod: CPTII,,, | Performed by: SURGERY

## 2023-12-21 PROCEDURE — 99213 PR OFFICE/OUTPT VISIT, EST, LEVL III, 20-29 MIN: ICD-10-PCS | Mod: S$PBB,,, | Performed by: SURGERY

## 2023-12-21 PROCEDURE — 3046F HEMOGLOBIN A1C LEVEL >9.0%: CPT | Mod: CPTII,,, | Performed by: SURGERY

## 2023-12-21 NOTE — PROGRESS NOTES
49 y/o woman with history of inguinal abscess and debridement, here for f/u and without complaints.    PE: wounds closing nicely, good granulation tissue and no more induration    Plan: continue current treatment, f/u in 3 weeks, ok to shower.

## 2024-01-12 ENCOUNTER — OFFICE VISIT (OUTPATIENT)
Dept: SURGERY | Facility: CLINIC | Age: 49
End: 2024-01-12
Payer: MEDICAID

## 2024-01-12 VITALS
HEIGHT: 64 IN | BODY MASS INDEX: 50.02 KG/M2 | WEIGHT: 293 LBS | SYSTOLIC BLOOD PRESSURE: 154 MMHG | HEART RATE: 83 BPM | DIASTOLIC BLOOD PRESSURE: 83 MMHG

## 2024-01-12 DIAGNOSIS — L02.214 ABSCESS OF RIGHT GROIN: Primary | ICD-10-CM

## 2024-01-12 PROCEDURE — 3008F BODY MASS INDEX DOCD: CPT | Mod: CPTII,,, | Performed by: SURGERY

## 2024-01-12 PROCEDURE — 99212 OFFICE O/P EST SF 10 MIN: CPT | Mod: S$PBB,,, | Performed by: SURGERY

## 2024-01-12 PROCEDURE — 3077F SYST BP >= 140 MM HG: CPT | Mod: CPTII,,, | Performed by: SURGERY

## 2024-01-12 PROCEDURE — 3079F DIAST BP 80-89 MM HG: CPT | Mod: CPTII,,, | Performed by: SURGERY

## 2024-01-12 PROCEDURE — 99999 PR PBB SHADOW E&M-EST. PATIENT-LVL II: CPT | Mod: PBBFAC,,, | Performed by: SURGERY

## 2024-01-12 PROCEDURE — 99212 OFFICE O/P EST SF 10 MIN: CPT | Mod: PBBFAC | Performed by: SURGERY

## 2024-01-12 NOTE — PROGRESS NOTES
49 y/o woman with history of inguinal abscess and debridement, here for f/u and without complaints.    PE: wounds closing nicely, good granulation tissue and no more induration    Plan: continue current treatment, f/u in 3 weeks

## 2024-01-22 ENCOUNTER — TELEPHONE (OUTPATIENT)
Dept: SURGERY | Facility: CLINIC | Age: 49
End: 2024-01-22
Payer: MEDICAID

## 2024-02-26 PROBLEM — N17.9 AKI (ACUTE KIDNEY INJURY): Status: RESOLVED | Noted: 2023-11-21 | Resolved: 2024-02-26

## 2025-03-25 ENCOUNTER — OFFICE VISIT (OUTPATIENT)
Dept: URGENT CARE | Facility: CLINIC | Age: 50
End: 2025-03-25
Payer: MEDICAID

## 2025-03-25 VITALS
HEART RATE: 82 BPM | BODY MASS INDEX: 50.02 KG/M2 | WEIGHT: 293 LBS | SYSTOLIC BLOOD PRESSURE: 125 MMHG | TEMPERATURE: 98 F | HEIGHT: 64 IN | OXYGEN SATURATION: 98 % | RESPIRATION RATE: 20 BRPM | DIASTOLIC BLOOD PRESSURE: 82 MMHG

## 2025-03-25 DIAGNOSIS — B97.89 ACUTE VIRAL SINUSITIS: Primary | ICD-10-CM

## 2025-03-25 DIAGNOSIS — J01.90 ACUTE VIRAL SINUSITIS: Primary | ICD-10-CM

## 2025-03-25 DIAGNOSIS — R09.81 SINUS CONGESTION: ICD-10-CM

## 2025-03-25 LAB
CTP QC/QA: YES
POC MOLECULAR INFLUENZA A AGN: NEGATIVE
POC MOLECULAR INFLUENZA B AGN: NEGATIVE

## 2025-03-25 PROCEDURE — 99203 OFFICE O/P NEW LOW 30 MIN: CPT | Mod: S$GLB,,, | Performed by: FAMILY MEDICINE

## 2025-03-25 PROCEDURE — 87502 INFLUENZA DNA AMP PROBE: CPT | Mod: QW,S$GLB,, | Performed by: FAMILY MEDICINE

## 2025-03-25 RX ORDER — LORATADINE 10 MG/1
10 TABLET ORAL DAILY
Qty: 30 TABLET | Refills: 0 | Status: SHIPPED | OUTPATIENT
Start: 2025-03-25 | End: 2025-04-24

## 2025-03-25 RX ORDER — FLUTICASONE PROPIONATE 50 MCG
1 SPRAY, SUSPENSION (ML) NASAL DAILY
Qty: 16 G | Refills: 0 | Status: SHIPPED | OUTPATIENT
Start: 2025-03-25

## 2025-03-25 NOTE — PROGRESS NOTES
"Subjective:      Patient ID: Ashanti Thompson is a 49 y.o. female.    Vitals:  height is 5' 4" (1.626 m) and weight is 149.2 kg (329 lb) (abnormal). Her oral temperature is 97.6 °F (36.4 °C). Her blood pressure is 125/82 and her pulse is 82. Her respiration is 20 and oxygen saturation is 98%.     Chief Complaint: Sinus Problem    Pt reports sinus congestion x 2 days. She says she tried Claritin and Flonase without relief. No fever or chills. No cp or SOB. No GI related symptoms, including, N/v/D or constipation. No anosmia or ageusia. Denies any body aches, says she has some fatigue. She took a covid test Adam night and that was negative. She says she has been coughing.     Sinus Problem  This is a new problem. The current episode started in the past 7 days (2 days). The problem has been gradually worsening since onset. There has been no fever. Her pain is at a severity of 2/10. The pain is mild. Associated symptoms include congestion, headaches, sinus pressure and a sore throat. Pertinent negatives include no chills, coughing, diaphoresis, ear pain, hoarse voice, neck pain, shortness of breath, sneezing or swollen glands. Past treatments include oral decongestants. The treatment provided no relief.       Constitution: Positive for fatigue. Negative for activity change, appetite change, chills, sweating, fever, unexpected weight change and generalized weakness.   HENT:  Positive for congestion, sinus pain, sinus pressure and sore throat. Negative for ear pain, nosebleeds, foreign body in nose and postnasal drip.    Neck: Negative for neck pain.   Respiratory:  Negative for cough and shortness of breath.    Allergic/Immunologic: Negative for sneezing.   Neurological:  Positive for headaches.      Objective:     Physical Exam   Constitutional: She is oriented to person, place, and time. She appears well-developed.  Non-toxic appearance. She does not appear ill. No distress. obesity  HENT:   Head: Normocephalic and " atraumatic.   Ears:   Right Ear: External ear normal.   Left Ear: External ear normal.   Nose: Nose normal.   Mouth/Throat: Uvula is midline, oropharynx is clear and moist and mucous membranes are normal. Cobblestoning present. No oropharyngeal exudate, posterior oropharyngeal edema, posterior oropharyngeal erythema or tonsillar abscesses. No tonsillar exudate.   Eyes: Conjunctivae, EOM and lids are normal. Pupils are equal, round, and reactive to light.   Neck: Trachea normal and phonation normal. Neck supple.   Cardiovascular: Normal rate.   Pulmonary/Chest: Effort normal and breath sounds normal.   Musculoskeletal: Normal range of motion.         General: Normal range of motion.   Neurological: She is alert and oriented to person, place, and time.   Skin: Skin is warm, dry, intact and not diaphoretic.   Psychiatric: Her speech is normal and behavior is normal. Judgment and thought content normal.   Nursing note and vitals reviewed.    Results for orders placed or performed in visit on 03/25/25   POCT Influenza A/B MOLECULAR    Collection Time: 03/25/25  9:55 AM   Result Value Ref Range    POC Molecular Influenza A Ag Negative Negative    POC Molecular Influenza B Ag Negative Negative     Acceptable Yes       Assessment:     1. Acute viral sinusitis    2. Sinus congestion        Plan:     Flu neg, vss, lungs ctab, nad, rtw note provided, otc meds reviewed, nasal rinses encouraged    Discussed results/diagnosis/plan with patient in clinic. Strict precautions given to patient to monitor for worsening signs and symptoms. Advised to follow up with PCP or specialist.    Explained side effects of medications prescribed with patient and informed him/her to discontinue use if he/she has any side effects and to inform UC or PCP if this occurs. All questions answered. Strict ED verses clinic return precautions stressed and given in depth. Advised if symptoms worsens of fail to improve he/she should go to the  Emergency Room. Discharge and follow-up instructions given verbally/printed with the patient who expressed understanding and willingness to comply with my recommendations. Patient voiced understanding and in agreement with current treatment plan. Patient exits the exam room in no acute distress. Conversant and engaged during discharge discussion, verbalized understanding.      Acute viral sinusitis  -     loratadine (CLARITIN) 10 mg tablet; Take 1 tablet (10 mg total) by mouth once daily.  Dispense: 30 tablet; Refill: 0  -     fluticasone propionate (FLONASE) 50 mcg/actuation nasal spray; 1 spray (50 mcg total) by Each Nostril route once daily.  Dispense: 16 g; Refill: 0    Sinus congestion  -     POCT Influenza A/B MOLECULAR  -     loratadine (CLARITIN) 10 mg tablet; Take 1 tablet (10 mg total) by mouth once daily.  Dispense: 30 tablet; Refill: 0  -     fluticasone propionate (FLONASE) 50 mcg/actuation nasal spray; 1 spray (50 mcg total) by Each Nostril route once daily.  Dispense: 16 g; Refill: 0

## 2025-03-25 NOTE — LETTER
March 25, 2025      [facility]  [address], [address]  [place] [address]  Phone: [phone]  Fax: [phone]       Patient: Ashanti Thompson   YOB: 1975  Date of Visit: 03/25/2025    To Whom It May Concern:    Reyna Thompson  was at [facility] on 03/25/2025. The patient may return to work/school on 3/27/2025 with no restrictions. If you have any questions or concerns, or if I can be of further assistance, please do not hesitate to contact me.    Sincerely,    Heather Miller NP

## 2025-03-25 NOTE — PATIENT INSTRUCTIONS
General Discharge Instructions   PLEASE READ YOUR DISCHARGE INSTRUCTIONS ENTIRELY AS IT CONTAINS IMPORTANT INFORMATION.  If you were prescribed a narcotic or controlled medication, do not drive or operate heavy equipment or machinery while taking these medications.  If you were prescribed antibiotics, please take them to completion.  You must understand that you've received an Urgent Care treatment only and that you may be released before all your medical problems are known or treated. You, the patient, will arrange for follow up care as instructed.    OVER THE COUNTER RECOMMENDATIONS/SUGGESTIONS.    Make sure to stay well hydrated.    Use Nasal Saline to mechanically move any post nasal drip from your eustachian tube or from the back of your throat.    Use warm salt water gargles to ease your throat pain. Warm salt water gargles as needed for sore throat- 1/2 tsp salt to 1 cup warm water, gargle as desired.    Use an antihistamine such as Claritin, Zyrtec or Allegra to dry you out.    Use pseudoephedrine (behind the counter) to decongest. Pseudoephedrine 30 mg up to 240 mg /day. It can raise your blood pressure and give you palpitations.    Use mucinex (guaifenesin) to break up mucous up to 2400mg/day to loosen any mucous.    The mucinex DM pill has a cough suppressant that can be sedating. It can be used at night to stop the tickle at the back of your throat.    You can use Mucinex D (it has guaifenesin and a high dose of pseudoephedrine) in the mornings to help decongest.    Use Afrin in each nare for no longer than 3 days, as it is addictive. It can also dry out your mucous membranes and cause elevated blood pressure. This is especially useful if you are flying.    Use Flonase 1-2 sprays/nostril per day. It is a local acting steroid nasal spray, if you develop a bloody nose, stop using the medication immediately.    Sometimes Nyquil at night is beneficial to help you get some rest, however it is sedating and it  does have an antihistamine, and tylenol.    Honey is a natural cough suppressant that can be used.    Tylenol up to 4,000 mg a day is safe for short periods and can be used for body aches, pain, and fever. However in high doses and prolonged use it can cause liver irritation.    Ibuprofen is a non-steroidal anti-inflammatory that can be used for body aches, pain, and fever.However it can also cause stomach irritation if over used.     Follow up with your PCP or specialty clinic as instructed in the next 2-3 days if not improved or as needed. You can call [phone] to schedule an appointment with appropriate provider.      If you condition worsens, we recommend that you receive another evaluation at the emergency room immediately or contact your primary medical clinic's after hours call service to discuss your concerns.      Please return here or go to the Emergency Department for any concerns or worsening condition.   You can also call [phone] to schedule an appointment with the appropriate provider.    Please return here or go to the Emergency Department for any concerns or worsening of condition.    Thank you for choosing [facility]!    Our goal in the Urgent Care is to always provide outstanding medical care. You may receive a survey by mail or e-mail in the next week regarding your experience today. We would greatly appreciate you completing and returning the survey. Your feedback provides us with a way to recognize our staff who provide very good care, and it helps us learn how to improve when your experience was below our aspiration of excellence.      We appreciate you trusting us with your medical care. We hope you feel better soon. We will be happy to take care of you for all of your future medical needs.    Sincerely,    IVETT Diamond  Follow up with your Primary Care Provider in 2-3 days if no improvement.  If you do not have one, please see the list provided and become  established with one.  If your condition worsens we recommend that you receive another evaluation at the emergency room immediately or contact your primary medical clinics after hours call service to discuss your concerns.  Flonase nasal spray as directed. Breathe right strips at night to help you breathe.  A cool mist humidifier in bedroom may help with cough and relieve stuffy nose.  Sore throat:  Lozenge, hard candy or honey.  Sinus rinses DO NOT USE TAP WATER, if you must, water must be a rolling boil for 1 minute, let it cool, then use.  May use distilled water.  Vics vapor rub in shower to help open nasal passages.  May use nasal gel to keep passages moisturized.  May use Nasal saline sprays during the day for added relief of congestion.   For those who go to the gym, please do not use the sauna or steam room now to clear sinuses.  During pollen season, change shirt if you are outside for a while when you go in.  Also wash your face.  Do not touch your face with your hands.  Wash your hands often in general while ill, avoid face contact with hands. Good nutrition. Lots of rest You may or may not have received a steroid injection, if you have, you may experience some jitters or develop nervousness.  You may also not rest well this night- these symptoms will resolve.  If you were give a prescription for steroids, do not medications such as Motrin, Advil, Ibuprofen, Aleve, Mobic, or Toradol while taking the steroid. these are non-steroidal anti-inflammatory medications which you do not need while taking steroids.  If you were given an antibiotic to take at home, please take it until it is completed even if you begin feeling better prior to the course of therapy being completed.  To attempt to minimize abdominal discomfort while taking antibiotics, you may try to take probiotics.  SEPARATE the antibiotics and probiotics by TWO hours, if not neither medication will work.  If you received a steroid shot today - this  can elevate your blood pressure, elevate your blood sugar, water weight gain, nervous energy, redness to the face and dimpling of the skin where the shot goes in.     You must understand that you've received an Urgent Care treatment only and that you may be released before all your medical problems are known or treated. You, the patient, will arrange for follow up care as instructed

## 2025-03-29 ENCOUNTER — OFFICE VISIT (OUTPATIENT)
Dept: URGENT CARE | Facility: CLINIC | Age: 50
End: 2025-03-29
Payer: MEDICAID

## 2025-03-29 VITALS
RESPIRATION RATE: 19 BRPM | WEIGHT: 293 LBS | OXYGEN SATURATION: 97 % | BODY MASS INDEX: 50.02 KG/M2 | HEART RATE: 80 BPM | TEMPERATURE: 98 F | DIASTOLIC BLOOD PRESSURE: 82 MMHG | HEIGHT: 64 IN | SYSTOLIC BLOOD PRESSURE: 118 MMHG

## 2025-03-29 DIAGNOSIS — B96.89 BACTERIAL SINUSITIS: Primary | ICD-10-CM

## 2025-03-29 DIAGNOSIS — J32.9 BACTERIAL SINUSITIS: Primary | ICD-10-CM

## 2025-03-29 PROCEDURE — 99214 OFFICE O/P EST MOD 30 MIN: CPT | Mod: S$GLB,,, | Performed by: NURSE PRACTITIONER

## 2025-03-29 RX ORDER — AMOXICILLIN AND CLAVULANATE POTASSIUM 875; 125 MG/1; MG/1
1 TABLET, FILM COATED ORAL EVERY 12 HOURS
Qty: 14 TABLET | Refills: 0 | Status: SHIPPED | OUTPATIENT
Start: 2025-03-29 | End: 2025-04-05

## 2025-03-29 RX ORDER — PROMETHAZINE HYDROCHLORIDE AND DEXTROMETHORPHAN HYDROBROMIDE 6.25; 15 MG/5ML; MG/5ML
5 SYRUP ORAL EVERY 8 HOURS PRN
Qty: 118 ML | Refills: 0 | Status: SHIPPED | OUTPATIENT
Start: 2025-03-29 | End: 2025-04-08

## 2025-03-29 NOTE — PROGRESS NOTES
"Subjective:      Patient ID: Ashanti Thompson is a 49 y.o. female.    Vitals:  height is 5' 4" (1.626 m) and weight is 149.7 kg (330 lb 0.5 oz) (abnormal). Her oral temperature is 98 °F (36.7 °C). Her blood pressure is 118/82 and her pulse is 80. Her respiration is 19 and oxygen saturation is 97%.     Chief Complaint: Nasal Congestion    Pt is here for congestion which started last Sunday. Pt took claritin and nasal spray, pt was seem Tuesday.     Provider note begins here:  Patient is a 49 year old female here with complaints of sinus congestion, cough, bilateral ear pressure, and headache x6 days. She was seen here on 3/25 and was negative for flu and covid. She was instructed to use flonase and claritin which she has been doing. She feels like the congestion is worst.     Other  This is a new problem. The current episode started 1 to 4 weeks ago. The problem occurs constantly. The problem has been waxing and waning. Associated symptoms include chills, congestion, coughing and headaches. Pertinent negatives include no abdominal pain, anorexia, arthralgias, change in bowel habit, chest pain, diaphoresis, fatigue, fever, joint swelling, myalgias, nausea, neck pain, numbness, rash, sore throat, swollen glands, urinary symptoms, vertigo, visual change, vomiting or weakness. Associated symptoms comments: Sweats at night. Nothing aggravates the symptoms. Treatments tried: claritin and nasal spray. The treatment provided no relief.       Constitution: Positive for chills. Negative for sweating, fatigue and fever.   HENT:  Positive for congestion. Negative for sore throat.    Neck: Negative for neck pain.   Cardiovascular:  Negative for chest pain.   Respiratory:  Positive for cough.    Gastrointestinal:  Negative for abdominal pain, nausea and vomiting.   Musculoskeletal:  Negative for joint pain, joint swelling and muscle ache.   Skin:  Negative for rash.   Neurological:  Positive for headaches. Negative for history of " vertigo and numbness.      Objective:     Physical Exam   Constitutional: She is oriented to person, place, and time. She appears well-developed. She is cooperative.  Non-toxic appearance. She does not appear ill. No distress.   HENT:   Head: Normocephalic and atraumatic.   Ears:   Right Ear: Hearing, external ear and ear canal normal. A middle ear effusion is present.   Left Ear: Hearing, external ear and ear canal normal. A middle ear effusion is present.   Nose: Congestion present. No mucosal edema, rhinorrhea or nasal deformity. No epistaxis. Right sinus exhibits maxillary sinus tenderness and frontal sinus tenderness. Left sinus exhibits maxillary sinus tenderness and frontal sinus tenderness.   Mouth/Throat: Uvula is midline, oropharynx is clear and moist and mucous membranes are normal. No trismus in the jaw. Normal dentition. No uvula swelling. No oropharyngeal exudate, posterior oropharyngeal edema or posterior oropharyngeal erythema.   Eyes: Conjunctivae and lids are normal. No scleral icterus.   Neck: Trachea normal and phonation normal. Neck supple. No edema present. No erythema present. No neck rigidity present.   Cardiovascular: Normal rate, regular rhythm, normal heart sounds and normal pulses.   Pulmonary/Chest: Effort normal and breath sounds normal. No stridor. No respiratory distress. She has no decreased breath sounds. She has no wheezes. She has no rhonchi. She has no rales. She exhibits no tenderness.   Abdominal: Normal appearance.   Musculoskeletal: Normal range of motion.         General: No deformity. Normal range of motion.   Lymphadenopathy:     She has no cervical adenopathy.   Neurological: She is alert and oriented to person, place, and time. She exhibits normal muscle tone. Coordination normal.   Skin: Skin is warm, dry, intact, not diaphoretic and not pale.   Psychiatric: Her speech is normal and behavior is normal. Judgment and thought content normal.   Nursing note and vitals  reviewed.      Assessment:     1. Bacterial sinusitis        Plan:   Will treat for bacterial sinusitis given duration and worsening of symptoms despite flonase and claritin.    Bacterial sinusitis  -     amoxicillin-clavulanate 875-125mg (AUGMENTIN) 875-125 mg per tablet; Take 1 tablet by mouth every 12 (twelve) hours. for 7 days  Dispense: 14 tablet; Refill: 0  -     promethazine-dextromethorphan (PROMETHAZINE-DM) 6.25-15 mg/5 mL Syrp; Take 5 mLs by mouth every 8 (eight) hours as needed (cough).  Dispense: 118 mL; Refill: 0      Patient Instructions   Prescriptions sent to pharmacy:  Augmentin- antibiotic- take twice a day for 7 days. Take with food. May take a probiotic to help with GI side effects.    Promethazine DM- cough suppressant- take at night time as needed for cough. May cause drowsiness    Other recommendations:  Oral antihistamine (Claritin, Zyrtec, Allegra,or Xyzal) for post nasal drip and runny nose  Steroid nasal spray (Flonase) for runny nose and sinus congestion  Cough expectorant (Mucinex DM) to break up mucus in your chest. Take with a full glass of water        Please drink plenty of fluids.  Please get plenty of rest.  Nasal irrigation with a saline spray or Netti Pot like device per their directions is also recommended.  If you  smoke, please stop smoking.    If not allergic, take Tylenol (Acetaminophen) 650 mg to  1 g every 6 hours as needed  and/or Motrin (Ibuprofen) 600 to 800 mg every 6 hours as needed for fever or pain.    Return to clinic if symptoms persist for over 1 week.    Please remember that you have received care at an urgent care today. Urgent cares are not emergency rooms and are not equipped to handle life threatening emergencies and cannot rule in or out certain medical conditions and you may be released before all of your medical problems are known or treated.     Please arrange follow up with your primary care physician or speciality clinic within 2-5 days if your signs  and symptoms have not resolved or worsen.     Patient can call our Referral Hotline at [phone] to make an appointment.      Please return here or go to the Emergency Department for any concerns or worsening of condition.  Signs of infection. These include a fever of 100.4°F (38°C) or higher, chills, cough, more sputum or change in color of sputum.  You are having so much trouble breathing that you can only say one or two words at a time.  You need to sit upright at all times to be able to breathe and or cannot lie down.  You have trouble breathing when talking or sitting still.  You have chest pain when you cough, have trouble breathing but can still talk in full sentences, or cough up blood.

## 2025-03-29 NOTE — PATIENT INSTRUCTIONS
Prescriptions sent to pharmacy:  Augmentin- antibiotic- take twice a day for 7 days. Take with food. May take a probiotic to help with GI side effects.    Promethazine DM- cough suppressant- take at night time as needed for cough. May cause drowsiness    Other recommendations:  Oral antihistamine (Claritin, Zyrtec, Allegra,or Xyzal) for post nasal drip and runny nose  Steroid nasal spray (Flonase) for runny nose and sinus congestion  Cough expectorant (Mucinex DM) to break up mucus in your chest. Take with a full glass of water        Please drink plenty of fluids.  Please get plenty of rest.  Nasal irrigation with a saline spray or Netti Pot like device per their directions is also recommended.  If you  smoke, please stop smoking.    If not allergic, take Tylenol (Acetaminophen) 650 mg to  1 g every 6 hours as needed  and/or Motrin (Ibuprofen) 600 to 800 mg every 6 hours as needed for fever or pain.    Return to clinic if symptoms persist for over 1 week.    Please remember that you have received care at an urgent care today. Urgent cares are not emergency rooms and are not equipped to handle life threatening emergencies and cannot rule in or out certain medical conditions and you may be released before all of your medical problems are known or treated.     Please arrange follow up with your primary care physician or speciality clinic within 2-5 days if your signs and symptoms have not resolved or worsen.     Patient can call our Referral Hotline at [phone] to make an appointment.      Please return here or go to the Emergency Department for any concerns or worsening of condition.  Signs of infection. These include a fever of 100.4°F (38°C) or higher, chills, cough, more sputum or change in color of sputum.  You are having so much trouble breathing that you can only say one or two words at a time.  You need to sit upright at all times to be able to breathe and or cannot lie down.  You have trouble breathing  when talking or sitting still.  You have chest pain when you cough, have trouble breathing but can still talk in full sentences, or cough up blood.

## 2025-03-29 NOTE — LETTER
March 29, 2025      [facility]  [address], [address]  [place] [address]  Phone: [phone]  Fax: [phone]       Patient: Ashanti Thompson   YOB: 1975  Date of Visit: 03/29/2025    To Whom It May Concern:    Reyna Thompson  was at [facility] on 03/29/2025. The patient may return to work/school on 3/31/2025 with no restrictions. If you have any questions or concerns, or if I can be of further assistance, please do not hesitate to contact me.    Sincerely,      Brynn Darnell, NP

## (undated) DEVICE — TOWEL OR DISP STRL BLUE 4/PK

## (undated) DEVICE — ELECTRODE REM PLYHSV RETURN 9

## (undated) DEVICE — DRAIN PENROSE STD 18X1/2IN

## (undated) DEVICE — PAD ABD 8X10 STERILE

## (undated) DEVICE — SEE MEDLINE ITEM 157110

## (undated) DEVICE — GLOVE BIOGEL 7.0

## (undated) DEVICE — COVER OVERHEAD SURG LT BLUE

## (undated) DEVICE — BLANKET UPPER BODY 78.7X29.9IN

## (undated) DEVICE — SOL IRR SOD CHL .9% POUR

## (undated) DEVICE — PACK ENDOSCOPY GENERAL

## (undated) DEVICE — SUPPORT ULNA NERVE PROTECTOR

## (undated) DEVICE — Device

## (undated) DEVICE — GAUZE SPONGE 4X4 12PLY